# Patient Record
Sex: FEMALE | Race: WHITE | NOT HISPANIC OR LATINO | Employment: FULL TIME
[De-identification: names, ages, dates, MRNs, and addresses within clinical notes are randomized per-mention and may not be internally consistent; named-entity substitution may affect disease eponyms.]

---

## 2017-10-01 ENCOUNTER — HEALTH MAINTENANCE LETTER (OUTPATIENT)
Age: 32
End: 2017-10-01

## 2019-01-29 ENCOUNTER — TELEPHONE (OUTPATIENT)
Dept: ONCOLOGY | Facility: CLINIC | Age: 34
End: 2019-01-29

## 2019-01-29 NOTE — TELEPHONE ENCOUNTER
Attempted to reach out to patient by calling number listed in chart.  Automated message stated that subscriber is not accepting calls at this time.  Unable to leave voice message.  Will attempt to contact patient at later date.

## 2019-02-01 ENCOUNTER — TELEPHONE (OUTPATIENT)
Dept: ONCOLOGY | Facility: CLINIC | Age: 34
End: 2019-02-01

## 2019-02-01 NOTE — TELEPHONE ENCOUNTER
Per IB Task: Please arrange non-urgent new patient consult (can either be at [facility] or [facility]-patient was on Medicine service at [facility], not seen by Gyn or Gyn Onc yet). Patient expecting call.     Ascites, bilateral ovarian masses.  Cytology negative.

## 2020-10-21 ENCOUNTER — CARE COORDINATION (OUTPATIENT)
Dept: CARDIOLOGY | Facility: CLINIC | Age: 35
End: 2020-10-21

## 2020-10-21 NOTE — PROGRESS NOTES
Referral- Heart Failure    REFERRING CLINIC INFORMATION:    Referring Clinic: [facility]   Referring Provider: Patria Iyer MD  Contact Information:     REFERRING PATIENT INFORMATION:    Patient Phone Number:   Home Phone [phone]   Mobile [phone]      Consent to Communicate: None Found  Insurance Obtained: Yes       PATIENT/REFERRAL Hx:       October 21, 2020  Referral faxed, Not sure which provider referred. Attempted to call unable to leave . Patient is referred to Dr. Parikh.Katheryn is reviewing.      Sending to [facility] Nurse to Triage    IMAGING REQUESTED        Echo disks were mailed.     PLAN:       Made appt 12/10 with Dr. Taylor. Echos in epic.     ATTEMPTS TO CONTACT:  1. October 12 , 2020 NA unable to leave .    Suhail Mcgarry CMA  Heart Failure, Advanced Heart Failure & CORE  Referral Specialist &     [facility]  Cardiology  Office: [phone]  [address] [place] [address]

## 2020-10-28 PROBLEM — F33.41 RECURRENT MAJOR DEPRESSIVE DISORDER IN PARTIAL REMISSION (H): Status: ACTIVE | Noted: 2019-07-01

## 2020-10-28 PROBLEM — Z86.2 HISTORY OF ITP: Status: ACTIVE | Noted: 2019-03-01

## 2020-10-28 PROBLEM — F41.1 GAD (GENERALIZED ANXIETY DISORDER): Status: ACTIVE | Noted: 2017-02-27

## 2020-10-28 PROBLEM — R18.8 OTHER ASCITES: Status: ACTIVE | Noted: 2019-01-26

## 2020-10-28 PROBLEM — I50.32 CHRONIC DIASTOLIC CONGESTIVE HEART FAILURE (H): Status: ACTIVE | Noted: 2019-11-18

## 2020-10-28 PROBLEM — Z01.84 LACK OF IMMUNITY TO HEPATITIS B VIRUS DEMONSTRATED BY SEROLOGIC TEST: Status: ACTIVE | Noted: 2020-10-21

## 2020-10-28 PROBLEM — I31.9 PERICARDITIS: Status: ACTIVE | Noted: 2019-03-13

## 2020-10-28 RX ORDER — HYDROXYCHLOROQUINE SULFATE 200 MG/1
200 TABLET, FILM COATED ORAL EVERY MORNING
COMMUNITY
Start: 2015-04-07

## 2020-10-28 RX ORDER — OMEPRAZOLE 40 MG/1
40 CAPSULE, DELAYED RELEASE ORAL DAILY
COMMUNITY
Start: 2020-09-01 | End: 2021-03-02

## 2020-10-28 RX ORDER — SPIRONOLACTONE 25 MG/1
12.5 TABLET ORAL EVERY MORNING
Status: ON HOLD | COMMUNITY
Start: 2020-01-20 | End: 2021-03-23

## 2020-10-28 RX ORDER — ACETAMINOPHEN 325 MG/1
325 TABLET ORAL EVERY 6 HOURS PRN
Status: ON HOLD | COMMUNITY
End: 2021-03-23

## 2020-10-28 RX ORDER — TORSEMIDE 10 MG/1
20 TABLET ORAL EVERY MORNING
Status: ON HOLD | COMMUNITY
Start: 2020-04-07 | End: 2021-03-23

## 2020-10-28 RX ORDER — SULFACETAMIDE SODIUM, SULFUR 100; 50 MG/G; MG/G
LOTION TOPICAL PRN
COMMUNITY
Start: 2020-07-08

## 2020-10-28 RX ORDER — SERTRALINE HYDROCHLORIDE 100 MG/1
100 TABLET, FILM COATED ORAL EVERY MORNING
COMMUNITY
Start: 2020-07-22 | End: 2023-06-08

## 2020-10-28 RX ORDER — DIPHENOXYLATE HYDROCHLORIDE AND ATROPINE SULFATE 2.5; .025 MG/1; MG/1
1 TABLET ORAL DAILY
COMMUNITY
End: 2021-03-02

## 2020-10-28 NOTE — PROGRESS NOTES
"Patient is referred by Dr. Patria Iyer, [facility], to Dr. 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 In early 2019, she had acute pericarditis thought to be secondary to her lupus. She was on prednisone and colchicine for about 5 months. When her inflammatory markers were rechecked, they were normal. Her cardiac imaging thus far shows no evidence for constrictive pericarditis. On her recent echocardiogram, there was no evidence for a pericardial effusion. Her cardiac MRI did not show increased pericardial thickening or ventricular interdependence. She did have an ovarian mass and had that removed in December of 2019. The patient had problems with pleural and pericardial effusions. She also had ascites. When she had her last paracentesis in August of 2019, the fluid looked to be transudative. Her liver tests and kidney function are normal. She continues to have difficulties with recurrent abdominal fluid accumulation. She did not tolerate higher doses of Aldactone. The patient will continue on her current dose of torsemide and Aldactone for now. She is thinking of getting another opinion at the [facility]. I encouraged her to do this. They may want to repeat invasive testing to again assess for the possibility of constrictive pericarditis. The patient does not endorse any further pleuritic chest pain to suggest an recurrent pericarditis. On the echocardiogram, there was no evidence for pericardial effusion at this time. There was some speculation that she could have had Meigs syndrome; however even when her ovarian mass was removed, she still has difficulties with fluid retention. As mentioned above, her paracentesis fluid did not appear exudative, but rather was transudative in nature.\"      10/28/20 - Called and left message for patient to call back if she wished to proceed " making an appt appt with Dr. Parikh.    11/2/20 - Left message for patient to call to schedule appt per referral.  11/4/20 - Patient returned call and will be scheduled for Dec.

## 2020-11-05 ENCOUNTER — CARE COORDINATION (OUTPATIENT)
Dept: CARDIOLOGY | Facility: CLINIC | Age: 35
End: 2020-11-05

## 2020-11-05 NOTE — PROGRESS NOTES
"Patient is referred by Dr. Patria Iyer, On license of [facility], to Dr. 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 In early 2019, she had acute pericarditis thought to be secondary to her lupus. She was on prednisone and colchicine for about 5 months. When her inflammatory markers were rechecked, they were normal. Her cardiac imaging thus far shows no evidence for constrictive pericarditis. On her recent echocardiogram, there was no evidence for a pericardial effusion. Her cardiac MRI did not show increased pericardial thickening or ventricular interdependence. She did have an ovarian mass and had that removed in December of 2019. The patient had problems with pleural and pericardial effusions. She also had ascites. When she had her last paracentesis in August of 2019, the fluid looked to be transudative. Her liver tests and kidney function are normal. She continues to have difficulties with recurrent abdominal fluid accumulation. She did not tolerate higher doses of Aldactone. The patient will continue on her current dose of torsemide and Aldactone for now. She is thinking of getting another opinion at the [facility]. I encouraged her to do this. They may want to repeat invasive testing to again assess for the possibility of constrictive pericarditis. The patient does not endorse any further pleuritic chest pain to suggest an recurrent pericarditis. On the echocardiogram, there was no evidence for pericardial effusion at this time. There was some speculation that she could have had Meigs syndrome; however even when her ovarian mass was removed, she still has difficulties with fluid retention. As mentioned above, her paracentesis fluid did not appear exudative, but rather was transudative in nature.\"     "

## 2020-11-30 DIAGNOSIS — I50.32 CHRONIC DIASTOLIC CONGESTIVE HEART FAILURE (H): Primary | ICD-10-CM

## 2020-12-10 ENCOUNTER — OFFICE VISIT (OUTPATIENT)
Dept: CARDIOLOGY | Facility: CLINIC | Age: 35
End: 2020-12-10
Attending: INTERNAL MEDICINE
Payer: COMMERCIAL

## 2020-12-10 VITALS
WEIGHT: 146 LBS | DIASTOLIC BLOOD PRESSURE: 68 MMHG | HEIGHT: 65 IN | HEART RATE: 79 BPM | OXYGEN SATURATION: 100 % | BODY MASS INDEX: 24.32 KG/M2 | SYSTOLIC BLOOD PRESSURE: 99 MMHG

## 2020-12-10 DIAGNOSIS — I50.32 CHRONIC DIASTOLIC CONGESTIVE HEART FAILURE (H): ICD-10-CM

## 2020-12-10 DIAGNOSIS — I42.5 RESTRICTIVE CARDIOMYOPATHY (H): Primary | ICD-10-CM

## 2020-12-10 LAB
ANION GAP SERPL CALCULATED.3IONS-SCNC: 5 MMOL/L (ref 3–14)
BUN SERPL-MCNC: 11 MG/DL (ref 7–30)
CALCIUM SERPL-MCNC: 8.5 MG/DL (ref 8.5–10.1)
CHLORIDE SERPL-SCNC: 105 MMOL/L (ref 94–109)
CO2 SERPL-SCNC: 29 MMOL/L (ref 20–32)
CREAT SERPL-MCNC: 0.72 MG/DL (ref 0.52–1.04)
GFR SERPL CREATININE-BSD FRML MDRD: >90 ML/MIN/{1.73_M2}
GLUCOSE SERPL-MCNC: 86 MG/DL (ref 70–99)
NT-PROBNP SERPL-MCNC: 326 PG/ML (ref 0–125)
POTASSIUM SERPL-SCNC: 4 MMOL/L (ref 3.4–5.3)
SODIUM SERPL-SCNC: 140 MMOL/L (ref 133–144)

## 2020-12-10 PROCEDURE — 80048 BASIC METABOLIC PNL TOTAL CA: CPT | Performed by: PATHOLOGY

## 2020-12-10 PROCEDURE — 99205 OFFICE O/P NEW HI 60 MIN: CPT | Performed by: INTERNAL MEDICINE

## 2020-12-10 PROCEDURE — G0463 HOSPITAL OUTPT CLINIC VISIT: HCPCS

## 2020-12-10 PROCEDURE — 36415 COLL VENOUS BLD VENIPUNCTURE: CPT | Performed by: PATHOLOGY

## 2020-12-10 PROCEDURE — 83880 ASSAY OF NATRIURETIC PEPTIDE: CPT | Performed by: PATHOLOGY

## 2020-12-10 RX ORDER — LIDOCAINE 40 MG/G
CREAM TOPICAL
Status: CANCELLED | OUTPATIENT
Start: 2020-12-10

## 2020-12-10 RX ORDER — SODIUM CHLORIDE 9 MG/ML
INJECTION, SOLUTION INTRAVENOUS CONTINUOUS
Status: CANCELLED | OUTPATIENT
Start: 2020-12-10

## 2020-12-10 ASSESSMENT — PAIN SCALES - GENERAL: PAINLEVEL: NO PAIN (0)

## 2020-12-10 ASSESSMENT — MIFFLIN-ST. JEOR: SCORE: 1358.13

## 2020-12-10 NOTE — LETTER
"12/10/2020      RE: Sissy Donaldson  [id number]  [place] [address]       Dear Colleague,    Thank you for the opportunity to participate in the care of your patient, Sissy Donaldson, at the [facility] at [facility]. Please see a copy of my visit note below.    Patient is referred by Dr. Patria Iyer, [facility], to Dr. 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 In early 2019, she had acute pericarditis thought to be secondary to her lupus. She was on prednisone and colchicine for about 5 months. When her inflammatory markers were rechecked, they were normal. Her cardiac imaging thus far shows no evidence for constrictive pericarditis. On her recent echocardiogram, there was no evidence for a pericardial effusion. Her cardiac MRI did not show increased pericardial thickening or ventricular interdependence. She did have an ovarian mass and had that removed in December of 2019. The patient had problems with pleural and pericardial effusions. She also had ascites. When she had her last paracentesis in August of 2019, the fluid looked to be transudative. Her liver tests and kidney function are normal. She continues to have difficulties with recurrent abdominal fluid accumulation. She did not tolerate higher doses of Aldactone. The patient will continue on her current dose of torsemide and Aldactone for now. She is thinking of getting another opinion at the [facility]. I encouraged her to do this. They may want to repeat invasive testing to again assess for the possibility of constrictive pericarditis. The patient does not endorse any further pleuritic chest pain to suggest an recurrent pericarditis. On the echocardiogram, there was no evidence for pericardial effusion at this time. There was some " "speculation that she could have had Meigs syndrome; however even when her ovarian mass was removed, she still has difficulties with fluid retention. As mentioned above, her paracentesis fluid did not appear exudative, but rather was transudative in nature.\"        SUBJECTIVE: Follow-up for lupus: pericarditis on cardiac MRI, background history of lupus: Thrombocytopenia (resolved), inflammatory arthritis, positive DELFINA, positive SSA, positive anti-native DNA antibody (transient), positive lupus anticoagulant with no prior history of thrombotic event or pregnancy complication, transient elevation of IgA and IgM anti-beta-2 glycoprotein 1 antibodies, hypocomplementemia C3. She had a flare earlier 2018 when she was off hydroxychloroquine. Her thrombocytopenia has resolved since she was put back on hydroxychloroquine and with a course of prednisone. Recently found ascites (Aa-Sa gradient less than 1.1), ovarian cyst thought to be benign, recurrent ascites with repeated evaluation suggesting Aa-Sa gradient more than 1.1.     History of present illness: This is a 5 month follow-up for the patient. She currently takes hydroxychloroquine 400 mg a day. For her diastolic dysfunction, she currently takes combination of torsemide 20-30 mg a day, and spironolactone 12.5 mg a day. She underwent ovary and cyst removal earlier this year. The pathology was benign.    She has continued to do well. She has no more lower extremity swelling. They abdominal bloating has significantly improved. She reported no unusual fever, skin rashes, PND/orthopnea, oral/nasal ulcer, hair loss, swollen joint, paresthesia or muscle weakness. She is normotensive.    She is due to have an ocular monitoring. She is also interested in having an influenza vaccination today.     Past medical history, family and social history, current medications and adverse reactions were updated in EMR.    Physical exams: /64 (BP Location: Left Arm, BP Cuff Size: " Regular)  Pulse 80  Temp 97.3  F (36.3  C) (Temporal Artery)     General appearance: A young looking middle-aged  female who was not in acute physical distress.  Skin: No rash, tophus, nodules, open-wound ulcers, Raynaud changes, telangiectasia, sclerodermatous and dermatomyositis skin changes, psoriasis, psoriatic nail or anything suggesting vasculitis, erythema nodosum. Normal nailfold capillaries. No extremity edema.  HEENT: No psoriasis on the scalp. No conjunctivitis, scleritis or active uveitis or synaechia. Normal extra ocular movements. No sinus tenderness. No malar rash, discoid rash, oral or nasal mucosal ulcers. No nasal septal perforation.  Respiratory: Normal respiratory effort. Lungs are clear with good breath sounds.  Heart: RR without audible murmurs, rubs, or gallops.  Musculoskeletal exams: All 4 extremities were examined. No significant synovitis or active inflammatory arthritis involving any joints of 4 extremities. Normal gait, normal muscle power and tone.    Assessment and plan:     1. Systemic lupus, doing clinically well.  2. Chronic long-term high-risk medication and monitoring-hydroxychloroquine.    She is doing very well. There is nothing clinically to suggest clinical disease activity of lupus. She just had a blood and urine test this morning. The preliminary CBC is completely normal and her urinalysis is also completely normal.    She is due to have an ocular monitoring for hydroxychloroquine and this will be arranged for her. Since her disease has been doing well, I will cut down the dose of hydroxychloroquine to comply with 5 milligram/kilogram by asking her to take 400 mg during the week days and 200 mg during the weekends. Sunscreen use when outdoor.    Influenza vaccination was recommended and given to her today.    Followup again in 6 months or earlier if she is not doing well.         [facility] First Time consultation Notes        HPI:     Dear Colleagues,     I had the pleasure of seeing Sissy Donaldson in the  [facility] on December 10, 2020.     As you know the patient is a very pleasant 35 year old female with a history of long standing systolic heart failure who presents today for consideration of advanced therapies.     [unfilled] cardiac history is as follows.     Recently, the patient reports slowing down, being less active, endorses PND/orthopnea     [unfilled] has had x  lsb of unintentional weight loss.       PAST MEDICAL HISTORY:  Past Medical History:   Diagnosis Date     SLE (systemic lupus erythematosus related syndrome) (H)         FAMILY HISTORY:      SOCIAL HISTORY:          CURRENT MEDICATIONS:    Current Outpatient Medications   Medication Sig Dispense Refill     acetaminophen (TYLENOL) 325 MG tablet Take 325 mg by mouth every 6 hours as needed       cholecalciferol 50 MCG (2000 UT) tablet Take 2,000 Units by mouth daily       citalopram (CELEXA) 20 MG tablet 10 mg daily  3     hydroxychloroquine (PLAQUENIL) 200 MG tablet 2 tab/d Monday-Friday, 1 tab/d on Saturday and Sunday.       metroNIDAZOLE (METROCREAM) 0.75 % external cream Use on the face BID       Multiple Vitamin (MULTI-VITAMINS) TABS Take 1 tablet by mouth daily       omeprazole (PRILOSEC) 40 MG DR capsule Take 40 mg by mouth daily       sertraline (ZOLOFT) 100 MG tablet Take 100 mg by mouth daily       spironolactone (ALDACTONE) 25 MG tablet Take 12.5 mg by mouth daily       sulfacetamide sodium-sulfur 10-5 % LOTN Wash face daily.       torsemide (DEMADEX) 10 MG tablet Take 20 mg by mouth daily         ROS:   Constitutional: No fever, chills, or sweats. Weight . + fatigue   ENT: No visual disturbance, ear ache, epistaxis, sore throat.   Allergies/Immunologic: Negative.   Respiratory: No cough, hemoptysis.   Cardiovascular: As per HPI.   GI: No nausea, vomiting, hematemesis, melena, or hematochezia.   :  "No urinary frequency, dysuria, or hematuria.   Integument: Negative.   Psychiatric: Negative.   Neuro: Negative.   Endocrinology: Negative.   Musculoskeletal: Negative.      Family history:     Mother side   Cousin RA   Aunt Tess,   Santi aunt lupus         EXAM:    BP 99/68 (BP Location: Right arm, Patient Position: Chair, Cuff Size: Adult Regular)   Pulse 79   Ht 1.651 m (5' 5\")   Wt 66.2 kg (146 lb)   SpO2 100%   BMI 24.30 kg/m      General: appears comfortable, alert and articulate  Head: normocephalic, atraumatic  Eyes: anicteric sclera, EOMI  Neck: no adenopathy  Orophyarynx: moist mucosa, no lesions, dentition intact  Heart: PMI diffuse,trace systolic murmur at LLSB, regular, S1/S2, rub, estimated JVP 9 cm   Lungs: clear, no rales or wheezing  Abdomen: soft, non-tender, bowel sounds present, no hepatosplenomegaly  Extremities: no clubbing, cyanosis or edema  Neurological: normal speech and affect, no gross motor deficits    Labs:      ECHOCARDIOGRAM.  Date: 09/14/2020 Start: 01:05 PM [facility]    CONCLUSIONS  1. Left ventricular chamber size is normal. Normal left ventricular  wall thickness. Global and regional left ventricular function is  normal. Left ventricular ejection fraction is visually estimated at  60%. Normal left ventricular diastolic function.  2. Normal right ventricle size and normal global function.  3. The cardiac valves are normal.  4. No pulmonary hypertension.  5. Dilation of the inferior vena cava (> 2.1 cm) with abnormal  respiratory variation in diameter. Estimated right atrial pressure is  > 15 mmHg.  6. There is no pericardial effusion.  7. Compared to the previous study done on 5/14/20, the central venous  pressure has increased.      FINDINGS    MITRAL VALVE  Normal mitral valve structure and function. Trace (physiologic) mitral  regurgitation.    AORTIC VALVE  The aortic valve is tricuspid. Normal aortic valve structure " and  function.    TRICUSPID VALVE  Normal tricuspid valve structure, but trace tricuspid regurgitation.  Pulmonary artery systolic pressure estimate is 14 mmHg above RAP.    PULMONIC VALVE  Normal pulmonic valve structure and function. Mild (physiologic)  pulmonic regurgitation.    LEFT ATRIUM  Normal left atrium.    LEFT VENTRICLE  Left ventricular chamber size is normal. Normal left ventricular wall  thickness. Global and regional left ventricular function is normal.  Left ventricular ejection fraction is visually estimated at 60%.  Normal left ventricular diastolic function.    RIGHT ATRIUM  Normal right atrium.    RIGHT VENTRICLE  Normal right ventricle size and normal global function.    PERICARDIAL EFFUSION  There is no pericardial effusion.      MISCELLANEOUS  The visualized segments of the thoracic aorta are normal in diameter.  Dilation of the inferior vena cava (> 2.1 cm) with abnormal  respiratory variation in diameter. Estimated right atrial pressure is  > 15 mmHg.    M-MODE/2D MEASUREMENTS & CALCULATIONS   LV Diastolic Dimension: 4.52 cm   LV PW Diastolic: 0.58 cm   Septum Diastolic: 0.52 cm                                              LA Dimension: 3.6 cm                                              Aortic Annulus: 2 cm                                              LA Area: 16.4 cm^2   LV Systolic Dimension: 2.92 cm             LA/Aorta: 1.8   LV Volume Diastolic: 92.3 ml               Ascending Aorta: 2.6 cm   LV Volume Systolic: 24.9 ml                LA volume index: 30.8   LV EDV/LV EDV Index: 92.3 ml/54 m^2        ml/m^2   LV ESV/LV ESV Index: 24.9 ml/15 m^2EF   Estimated: 60 %                            IVC Inspiration: 2.4 cm                                              IVC Expiration: 2.86 cm   LVOT: 2.1 cm    DOPPLER MEASUREMENTS & CALCULATIONS   MV Peak E-Wave: 0.6 m/s   MV Peak A-Wave: 0.3 m/s       Pulm. Vein D Velocity:0.4 m/s   MV E/A Ratio: 2.18            Pulm. Vein A Reversal  Velocity:0.2 m/s   MV Peak Gradient: 1.36 mmHg   Pulm. Vein S Velocity: 0.5 m/s   MV Deceleration Time: 180   msec   E' Velocity: 0.18 m/s                                 TR Velocity:1.8 m/s                                 TR Gradient:13.5 mmHg    PROCEDURE    Doppler Quality: Adequate quality pulse, continuous wave, and color  Doppler was performed and interpreted.  2-D Quality: Adequate quality 2-dimensional echo was performed and  interpreted.    Indications: Chest pain.    Contrast Medium: Not Applicable.  Height: 65 inches Weight: 140 pounds BSA: 1.7 m^2 BMI: 23.3 kg/m^2  Rhythm: SinusBP: 102/64 mmHg     Gender:                            Female  SIGNATURE  ----------------------------------------------------------------------   Electronically signed by MALIK MCKEON MD(Interpreting   Physician) on 09/16/2020 10:58 AM  ----------------------------------------------------------------------  DEMOGRAPHICS   Patient Name   [facility] Number        OUTPT                  JOVAN ELAM   Patient Number [id number]        Date of Study      09/14/2020   Accession      [id number]       Interpreting       MALIK MCKEON,   Zandra                         Physician          MD   Date of Birth  1985      Ordering Physician VAUGHN SERNA MD   Primary        VAUGHN SERNA MD Sonographer        , [facility]   Physician  The procedure was explained in detail to the patient. Risks,  complications and alternative treatments were reviewed. Informed  consent was obtained.   Other Result Information   Daniel, Card Results In - 09/16/2020 10:58 AM CDT   ECHOCARDIOGRAM.  Date: 09/14/2020 Start: 01:05 PM [facility]    CONCLUSIONS  1. Left ventricular chamber size is normal. Normal left ventricular  wall thickness. Global and regional left ventricular function is  normal. Left ventricular ejection fraction is visually estimated at  60%. Normal left ventricular diastolic function.  2. Normal right  ventricle size and normal global function.  3. The cardiac valves are normal.  4. No pulmonary hypertension.  5. Dilation of the inferior vena cava (> 2.1 cm) with abnormal  respiratory variation in diameter. Estimated right atrial pressure is  > 15 mmHg.  6. There is no pericardial effusion.  7. Compared to the previous study done on 5/14/20, the central venous  pressure has increased.      FINDINGS    MITRAL VALVE  Normal mitral valve structure and function. Trace (physiologic) mitral  regurgitation.    AORTIC VALVE  The aortic valve is tricuspid. Normal aortic valve structure and  function.    TRICUSPID VALVE  Normal tricuspid valve structure, but trace tricuspid regurgitation.  Pulmonary artery systolic pressure estimate is 14 mmHg above RAP.    PULMONIC VALVE  Normal pulmonic valve structure and function. Mild (physiologic)  pulmonic regurgitation.    LEFT ATRIUM  Normal left atrium.    LEFT VENTRICLE  Left ventricular chamber size is normal. Normal left ventricular wall  thickness. Global and regional left ventricular function is normal.  Left ventricular ejection fraction is visually estimated at 60%.  Normal left ventricular diastolic function.    RIGHT ATRIUM  Normal right atrium.    RIGHT VENTRICLE  Normal right ventricle size and normal global function.    PERICARDIAL EFFUSION  There is no pericardial effusion.      MISCELLANEOUS  The visualized segments of the thoracic aorta are normal in diameter.  Dilation of the inferior vena cava (> 2.1 cm) with abnormal  respiratory variation in diameter. Estimated right atrial pressure is  > 15 mmHg.    M-MODE/2D MEASUREMENTS & CALCULATIONS   LV Diastolic Dimension: 4.52 cm   LV PW Diastolic: 0.58 cm   Septum Diastolic: 0.52 cm                                              LA Dimension: 3.6 cm                                              Aortic Annulus: 2 cm                                              LA Area: 16.4 cm^2   LV Systolic Dimension: 2.92 cm              LA/Aorta: 1.8   LV Volume Diastolic: 92.3 ml               Ascending Aorta: 2.6 cm   LV Volume Systolic: 24.9 ml                LA volume index: 30.8   LV EDV/LV EDV Index: 92.3 ml/54 m^2        ml/m^2   LV ESV/LV ESV Index: 24.9 ml/15 m^2EF   Estimated: 60 %                            IVC Inspiration: 2.4 cm                                              IVC Expiration: 2.86 cm   LVOT: 2.1 cm    DOPPLER MEASUREMENTS & CALCULATIONS   MV Peak E-Wave: 0.6 m/s   MV Peak A-Wave: 0.3 m/s       Pulm. Vein D Velocity:0.4 m/s   MV E/A Ratio: 2.18            Pulm. Vein A Reversal Velocity:0.2 m/s   MV Peak Gradient: 1.36 mmHg   Pulm. Vein S Velocity: 0.5 m/s   MV Deceleration Time: 180   msec   E' Velocity: 0.18 m/s                                 TR Velocity:1.8 m/s                                 TR Gradient:13.5 mmHg    PROCEDURE    Doppler Quality: Adequate quality pulse, continuous wave, and color  Doppler was performed and interpreted.  2-D Quality: Adequate quality 2-dimensional echo was performed and  interpreted.    Indications: Chest pain.    Contrast Medium: Not Applicable.  Height: 65 inches Weight: 140 pounds BSA: 1.7 m^2 BMI: 23.3 kg/m^2  Rhythm: SinusBP: 102/64 mmHg     Gender:                            Female  SIGNATURE  ----------------------------------------------------------------------   Electronically signed by MALIK MCKEON MD(Interpreting   Physician) on 09/16/2020 10:58 AM  ----------------------------------------------------------------------  DEMOGRAPHICS   Patient Name   TAMIR       Room Number        MIGUEL ELAM   Patient Number [id number]        Date of Study      09/14/2020   Accession      [id number]       Interpreting       MALIK MCKEON,   Zandra                         Physician          MD   Date of Birth  1985      Ordering Physician VAUGHN SERNA MD   Primary        VAUGHN SERNA MD Sonographer        O, [facility]   Physician  The procedure was explained in  detail to the patient. Risks,  complications and alternative treatments were reviewed. Informed  consent was obtained.       Chest CT   CHEST WALL AND LOWER NECK: The visualized thyroid is normal. The chest wall and visualized supraclavicular soft tissues are unremarkable.    AXILLAE: No adenopathy.    MEDIASTINUM AND CHELLE: There is residual thymic tissue in the anterior superior mediastinum. No significant mediastinal or hilar adenopathy. The esophagus is unremarkable.    VESSELS: Unremarkable.    HEART: Unremarkable.    PERICARDIAL EFFUSION: Small pericardial effusion or pericardial thickening is unchanged.     LUNG AND LARGE AIRWAYS: Unremarkable.    PLEURA: No significant pleural effusion.    BONES: Unremarkable.    UPPER ABDOMEN: Parenchymal calcification or nonobstructing stone in the right mid kidney is unchanged. The visualized upper abdominal structures are otherwise unremarkable.    IMPRESSION:   1. Small pericardial effusion or pericardial thickening is unchanged.  2. The lungs are clear.        Has ascites     IMPRESSION: Small volume ascites, possibly loculated in the right upper quadrant. The largest ascites pocket is seen in the right lower quadrant.      Right and left heart cath       Pressures:  Baseline  Pressures:  - HR: 85  Pressures:  - Rhythm:  Pressures:  -- Left Ventricle (s/edp): 98/27/--  Pressures:  -- Pulmonary Artery (S/D/M): 35/21/27  Pressures:  -- Pulmonary Capillary Wedge: --/25/22  Pressures:  -- Right Atrium (a/v/M): --/33/24  Pressures:  -- Right Ventricle (s/edp): 35/22/--    O2 Sats:  Baseline  O2 Sats:  - HR: 85  O2 Sats:  - Rhythm:  O2 Sats:  -- AO: 11.8/98/15.73  O2 Sats:  -- IVC: 11.6/72.2/11.39  O2 Sats:  -- IVC: 12.1/71.9/11.83  O2 Sats:  -- PA: 12.4/70.2/11.84  O2 Sats:  -- PCW: 11.6/99/15.62  O2 Sats:  -- RA: 12.6/72.1/12.36  O2 Sats:  -- RV: 12.1/70.5/11.6  O2 Sats:  -- SVC: 11.8/62.2/9.98    Outputs:  Baseline  Outputs:  -- CALCULATIONS: [age]  Outputs:  -- CALCULATIONS: Body Surface Area: 1.65  Outputs:  -- CALCULATIONS: Height in cm: 165.00  Outputs:  -- CALCULATIONS: Sex: Female  Outputs:  -- CALCULATIONS: Weight in k.00  Outputs:  -- OUTPUTS: Blood Oxygen Difference: 4.34  Outputs:  -- OUTPUTS: CO by Madelin: 3.98  Outputs:  -- OUTPUTS: Madelin cardiac index: 2.42  Outputs:  -- OUTPUTS: Madelin HR: 70.00  Outputs:  -- OUTPUTS: O2 consumption: 172.65  Outputs:  -- OUTPUTS: Vo2 Indexed: 104.84  Outputs:  -- RESISTANCES: Pulmonary vascular index (dsc):  144.17  Outputs:  -- RESISTANCES: Pulmonary vascular index (Wood  Units): 1.80  Outputs:  -- RESISTANCES: Pulmonary vascular resistance  (dsc): 87.55  Outputs:  -- RESISTANCES: Pulmonary vascular resistance  (Wood Units): 1.09  Outputs:  -- RESISTANCES: Right ventricular stroke work:  2.62  Outputs:  -- RESISTANCES: Right ventricular stroke work  index: 1.59  Outputs:  -- RESISTANCES: Total pulmonary index (dsc):  778.54  Outputs:  -- RESISTANCES: Total pulmonary index (Wood  Units): 9.73  Outputs:  -- RESISTANCES: Total pulmonary resistance (dsc):  472.75  Outputs:  -- RESISTANCES: Total pulmonary resistance (Wood  Units): 5.91  Outputs:  -- SHUNTS: Effective pulmonary flow: 4.08  Outputs:  -- SHUNTS: Effective pulmonary flow indexed: 2.48  Outputs:  -- SHUNTS: Left right Flow indexed: 0.29  Outputs:  -- SHUNTS: Left to Right flow: 0.48  Outputs:  -- SHUNTS: Pulmonary flow: 4.57  Outputs:  -- SHUNTS: Pulmonary flow/Systemic flow ratio:  1.15  Outputs:  -- SHUNTS: Qp Indexed: 2.77  Outputs:  -- SHUNTS: Qs Indexed: 2.42  Outputs:  -- SHUNTS: Right left flow indexed: -0.06  Outputs:  -- SHUNTS: Right to left flow: -0.10  Outputs:  -- SHUNTS: Systemic flow: 3.98   Other Result Information   Daniel, Card Results In - 2019 12:53 PM CST [facility]  [address] [place] [address]  [phone]    JOVAN GARNETT   [id number]    Cardiovascular Catheterization  Comprehensive Report    : 1985  Age: 33 years  Gender: Female  Study date: 2019  Test time: 10:30 - 11:15  Fluoro time: 5.26 min    Diagnostic Cardiologist:  JENNY CORNELIUS MD  Circulator:  Tesha Bailey RN  Scrub:  Abiel Metcalf  X-ray Tech:  Anne-Marie Lordah RT  Monitor:  Donavan Alvarez  Ordering Physician:  EJNNY CORNELIUS MD  Circulator:  Mala Gonzalez RN  Scrub:  Lacey Elkins CVT  X-ray Tech:  Jean-Paul Easley RT  Monitor:  Abiel Palacios    Procedures  PROCEDURES PERFORMED:    --  Right heart catheterization.  --  Ultrasound.  --  Left Heart Cath.  NARRATIVE: The risks and alternatives of the procedures and  conscious sedation were explained to the patient and  informed consent was obtained. The patient was brought to  the cath lab and placed on the table. The planned puncture  sites were prepped and draped in the usual sterile fashion.    --  Right radial artery access.    --  Right brachial vein access.    --  Right heart catheterization. A catheter was advanced to  the pulmonary artery. Measurements of cardiac output were  performed.    --  Ultrasound.    --  Left Heart Cath.    PROCEDURE COMPLETION    TIMING: Test started at 10:30. Test concluded at 11:15.  RADIATION EXPOSURE: Fluoroscopy time: 5.26 min.    CONTRAST GIVEN: 0 ml Isovue 300.    MEDICATIONS GIVEN: Fentanyl, 50 mcg, IV, at 10:51. Fentanyl,  25 mcg, IV, at 10:55. Fentanyl, 25 mcg, IV, at 10:57.  Versed (1 mg), 1 mg, IV, at 10:51. Versed (1 mg), 0.5 mg,  IV, at 10:55. Versed (1 mg), 0.5 mg, IV, at 10:57. NTG  (INTRA-ARTERIAL), 100 mcg, at 11:00. Heparin Bolus, 3,000  units, IV, at 11:03. Normal Saline Bolus .9, 200 ml, IV, at  11:07.    Study conclusions  IMPRESSIONS: Severely elevated RA pressures.  Normal RV and PA pressures.  Moderately elevated PCWP and LVEDP, likely secondary to  diastolic dysfunction.  Simultaneous LV-RV pressures did not demonstrate any  evidence of pericardial constriction.    Conclusions: Elevated left  sided filling pressures secondary  to diastolic dysfunction. Recommend low salt diet and Lasix  20 mg Q daily until follow up appointment.    Prepared and signed by    JENNY CORNELIUS MD  Signed 2019 12:53:14    HEMODYNAMIC TABLES    Pressures:  Baseline  Pressures:  - HR: 85  Pressures:  - Rhythm:  Pressures:  -- Left Ventricle (s/edp): 98/27/--  Pressures:  -- Pulmonary Artery (S/D/M): 35//27  Pressures:  -- Pulmonary Capillary Wedge: --//  Pressures:  -- Right Atrium (a/v/M): --//  Pressures:  -- Right Ventricle (s/edp): 35/22/--    O2 Sats:  Baseline  O2 Sats:  - HR: 85  O2 Sats:  - Rhythm:  O2 Sats:  -- AO: 11.8/98/15.73  O2 Sats:  -- IVC: 11.6/72.2/11.39  O2 Sats:  -- IVC: 12.1/71.9/11.83  O2 Sats:  -- PA: 12.4/70.2/11.84  O2 Sats:  -- PCW: 11.6/99/15.62  O2 Sats:  -- RA: 12.6/72.1/12.36  O2 Sats:  -- RV: 12.1/70.5/11.6  O2 Sats:  -- SVC: 11.8/62.2/9.98    Outputs:  Baseline  Outputs:  -- CALCULATIONS[age]  Outputs:  -- CALCULATIONS: Body Surface Area: 1.65  Outputs:  -- CALCULATIONS: Height in cm: 165.00  Outputs:  -- CALCULATIONS: Sex: Female  Outputs:  -- CALCULATIONS: Weight in k.00  Outputs:  -- OUTPUTS: Blood Oxygen Difference: 4.34  Outputs:  -- OUTPUTS: CO by Madelin: 3.98  Outputs:  -- OUTPUTS: Madelin cardiac index: 2.42  Outputs:  -- OUTPUTS: Madelin HR: 70.00  Outputs:  -- OUTPUTS: O2 consumption: 172.65  Outputs:  -- OUTPUTS: Vo2 Indexed: 104.84  Outputs:  -- RESISTANCES: Pulmonary vascular index (dsc):  144.17  Outputs:  -- RESISTANCES: Pulmonary vascular index (Wood  Units): 1.80  Outputs:  -- RESISTANCES: Pulmonary vascular resistance  (dsc): 87.55  Outputs:  -- RESISTANCES: Pulmonary vascular resistance  (Wood Units): 1.09  Outputs:  -- RESISTANCES: Right ventricular stroke work:  2.62  Outputs:  -- RESISTANCES: Right ventricular stroke work  index: 1.59  Outputs:  -- RESISTANCES: Total pulmonary index (dsc):  778.54  Outputs:  -- RESISTANCES: Total pulmonary index  (Wood  Units): 9.73  Outputs:  -- RESISTANCES: Total pulmonary resistance (dsc):  472.75  Outputs:  -- RESISTANCES: Total pulmonary resistance (Wood  Units): 5.91  Outputs:  -- SHUNTS: Effective pulmonary flow: 4.08  Outputs:  -- SHUNTS: Effective pulmonary flow indexed: 2.48  Outputs:  -- SHUNTS: Left right Flow indexed: 0.29  Outputs:  -- SHUNTS: Left to Right flow: 0.48  Outputs:  -- SHUNTS: Pulmonary flow: 4.57  Outputs:  -- SHUNTS: Pulmonary flow/Systemic flow ratio:  1.15  Outputs:  -- SHUNTS: Qp Indexed: 2.77  Outputs:  -- SHUNTS: Qs Indexed: 2.42  Outputs:  -- SHUNTS: Right left flow indexed: -0.06  Outputs:  -- SHUNTS: Right to left flow: -0.10  Outputs:  -- SHUNTS: Systemic flow: 3.98       MRI   1)   Normal left ventricular chamber size  with calculated left ventricular ejection fraction 58%.   2)   Normal right ventricular chamber size and systolic function.   3)   Pericardial thickness appears to be normal. Trivial pericardial effusion. During free breathing, there is no evidence of ventricular interdependence.    4)   Patient was given gadolinium and delayed hyperenhancement study was performed.  There was no myocardial hyperenhancement noted following gadolinium infusion, there by negating the presence of previous myocardial infarction, fibrosis, infiltrative cardiomyopathy and/or scarring.   5)   With gadolinium, there is hyperenhancement of the pericardium suggestive of acute inflammatory pericarditis.          inflammatory markers negative           RF negative   HIV neg    DS DNA: equivocal     nt-bnp: 145   Very low level positiuve         Assessment and Plan:           Service Date: 12/10/2020      [facility]    [address] [place] [address]      MD Stacy P[facility]    [address] [place] [id number]      RE: Sissy Donaldson    MRN: [id number]   : 1985       Dear Colleagues:       I had the pleasure of seeing Sissy Donaldson in the [facility] for a first-time consultation on 12/10/2020.  As you know, she is a very pleasant, 35-year-old woman with a long-standing history of lupus who has had recurrent pericardial effusions and pericarditis, who is referred for a second opinion about whether or not she may have constrictive pericarditis.      Her lupus history begins about 12 years ago when she had arthritis in several joints as well as ITP.  She was placed on several months of steroids and eventually was on hydroxychloroquine with good control.  Five years ago, she had her first pericardial effusion, which was treated with anti-inflammatories.  She has had a recurrence of pericardial effusion in the last 2 years and also had onset of dyspnea on exertion.  She had abdominal bloating and lower extremity edema as well as generalized fatigue.  She has had significant evaluation for this, including right and left heart catheterization as well as a cardiac MRI.  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  This was removed over a year ago without improvement in her abdominal edema.  She also had several different paracenteses that had demonstrated transudative fluid.  Of note, her filling pressures on her right heart catheterization were significantly elevated.  On her cardiac MRI, she had no fibrosis or evidence of infiltrative disease.      She recently saw her rheumatologist, who felt as though her lupus was under control.  While during this last episode of pericarditis she had evidence of lupus activity by serology, this appears to be well controlled.  Presently, she is off prednisone and just on hydroxychloroquine.  She has no other symptoms of lupus presently.      She denies overt PND or orthopnea, although she does sleep on a few pillows, as it is more comfortable.  She has not had  heart failure admissions.  She can walk a block or 2, but has really stopped all exercise due to dyspnea on exertion.      PAST MEDICAL HISTORY:   1.  Lupus as detailed above.   2.  Pericardial effusions with recurrence and evidence of pericarditis.   3.  History of anxiety.   4.  GERD.      FAMILY HISTORY:  Notable for several family members with autoimmune disease on mother's side.  There is a cousin with rheumatoid arthritis, an aunt with psoriatic arthritis and a great aunt with lupus, again all on the mother's side.  Her siblings do not have any autoimmune disease.  No one else has a history of cardiac disease in the family.      SOCIAL HISTORY:  The patient is  and has a son who is around 12 that she shares half custody with.  She does not smoke, but does occasionally drink social alcohol.      CURRENT MEDICATIONS:   1.  Celexa 10 mg daily.     2.  Hydroxychloroquine 200, 2 tabs a day, so 400 Monday-Friday and 1 tab on Saturday-Sunday.   3.  Spironolactone 12.5 daily.   4.  Torsemide 20 daily.      REVIEW OF SYSTEMS:     CONSTITUTIONAL:  No fevers, chills or sweats.  Weight fluctuates with weight gain, but she has not had unintentional weight loss.   ENT:  No visual disturbance, epistaxis or sore throat.   ALLERGIES:  Negative.   RESPIRATORY:  No cough or hemoptysis.   CARDIOVASCULAR:  As per HPI.   GASTROINTESTINAL:  No nausea, vomiting, hematemesis, melena or hematochezia.   GENITOURINARY:  No urinary frequency, dysuria or hematuria.   PSYCHIATRIC:  Does feel anxious when she cannot breathe.   NEUROLOGIC:  Negative.   ENDOCRINE:  Negative.   MUSCULOSKELETAL:  Negative.  No recent arthritis flares.      PHYSICAL EXAMINATION:   VITAL SIGNS:  Blood pressure 99/68, pulse 79, weight 146 pounds, BMI 24.   GENERAL:  The patient appears well and appears her stated age.  Breathing is comfortable at rest.   HEENT:  Moist mucous membranes.  No scleral icterus.   NECK:  No thyromegaly or masses.     CARDIAC:   Loud S2.  Neck veins are to the angle of the mandible with significant respiratory variation.  Trace systolic murmur at the left lower sternal border.  No rub.   LUNGS:  Clear to auscultation bilaterally.   ABDOMEN:  Distended.  Fluid wave is palpable.  No hepatosplenomegaly.   EXTREMITIES:  Trace lower extremity edema.  Normal cap refill.   NEUROLOGIC:  Normal speech and affect.  No gross deficits.  Normal gait.      Data reviewed included an echocardiogram from 09/14/2020:  Normal LV size, normal LV function, ejection fraction 60%, normal RV size and function.  Cardiac valves are normal.  IVC is dilated.  No pericardial effusion.  Atrial sizes are also normal.  LV end-diastolic dimension of 4.3 cm.  Right heart catheterization from 2019 shows a right atrial pressure of 24, RV 35/22, wedge pressure of 22, pulmonary artery pressure 35/27, mean of 27, Madelin cardiac index 2.4.  Coronary angiogram was not performed.  Cardiac MRI demonstrated normal LV size, ejection fraction 50%.  Normal RV size and function.  Pericardial thickness appears to be normal.  Trivial pericardial effusion.  No intraventricular interdependence.  With gadolinium, there was hyperenhancement of the pericardium suggestive of acute inflammatory pericarditis.  There was no evidence of infiltrative cardiomyopathy.  HIV is negative.  Rheumatoid factor is negative.  Double-stranded DNA is equivocal.  NT-proBNP is 145 and has been very low as well in the past.      ASSESSMENT AND RECOMMENDATIONS:  In summary, this is a very pleasant, 35-year-old woman with a long-standing history of lupus, which appears to have pericardial involvement.  The time course of the effusions as well as her symptom onset is very suggestive of constriction.  It is reassuring that her NT-proBNP is very low and that on MRI her myocardium appears normal, and her atria are normal in size.  This is all very suggestive that the process is pericardial and not myocardial.  She does not  appear to have an effusion that is drainable at this point, and she also does not have elevated inflammatory markers, so I do not think that we will be able to manage this with medication alone.  On exam today, her neck veins are at the angle of the mandible.  If we leave her like this, she will develop pulmonary hypertension as well as cirrhosis.  She is also extremely limited for her age based on her elevated filling pressures.  I am very much thinking that she will need a partial or complete pericardial stripping.  We are obtaining a repeat right and left heart catheterization with coronary angiogram.  Given her lupus, I want to ensure her coronaries are clean before any cardiac surgery is performed.      I will also repeat a more recent CT noncontrast to look at the pericardium itself for surgical planning.      I am having her see my colleague Dr. Kirit Jang for consultation, and we will make a plan after that.  For now, I will keep her on her 20 mg of torsemide as well as spironolactone.      I do not think that she needs any other immune suppression at this time by her last Rheumatology note and absence of ongoing inflammation or chest pain.      Please feel free to call me if you have any further questions.       Sincerely,      Kamran Skinner MD   Cardiology Staff         KAMRAN SKINNER MD             D: 12/10/2020   T: 2020   MT: [email or web address]      Name:     JOVAN GARNETT   MRN:      [id number]        Account:      [id number]   :      1985           Service Date: 12/10/2020      Document: [id number]        Please do not hesitate to contact me if you have any questions/concerns.     Sincerely,     Kamran Skinner MD

## 2020-12-10 NOTE — NURSING NOTE
Chief Complaint   Patient presents with     New Patient     New Heart Failure     Vitals were taken and medications reconciled.     Chilo Dorman CMA  2:03 PM

## 2020-12-10 NOTE — PATIENT INSTRUCTIONS
Cardiology Providers you saw during your visit:  Dr. Parikh    Medication changes:  1  No changes    Follow up:  1.  Right and Left heart cath with possible Angiogram  2.  Chest CT without contrast  3.  Appointment with Dr Tapia, with Cardiovascular Surgery    You are scheduled for a Coronary Angiogram at the [facility], [address] [place] [address], [place] [address].   Please arrive to the [facility] Waiting Room on __________  at ___________.       You will need to undergo a COVID-19 PCR swab test within 4 days of procedure. You will receive a phone call with more information. If you do not hear from the COVID scheduling team, please call: [phone] to schedule.      When you arrive you will be escorted back to the pre procedure area called 2A. Here they will insert an IV, draw labs, and obtain a short medical history. Please bring an updated list of your current medications.    A provider will come and talk with you about the procedure and obtain consent.    A nurse from the Cardiac Catheterization Lab will then escort you to the procedure area. You will be receiving sedation during the procedure so you will need someone to drive you to and from the hospital.    After the procedure you will recover for a short period (2 - 6 hours). You will be discharged with instructions for post procedure care. However, depending on what the angiogram shows you may have to have stents placed and this might require an overnight stay. We ask that you bring a small bag of necessities for your comfort if you would need to stay overnight. DO NOT BRING ANY VALUABLES!      Pre procedure instructions:  It is recommended that you undergo a COVID-19 PCR swab test 48-72 hours before procedure. You will receive a phone call with more information.   1. Make arrangements to have a  as you will not be allowed to drive following the procedure. Someone should stay with you the night after your  procedure.  2.  Do not eat any solid food or milk products for 8 hours prior to the exam. You may drink clear liquids until 2 hours prior to the exam. Clear liquids include the following: water, Jell-O, clear broth, apple juice or any non-carbonated drink that you can see through (no pop!).   3. Do not drink alcohol or smoke 24 hours prior to test.  5. You may take your other morning medications as prescribed with a sip of water. If you currently take an aspirin, continue taking your same dose. If not, take a 325 mg aspirin the day before and the day of your procedure.   If you take Plavix, continue your Plavix.       Post Procedure Instructions:  For 24 hours:    Have an adult stay with you for 24 hours.    Relax and take it easy.    Drink plenty of fluids.    You may eat your normal diet, unless your doctor tells you otherwise.    Do NOT make any important or legal decisions.    Do NOT drive or operate machines at home or at work.    Do NOT drink alcohol.    Do NOT smoke.     Medicines:    If you have begun Plavix (clopidogrel), Effient (prasugrel), or Brilinta (ticagrelor), do not stop taking it until you talk to your heart doctor (cardiologist).     If you are on metformin (Glucophage), do not restart it until you have blood tests (within 2 to 3 days after discharge). When your doctor tells you it is safe, you may restart the metformin.    If you have stopped any other medicines, check with your nurse or provider about when to restart them.     Care of wrist or arm site:    It is normal to have soreness at the puncture site and mild tingling in your hand for up to 3 days.    Remove the Band-Aid after 24 hours. If there is minor oozing, apply another Band-aid and remove it after 12 hours.    Do NOT take a bath, or use a hot tub or pool for the next 48 hours. You may shower.    It is normal to have a small bruise. There should not be a lump at the site.    Do not scrub the site.    Do not use lotion or powder near  the puncture site for 3 days.     Activity Restrictions    For 2 days, do not use your hand or arm to support your weight (such as rising from a chair) or bend your wrist  (such as lifting a garage door).    For 2 days, do not lift more than 5 pounds or exercise your arm (tennis, golf or bowling).       If you start bleeding from the site in your arm: Sit down and press firmly on the site with your fingers for 10 minutes.  Call your doctor as soon as you can.  Call 911 right away if you have bleeding that is heavy or does not stop.  Call your doctor if:    You have a large or growing hard lump around the site.    The site is red, swollen, hot or tender.    Labs:  Results for JOVAN GARNETT (MRN [id number]) as of 12/10/2020 14:17   Ref. Range 12/10/2020 13:40   Sodium Latest Ref Range: 133 - 144 mmol/L 140   Potassium Latest Ref Range: 3.4 - 5.3 mmol/L 4.0   Chloride Latest Ref Range: 94 - 109 mmol/L 105   Carbon Dioxide Latest Ref Range: 20 - 32 mmol/L 29   Urea Nitrogen Latest Ref Range: 7 - 30 mg/dL 11   Creatinine Latest Ref Range: 0.52 - 1.04 mg/dL 0.72   GFR Estimate Latest Ref Range: >60 mL/min/1.73_m2 >90   GFR Estimate If Black Latest Ref Range: >60 mL/min/1.73_m2 >90   Calcium Latest Ref Range: 8.5 - 10.1 mg/dL 8.5   Anion Gap Latest Ref Range: 3 - 14 mmol/L 5   N-Terminal Pro Bnp Latest Ref Range: 0 - 125 pg/mL 326 (H)   Glucose Latest Ref Range: 70 - 99 mg/dL 86       Please call if you have :  1. Weight gain of more than 2 pounds in a day or 5 pounds in a week  2. Increased shortness of breath, swelling or bloating  3. Dizziness, lightheadedness   4. Any questions or concerns.       Follow the American Heart Association Diet and Lifestyle recommendations:  Limit saturated fat, trans fat, sodium, red meat, sweets and sugar-sweetened beverages. If you choose to eat red meat, compare labels and select the leanest cuts available.  Aim for at least 150 minutes of moderate physical activity or 75 minutes  "of vigorous physical activity - or an equal combination of both - each week.      During business hours: [phone], press option # 1 to be routed to the [facility] then option # 4 for \"To send a message to your care team\"      After hours, weekends or holidays: On Call Cardiologist- [phone]   option #4 and ask to speak to the on-call Cardiologist. Inform them you are a CORE/heart failure patient at the [facility].      Heart Failure Support Group  [facility]  The Board Room, 8th Floor  [address] [address]     Meetings   1-2 pm  January 6  March 2  May 4  July 6  September 14  November 2      Alexus Vivar RN BSN CHFN  Cardiology Care Coordinator - Heart Failure/ [facility] Clinic  [facility]   Questions and [phone]   First press #1 for the [place] and then press #4 for \"To send a message to your care team\"    "

## 2020-12-10 NOTE — NURSING NOTE
Diet: Patient instructed regarding a heart failure healthy diet, including discussion of reduced fat and 2000 mg daily sodium restriction, daily weights, medication purpose and compliance, fluid restrictions and resources for patient and family to access for assistance with heart failure management.       Labs: Patient was given results of the laboratory testing obtained today and patient was instructed about when to return for the next laboratory testing.     Med Reconcile: Reviewed and verified all current medications with the patient. The updated medication list was printed and given to the patient.     Med changes: no changes    Return Appointment: Patient given instructions regarding scheduling next clinic visit: needs RHC, LHC, Angio with Dr Medley and CT chest with out contrast and then a follow up with Dr Jang    Patient stated she understood all health information given and agreed to call with further questions or concerns.     Reports some sort of reaction to either contrast or pain medication after RHC/Angio in Feb 2019- but could not find charted reaction.      Alexus Vivar RN

## 2020-12-11 ENCOUNTER — TELEPHONE (OUTPATIENT)
Dept: CARDIOLOGY | Facility: CLINIC | Age: 35
End: 2020-12-11

## 2020-12-11 NOTE — PROGRESS NOTES
Service Date: 12/10/2020      Tawatchai Paisansinsup, MD [facility]    [address] [place] [address]      Elizabeth Bisinov, MD [facility]    [facility] [place] [address]      RE: Sissy Donaldson    MRN: [id number]   : 1985      Dear Colleagues:       I had the pleasure of seeing Sissy Donaldson in the [facility] for a first-time consultation on 12/10/2020.  As you know, she is a very pleasant, 35-year-old woman with a long-standing history of lupus who has had recurrent pericardial effusions and pericarditis, who is referred for a second opinion about whether or not she may have constrictive pericarditis.      Her lupus history begins about 12 years ago when she had arthritis in several joints as well as ITP.  She was placed on several months of steroids and eventually was on hydroxychloroquine with good control.  Five years ago, she had her first pericardial effusion, which was treated with anti-inflammatories.  She has had a recurrence of pericardial effusion in the last 2 years and also had onset of dyspnea on exertion.  She had abdominal bloating and lower extremity edema as well as generalized fatigue.  She has had significant evaluation for this, including right and left heart catheterization as well as a cardiac MRI.  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  This was removed over a year ago without improvement in her abdominal edema.  She also had several different paracenteses that had demonstrated transudative fluid.  Of note, her filling pressures on her right heart catheterization were significantly elevated.  On her cardiac MRI, she had no fibrosis or evidence of infiltrative disease.      She recently saw her rheumatologist, who felt as though her lupus was under control.  While during this last episode of  pericarditis she had evidence of lupus activity by serology, this appears to be well controlled.  Presently, she is off prednisone and just on hydroxychloroquine.  She has no other symptoms of lupus presently.      She denies overt PND or orthopnea, although she does sleep on a few pillows, as it is more comfortable.  She has not had heart failure admissions.  She can walk a block or 2, but has really stopped all exercise due to dyspnea on exertion.      PAST MEDICAL HISTORY:   1.  Lupus as detailed above.   2.  Pericardial effusions with recurrence and evidence of pericarditis.   3.  History of anxiety.   4.  GERD.      FAMILY HISTORY:  Notable for several family members with autoimmune disease on mother's side.  There is a cousin with rheumatoid arthritis, an aunt with psoriatic arthritis and a great aunt with lupus, again all on the mother's side.  Her siblings do not have any autoimmune disease.  No one else has a history of cardiac disease in the family.      SOCIAL HISTORY:  The patient is  and has a son who is around 12 that she shares half custody with.  She does not smoke, but does occasionally drink social alcohol.      CURRENT MEDICATIONS:   1.  Celexa 10 mg daily.     2.  Hydroxychloroquine 200, 2 tabs a day, so 400 Monday-Friday and 1 tab on Saturday-Sunday.   3.  Spironolactone 12.5 daily.   4.  Torsemide 20 daily.      REVIEW OF SYSTEMS:     CONSTITUTIONAL:  No fevers, chills or sweats.  Weight fluctuates with weight gain, but she has not had unintentional weight loss.   ENT:  No visual disturbance, epistaxis or sore throat.   ALLERGIES:  Negative.   RESPIRATORY:  No cough or hemoptysis.   CARDIOVASCULAR:  As per HPI.   GASTROINTESTINAL:  No nausea, vomiting, hematemesis, melena or hematochezia.   GENITOURINARY:  No urinary frequency, dysuria or hematuria.   PSYCHIATRIC:  Does feel anxious when she cannot breathe.   NEUROLOGIC:  Negative.   ENDOCRINE:  Negative.   MUSCULOSKELETAL:  Negative.   No recent arthritis flares.      PHYSICAL EXAMINATION:   VITAL SIGNS:  Blood pressure 99/68, pulse 79, weight 146 pounds, BMI 24.   GENERAL:  The patient appears well and appears her stated age.  Breathing is comfortable at rest.   HEENT:  Moist mucous membranes.  No scleral icterus.   NECK:  No thyromegaly or masses.     CARDIAC:  Loud S2.  Neck veins are to the angle of the mandible with significant respiratory variation.  Trace systolic murmur at the left lower sternal border.  No rub.   LUNGS:  Clear to auscultation bilaterally.   ABDOMEN:  Distended.  Fluid wave is palpable.  No hepatosplenomegaly.   EXTREMITIES:  Trace lower extremity edema.  Normal cap refill.   NEUROLOGIC:  Normal speech and affect.  No gross deficits.  Normal gait.      Data reviewed included an echocardiogram from 09/14/2020:  Normal LV size, normal LV function, ejection fraction 60%, normal RV size and function.  Cardiac valves are normal.  IVC is dilated.  No pericardial effusion.  Atrial sizes are also normal.  LV end-diastolic dimension of 4.3 cm.  Right heart catheterization from 2019 shows a right atrial pressure of 24, RV 35/22, wedge pressure of 22, pulmonary artery pressure 35/27, mean of 27, Madelin cardiac index 2.4.  Coronary angiogram was not performed.  Cardiac MRI demonstrated normal LV size, ejection fraction 50%.  Normal RV size and function.  Pericardial thickness appears to be normal.  Trivial pericardial effusion.  No intraventricular interdependence.  With gadolinium, there was hyperenhancement of the pericardium suggestive of acute inflammatory pericarditis.  There was no evidence of infiltrative cardiomyopathy.  HIV is negative.  Rheumatoid factor is negative.  Double-stranded DNA is equivocal.  NT-proBNP is 145 and has been very low as well in the past.      ASSESSMENT AND RECOMMENDATIONS:  In summary, this is a very pleasant, 35-year-old woman with a long-standing history of lupus, which appears to have pericardial  involvement.  The time course of the effusions as well as her symptom onset is very suggestive of constriction.  It is reassuring that her NT-proBNP is very low and that on MRI her myocardium appears normal, and her atria are normal in size.  This is all very suggestive that the process is pericardial and not myocardial.  She does not appear to have an effusion that is drainable at this point, and she also does not have elevated inflammatory markers, so I do not think that we will be able to manage this with medication alone.  On exam today, her neck veins are at the angle of the mandible.  If we leave her like this, she will develop pulmonary hypertension as well as cirrhosis.  She is also extremely limited for her age based on her elevated filling pressures.  I am very much thinking that she will need a partial or complete pericardial stripping.  We are obtaining a repeat right and left heart catheterization with coronary angiogram.  Given her lupus, I want to ensure her coronaries are clean before any cardiac surgery is performed.      I will also repeat a more recent CT noncontrast to look at the pericardium itself for surgical planning.      I am having her see my colleague Dr. Kirit Jang for consultation, and we will make a plan after that.  For now, I will keep her on her 20 mg of torsemide as well as spironolactone.      I do not think that she needs any other immune suppression at this time by her last Rheumatology note and absence of ongoing inflammation or chest pain.      Please feel free to call me if you have any further questions.       Sincerely,      Kamran Skinner MD   Cardiology Staff         KAMRAN SKINNER MD             D: 12/10/2020   T: 2020   MT: neymar      Name:     JOVAN GARNETT   MRN:      [id number]        Account:      [id number]   :      1985           Service Date: 12/10/2020      Document: [id number]

## 2020-12-11 NOTE — TELEPHONE ENCOUNTER
[facility] Call Center    Phone Message    May a detailed message be left on voicemail: yes     Reason for Call: Other: Pt would like a call back to discuss the scheduling of the left heart cath. She needs to know so she can ask off work.     Action Taken: Message routed to:  [facility] cardio    Travel Screening: Not Applicable

## 2020-12-14 ENCOUNTER — TELEPHONE (OUTPATIENT)
Dept: CARDIOLOGY | Facility: CLINIC | Age: 35
End: 2020-12-14

## 2020-12-14 NOTE — TELEPHONE ENCOUNTER
[facility] Call Center    Phone Message    May a detailed message be left on voicemail: yes     Reason for Call: Other: Pt would like a call back to discuss the medication as she forgot to mention that the providing Dr stated that if her weight increased she can increase it to 30 mg as pt did this over the weekend and pt is wondering if this is ok . Please reach to pt to discuss     Action Taken: Message routed to:  [facility] Cardio    Travel Screening: Not Applicable

## 2020-12-14 NOTE — TELEPHONE ENCOUNTER
Checked with [facility] - they do not have Dr Medley' schedule yet.      Updated Sissy, will continue to follow up on this.      She reports that she feels like she has some fluid volume right now.  Weight is up from 142-146 (unclear when this happened), she notes some SOB/need to catch her breath, reports some abdominal swelling and occasional pinches in her chest.  These are random and only last a minute or two.      She is taking her diuretics as ordered torsemide 20 mg daily, patti 12.5 daily

## 2020-12-14 NOTE — TELEPHONE ENCOUNTER
Sissy called to clarify that she had taken an extra 10 mg of torsemide over the weekend, it did not help.  In the past she had to take 40 mg for 2-3 days for relief.

## 2020-12-15 NOTE — TELEPHONE ENCOUNTER
Date: 12/15/2020    Time of Call: 4:16 PM     Diagnosis:  Heart failure     [ VORB ] Ordering provider: Dr Parikh    Order: increase Torsemide 40 mg daily for 3 days, call back on Friday if no relief     Order received by: Alexus Vivar RN       Follow-up/additional notes: Sissy stated understanding

## 2020-12-18 ENCOUNTER — PATIENT OUTREACH (OUTPATIENT)
Dept: CARDIOLOGY | Facility: CLINIC | Age: 35
End: 2020-12-18

## 2020-12-18 DIAGNOSIS — Z11.59 ENCOUNTER FOR SCREENING FOR OTHER VIRAL DISEASES: ICD-10-CM

## 2020-12-18 NOTE — TELEPHONE ENCOUNTER
Updated Sissy with date for testing.  Sent message to Carli GUTIERRES RN regarding Dr Jang appointment.

## 2020-12-18 NOTE — TELEPHONE ENCOUNTER
[facility] Call Center    Phone Message    May a detailed message be left on voicemail: yes     Reason for Call: Other: Pt wondering on the status of setting up the heart cath and the consult with . Please call back to discuss these.     Action Taken: Message routed to:  [facility] cardio    Travel Screening: Not Applicable

## 2020-12-21 NOTE — TELEPHONE ENCOUNTER
She is aware of time and place of cath lab procedures and is aware of the Dr Jang appointment.  Also needs a Chest CT-scheduled for 1/12

## 2021-01-05 ENCOUNTER — TELEPHONE (OUTPATIENT)
Dept: CARDIOLOGY | Facility: CLINIC | Age: 36
End: 2021-01-05

## 2021-01-05 NOTE — TELEPHONE ENCOUNTER
Action    Action Taken 1-5: Requested from PN:    Cardiac cath        CTA Chest       2-6-19    3-8-19    1-26-19    10-24-19   1-14: confirmed MRI and CT Chest  1-14: sent second request to PN  1-18: resolved CTA

## 2021-01-05 NOTE — TELEPHONE ENCOUNTER
Spoke with Sissy and the business office.  Apparently there is a missing referral form from [employer] so Sissy's insurance will be charging out of network prices to cover the testing.      The business office will follow up with the referral team to see if it can be cleared up.

## 2021-01-05 NOTE — TELEPHONE ENCOUNTER
[facility] Call [facility]    Phone Message    May a detailed message be left on voicemail: yes     Reason for Call: Other: Pt would like a clal back as the business office called letting pt know she is out of network for procedure and needs a referral from the Dr and pt would like a call back to discuss     Action Taken: Message routed to:  [facility] Cardio    Travel Screening: Not Applicable

## 2021-01-08 NOTE — TELEPHONE ENCOUNTER
Checked in with Sissy to see if she had been informed of decision yet- left message.  Left message with Masha in business office to follow up.

## 2021-01-09 DIAGNOSIS — Z11.59 ENCOUNTER FOR SCREENING FOR OTHER VIRAL DISEASES: ICD-10-CM

## 2021-01-09 LAB
SARS-COV-2 RNA RESP QL NAA+PROBE: NORMAL
SPECIMEN SOURCE: NORMAL

## 2021-01-09 PROCEDURE — U0005 INFEC AGEN DETEC AMPLI PROBE: HCPCS | Performed by: INTERNAL MEDICINE

## 2021-01-09 PROCEDURE — U0003 INFECTIOUS AGENT DETECTION BY NUCLEIC ACID (DNA OR RNA); SEVERE ACUTE RESPIRATORY SYNDROME CORONAVIRUS 2 (SARS-COV-2) (CORONAVIRUS DISEASE [COVID-19]), AMPLIFIED PROBE TECHNIQUE, MAKING USE OF HIGH THROUGHPUT TECHNOLOGIES AS DESCRIBED BY CMS-2020-01-R: HCPCS | Performed by: INTERNAL MEDICINE

## 2021-01-10 LAB
LABORATORY COMMENT REPORT: NORMAL
SARS-COV-2 RNA RESP QL NAA+PROBE: NEGATIVE
SPECIMEN SOURCE: NORMAL

## 2021-01-11 ENCOUNTER — TELEPHONE (OUTPATIENT)
Dept: CARDIOLOGY | Facility: CLINIC | Age: 36
End: 2021-01-11

## 2021-01-11 NOTE — TELEPHONE ENCOUNTER
Checked in with Sissy, she is going to proceed with testing tomorrow but has not heard back from her insurance or the business office.

## 2021-01-11 NOTE — TELEPHONE ENCOUNTER
Left voicemail for patient to complete Travel Screen for Cardiac Cath Lab appointment on 1/12 and inform patient of updated Visitor Policy.       covid neg

## 2021-01-12 ENCOUNTER — APPOINTMENT (OUTPATIENT)
Dept: LAB | Facility: CLINIC | Age: 36
End: 2021-01-12
Attending: INTERNAL MEDICINE
Payer: COMMERCIAL

## 2021-01-12 ENCOUNTER — APPOINTMENT (OUTPATIENT)
Dept: MEDSURG UNIT | Facility: CLINIC | Age: 36
End: 2021-01-12
Attending: INTERNAL MEDICINE
Payer: COMMERCIAL

## 2021-01-12 ENCOUNTER — HOSPITAL ENCOUNTER (OUTPATIENT)
Dept: CT IMAGING | Facility: CLINIC | Age: 36
End: 2021-01-12
Attending: INTERNAL MEDICINE
Payer: COMMERCIAL

## 2021-01-12 ENCOUNTER — HOSPITAL ENCOUNTER (OUTPATIENT)
Facility: CLINIC | Age: 36
Discharge: HOME | End: 2021-01-12
Attending: INTERNAL MEDICINE | Admitting: INTERNAL MEDICINE
Payer: COMMERCIAL

## 2021-01-12 VITALS
SYSTOLIC BLOOD PRESSURE: 110 MMHG | DIASTOLIC BLOOD PRESSURE: 76 MMHG | HEIGHT: 65 IN | OXYGEN SATURATION: 99 % | RESPIRATION RATE: 16 BRPM | HEART RATE: 70 BPM | TEMPERATURE: 98.6 F | BODY MASS INDEX: 24.35 KG/M2 | WEIGHT: 146.16 LBS

## 2021-01-12 DIAGNOSIS — I42.5 RESTRICTIVE CARDIOMYOPATHY (H): ICD-10-CM

## 2021-01-12 PROBLEM — Z98.890 STATUS POST CORONARY ANGIOGRAM: Status: ACTIVE | Noted: 2021-01-12

## 2021-01-12 LAB
ANION GAP SERPL CALCULATED.3IONS-SCNC: 6 MMOL/L (ref 3–14)
APTT PPP: 35 SEC (ref 22–37)
B-HCG SERPL-ACNC: <1 IU/L (ref 0–5)
BUN SERPL-MCNC: 10 MG/DL (ref 7–30)
CALCIUM SERPL-MCNC: 8.8 MG/DL (ref 8.5–10.1)
CHLORIDE SERPL-SCNC: 106 MMOL/L (ref 94–109)
CO2 SERPL-SCNC: 29 MMOL/L (ref 20–32)
CREAT SERPL-MCNC: 0.83 MG/DL (ref 0.52–1.04)
ERYTHROCYTE [DISTWIDTH] IN BLOOD BY AUTOMATED COUNT: 12.2 % (ref 10–15)
GFR SERPL CREATININE-BSD FRML MDRD: >90 ML/MIN/{1.73_M2}
GLUCOSE SERPL-MCNC: 87 MG/DL (ref 70–99)
HCT VFR BLD AUTO: 39.4 % (ref 35–47)
HGB BLD-MCNC: 12.6 G/DL (ref 11.7–15.7)
INR PPP: 1.26 (ref 0.86–1.14)
MCH RBC QN AUTO: 30.4 PG (ref 26.5–33)
MCHC RBC AUTO-ENTMCNC: 32 G/DL (ref 31.5–36.5)
MCV RBC AUTO: 95 FL (ref 78–100)
PLATELET # BLD AUTO: 170 10E9/L (ref 150–450)
POTASSIUM SERPL-SCNC: 3.8 MMOL/L (ref 3.4–5.3)
RBC # BLD AUTO: 4.14 10E12/L (ref 3.8–5.2)
SODIUM SERPL-SCNC: 140 MMOL/L (ref 133–144)
WBC # BLD AUTO: 3.8 10E9/L (ref 4–11)

## 2021-01-12 PROCEDURE — 93005 ELECTROCARDIOGRAM TRACING: CPT

## 2021-01-12 PROCEDURE — 250N000011 HC RX IP 250 OP 636: Performed by: INTERNAL MEDICINE

## 2021-01-12 PROCEDURE — 999N000142 HC STATISTIC PROCEDURE PREP ONLY

## 2021-01-12 PROCEDURE — 99153 MOD SED SAME PHYS/QHP EA: CPT | Performed by: INTERNAL MEDICINE

## 2021-01-12 PROCEDURE — 272N000001 HC OR GENERAL SUPPLY STERILE: Performed by: INTERNAL MEDICINE

## 2021-01-12 PROCEDURE — 93010 ELECTROCARDIOGRAM REPORT: CPT | Performed by: INTERNAL MEDICINE

## 2021-01-12 PROCEDURE — 93460 R&L HRT ART/VENTRICLE ANGIO: CPT | Performed by: INTERNAL MEDICINE

## 2021-01-12 PROCEDURE — 85730 THROMBOPLASTIN TIME PARTIAL: CPT | Performed by: INTERNAL MEDICINE

## 2021-01-12 PROCEDURE — 71250 CT THORAX DX C-: CPT | Mod: 26 | Performed by: RADIOLOGY

## 2021-01-12 PROCEDURE — 85610 PROTHROMBIN TIME: CPT | Performed by: INTERNAL MEDICINE

## 2021-01-12 PROCEDURE — 250N000009 HC RX 250: Performed by: INTERNAL MEDICINE

## 2021-01-12 PROCEDURE — 999N000128 HC STATISTIC PERIPHERAL IV START W/O US GUIDANCE

## 2021-01-12 PROCEDURE — 71250 CT THORAX DX C-: CPT

## 2021-01-12 PROCEDURE — 250N000013 HC RX MED GY IP 250 OP 250 PS 637: Performed by: INTERNAL MEDICINE

## 2021-01-12 PROCEDURE — 99152 MOD SED SAME PHYS/QHP 5/>YRS: CPT | Performed by: INTERNAL MEDICINE

## 2021-01-12 PROCEDURE — 84702 CHORIONIC GONADOTROPIN TEST: CPT | Performed by: INTERNAL MEDICINE

## 2021-01-12 PROCEDURE — 999N000054 HC STATISTIC EKG NON-CHARGEABLE

## 2021-01-12 PROCEDURE — 36415 COLL VENOUS BLD VENIPUNCTURE: CPT | Performed by: INTERNAL MEDICINE

## 2021-01-12 PROCEDURE — 85027 COMPLETE CBC AUTOMATED: CPT | Performed by: INTERNAL MEDICINE

## 2021-01-12 PROCEDURE — C1894 INTRO/SHEATH, NON-LASER: HCPCS | Performed by: INTERNAL MEDICINE

## 2021-01-12 PROCEDURE — 258N000003 HC RX IP 258 OP 636: Performed by: INTERNAL MEDICINE

## 2021-01-12 PROCEDURE — 250N000013 HC RX MED GY IP 250 OP 250 PS 637: Performed by: NURSE PRACTITIONER

## 2021-01-12 PROCEDURE — 80048 BASIC METABOLIC PNL TOTAL CA: CPT | Performed by: INTERNAL MEDICINE

## 2021-01-12 RX ORDER — EPTIFIBATIDE 2 MG/ML
180 INJECTION, SOLUTION INTRAVENOUS EVERY 10 MIN PRN
Status: DISCONTINUED | OUTPATIENT
Start: 2021-01-12 | End: 2021-01-12 | Stop reason: HOSPADM

## 2021-01-12 RX ORDER — ARGATROBAN 1 MG/ML
150 INJECTION, SOLUTION INTRAVENOUS
Status: DISCONTINUED | OUTPATIENT
Start: 2021-01-12 | End: 2021-01-12 | Stop reason: HOSPADM

## 2021-01-12 RX ORDER — LIDOCAINE 40 MG/G
CREAM TOPICAL
Status: DISCONTINUED | OUTPATIENT
Start: 2021-01-12 | End: 2021-01-12 | Stop reason: HOSPADM

## 2021-01-12 RX ORDER — SODIUM CHLORIDE 9 MG/ML
INJECTION, SOLUTION INTRAVENOUS CONTINUOUS
Status: DISCONTINUED | OUTPATIENT
Start: 2021-01-12 | End: 2021-01-12 | Stop reason: HOSPADM

## 2021-01-12 RX ORDER — DOPAMINE HYDROCHLORIDE 160 MG/100ML
2-20 INJECTION, SOLUTION INTRAVENOUS CONTINUOUS PRN
Status: DISCONTINUED | OUTPATIENT
Start: 2021-01-12 | End: 2021-01-12 | Stop reason: HOSPADM

## 2021-01-12 RX ORDER — NALOXONE HYDROCHLORIDE 0.4 MG/ML
0.4 INJECTION, SOLUTION INTRAMUSCULAR; INTRAVENOUS; SUBCUTANEOUS
Status: DISCONTINUED | OUTPATIENT
Start: 2021-01-12 | End: 2021-01-12 | Stop reason: HOSPADM

## 2021-01-12 RX ORDER — NALOXONE HYDROCHLORIDE 0.4 MG/ML
0.2 INJECTION, SOLUTION INTRAMUSCULAR; INTRAVENOUS; SUBCUTANEOUS
Status: DISCONTINUED | OUTPATIENT
Start: 2021-01-12 | End: 2021-01-12 | Stop reason: HOSPADM

## 2021-01-12 RX ORDER — HEPARIN SODIUM 10000 [USP'U]/100ML
100-1000 INJECTION, SOLUTION INTRAVENOUS CONTINUOUS PRN
Status: DISCONTINUED | OUTPATIENT
Start: 2021-01-12 | End: 2021-01-12 | Stop reason: HOSPADM

## 2021-01-12 RX ORDER — FLUMAZENIL 0.1 MG/ML
0.2 INJECTION, SOLUTION INTRAVENOUS
Status: DISCONTINUED | OUTPATIENT
Start: 2021-01-12 | End: 2021-01-12 | Stop reason: HOSPADM

## 2021-01-12 RX ORDER — ACETAMINOPHEN 325 MG/1
975 TABLET ORAL ONCE
Status: COMPLETED | OUTPATIENT
Start: 2021-01-12 | End: 2021-01-12

## 2021-01-12 RX ORDER — DOBUTAMINE HYDROCHLORIDE 200 MG/100ML
2-20 INJECTION INTRAVENOUS CONTINUOUS PRN
Status: DISCONTINUED | OUTPATIENT
Start: 2021-01-12 | End: 2021-01-12 | Stop reason: HOSPADM

## 2021-01-12 RX ORDER — ARGATROBAN 1 MG/ML
350 INJECTION, SOLUTION INTRAVENOUS
Status: DISCONTINUED | OUTPATIENT
Start: 2021-01-12 | End: 2021-01-12 | Stop reason: HOSPADM

## 2021-01-12 RX ORDER — ONDANSETRON 2 MG/ML
INJECTION INTRAMUSCULAR; INTRAVENOUS
Status: DISCONTINUED | OUTPATIENT
Start: 2021-01-12 | End: 2021-01-12 | Stop reason: HOSPADM

## 2021-01-12 RX ORDER — ATROPINE SULFATE 0.1 MG/ML
0.5 INJECTION INTRAVENOUS
Status: DISCONTINUED | OUTPATIENT
Start: 2021-01-12 | End: 2021-01-12 | Stop reason: HOSPADM

## 2021-01-12 RX ORDER — HEPARIN SODIUM 1000 [USP'U]/ML
INJECTION, SOLUTION INTRAVENOUS; SUBCUTANEOUS
Status: DISCONTINUED | OUTPATIENT
Start: 2021-01-12 | End: 2021-01-12 | Stop reason: HOSPADM

## 2021-01-12 RX ADMIN — ACETAMINOPHEN 975 MG: 325 TABLET, FILM COATED ORAL at 16:44

## 2021-01-12 RX ADMIN — ASPIRIN 325 MG: 325 TABLET, COATED ORAL at 11:04

## 2021-01-12 RX ADMIN — SODIUM CHLORIDE: 9 INJECTION, SOLUTION INTRAVENOUS at 11:07

## 2021-01-12 ASSESSMENT — MIFFLIN-ST. JEOR: SCORE: 1358.88

## 2021-01-12 NOTE — PRE-PROCEDURE
GENERAL PRE-PROCEDURE:   Procedure:  Coronary angiogram, right and left heart catheterization (evaluate for constriction)  Date/Time:  1/12/2021 11:26 AM    Verbal consent obtained?: Yes    Written consent obtained?: Yes    Risks and benefits: Risks, benefits and alternatives were discussed    Consent given by:  Patient  Patient states understanding of procedure being performed: Yes    Patient's understanding of procedure matches consent: Yes    Procedure consent matches procedure scheduled: Yes    Expected level of sedation:  Moderate  Appropriately NPO:  Yes  ASA Class:  Class 2- mild systemic disease, no acute problems, no functional limitations  Mallampati  :  Grade 2- soft palate, base of uvula, tonsillar pillars, and portion of posterior pharyngeal wall visible  Lungs:  Lungs clear with good breath sounds bilaterally  Heart:  Normal heart sounds and rate  History & Physical reviewed:  History and physical reviewed and no updates needed  Statement of review:  I have reviewed the lab findings, diagnostic data, medications, and the plan for sedation

## 2021-01-12 NOTE — H&P
[facility]      History and Physicial  Sissy Donaldson MRN: [id number]  1985  Date of Admission:1/12/2021  Primary care provider: No primary care provider on file.         History of Present Illness:   Sissy Donaldson is a 35 year old with long standing lupus and recurrent pericarditis with effusions, now with heart failure symptoms concerning for constrictive pericarditis who presents for right and left heart catheterization to further evaluate constriction, also undergoing pre-op coronary angiogram for possible pericardiectomy. Patient reports abdominal distention/swelling as well as dyspnea. No other cardiac symptoms at this time.          Review of Systems:    10 point review of systems negative except for stated above in HPI.          Past Medical History:   Medical History reviewed.   Past Medical History:   Diagnosis Date     SLE (systemic lupus erythematosus related syndrome) (H)             Past Surgical History:   Surgical History reviewed.   Past Surgical History:   Procedure Laterality Date     wisdom teeth               Social History:   Social History reviewed.  Social History     Tobacco Use     Smoking status: Never Smoker     Smokeless tobacco: Never Used   Substance Use Topics     Alcohol use: Yes     Alcohol/week: 0.0 standard drinks     Comment: rarely              Family History:   Family History reviewed.   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   Current Facility-Administered Medications   Medication     aspirin (ASA) EC tablet 325 mg     lidocaine (LMX4) cream     lidocaine 1 % 0.1-1 mL      Patient is NOT allergic to contrast dye OR was given pre-procedure oral steroids for treatment     sodium chloride (PF) 0.9% PF flush 3 mL     sodium chloride (PF) 0.9% PF flush 3 mL     sodium chloride 0.9% infusion             Physical Exam:   Vitals were reviewed.  Blood pressure 102/70, pulse 88, temperature 98.6  F (37  C), temperature source Oral, SpO2 99 %, not currently breastfeeding.    General: AAOx3, NAD  Skin: Not jaundiced, no rash, no ecchymoses  HEENT: MMM, PERRLA, EOM intact  CV: RRR, normal S1S2, no murmur, clicks, rubs  Resp: Clear to auscultation bilaterally, no wheezes, rhonchi  Abd: Soft, non-tender, BS+, no masses appreciated  Extremities: warm and well perfused, palpable pulses, no edema  Neuro: No lateralizing symptoms or focal neurologic deficits        Labs:   Routine Labs:  No results found for: TROPI, TROPONIN, TROPR, TROPN  CMP  Recent Labs   Lab 01/12/21  0912      POTASSIUM 3.8   CHLORIDE 106   CO2 29   ANIONGAP 6   GLC 87   BUN 10   CR 0.83   GFRESTIMATED >90   GFRESTBLACK >90   BARTOLO 8.8     CBC  Recent Labs   Lab 01/12/21  0912   WBC 3.8*   RBC 4.14   HGB 12.6   HCT 39.4   MCV 95   MCH 30.4   MCHC 32.0   RDW 12.2        INR  Recent Labs   Lab 01/12/21  0912   INR 1.26*         Diagnostics:      ECHO 9/2020:  CONCLUSIONS  1. Left ventricular chamber size is normal. Normal left ventricular  wall thickness. Global and regional left ventricular function is  normal. Left ventricular ejection fraction is visually estimated at  60%. Normal left ventricular diastolic function.  2. Normal right ventricle size and normal global function.  3. The cardiac valves are normal.  4. No pulmonary hypertension.  5. Dilation of the inferior vena cava (> 2.1 cm) with abnormal  respiratory variation in diameter. Estimated right atrial pressure is  > 15 mmHg.  6. There is no pericardial effusion.  7. Compared to the previous study done on 5/14/20, the central venous  pressure has  increased.    Assessment and Plan:     Sissy Donaldson is a 35 year old with long standing lupus and recurrent pericarditis with effusions, now with heart failure symptoms concerning for constrictive pericarditis who presents for right and left heart catheterization to further evaluate constriction, also undergoing pre-op coronary angiogram for possible pericardiectomy. Patient reports abdominal distention/swelling as well as dyspnea. No other cardiac symptoms at this time.  We reviewed risks and benefits of the procedure and patient wishes to proceed. Patient reports nausea/vomiting last time she had fent/versed combo. She has tolerated versed and dilaudid in the past.     MCKENNA Sommer, NP-C  [facility] Cardiology Team

## 2021-01-12 NOTE — PROGRESS NOTES
Prep complete for Angiogram. Patients parents will be transporting her home post procedure cell# [phone].

## 2021-01-12 NOTE — PROGRESS NOTES
D/I/A: Pt roomed on 3C in [facility] [address].  Arrived via litter and accompanied by cath lab RN off monitor.  VSSA.  Rhythm upon arrival sinus rhythm on monitor.  Denies pain or sob.  Reviewed activity restrictions and when to notify RN, ie-changes to breathing or increased chest pressure or chest pain.  CCL access:  Right TR band.  P: Continue to monitor status.  Discharge to home once meeting criteria.

## 2021-01-12 NOTE — DISCHARGE INSTRUCTIONS
Summary and Plan:    1. Your right and left heart cath did not show evidence of constriction; however, your CT scan does show thickened pericardium and a small effusion.  2. I will have Dr. Parikh and her team reach out to you with further recommendations.      Going Home after an Angioplasty or Stent Placement (Cardiac)  ______________________________________________      After you go home:    Have an adult stay with you for 24 hours.    Drink plenty of fluids.    You may eat your normal diet, unless your doctor tells you otherwise.    For 24 hours:    Relax and take it easy.    Do NOT smoke.    Do NOT make any important or legal decisions.    Do NOT drive or operate machines at home or at work.    Do NOT drink alcohol.    Remove the Band-Aid after 24 hours. If there is minor oozing, apply another Band-aid and remove it after 12 hours.    For 2 days, do NOT have sex or do any heavy exercise.    Do NOT take a bath, or use a hot tub or pool for at least 3 days. You may shower.    Care of groin site  It is normal to have a small bruise or lump at the site.    Do not scrub the site.    For the first 2 days: Do not stoop or squat. When you cough, sneeze or move your bowels, hold your hand over the puncture site and press gently.    Do not lift more than 10 pounds for at least 3 to 5 days.    Do not use lotion or powder near the puncture site for 3 days.    If you start bleeding from the site in your groin, lie down flat and press firmly  on the site. Call your doctor as soon as you can.    Care of wrist or arm site  It is normal to have soreness at the puncture site and mild tingling in your hand for up to 3 days.    For 2 days, do not use your hand or arm to support your weight (such as rising from a chair) or bend your wrist (such as lifting a garage door).    For 2 days, do not lift more than 5 pounds or exercise your arm (tennis, golf or bowling).    If you start bleeding from the site in your arm:    Sit down and  press firmly on the site with your fingers for 10 minutes. Call your doctor as soon as you can.    If the bleeding stops, sit still and keep your wrist straight for 2 hours.    Medicines    If you have started taking Plavix or Effient, do not stop taking it until you talk to your heart doctor (cardiologist).    If you are on metformin (Glucophage), do not restart it until you have blood tests (within 2 to 3 days after discharge). When your doctor tells you it is safe, you may restart the metformin.    If you have stopped any other medicines, check with your nurse or provider about when to restart them.    Call 911 right away if you have bleeding that is heavy or does not stop.    Call your doctor if:    You have a large or growing hard lump around the site.    The site is red, swollen, hot or tender.    Blood or fluid is draining from the site.    You have chills or a fever greater than 101 F (38 C).    Your leg or arm feels numb or cool.    You have hives, a rash or unusual itching.      [facility] [place]:  [phone] (7 days a week)

## 2021-01-13 LAB — INTERPRETATION ECG - MUSE: NORMAL

## 2021-01-13 NOTE — PROGRESS NOTES
D/I/A:  Patient is tolerating liquids and foods, ambulating, urinating, puncture sites are stable ( no bleeding and no hematoma) and patient has a .  A+O x4 and making needs known.  CCL access sites C/D/I; no bleeding or hematoma; CMS intact.  VSSA.  SR on monitor.  IV access removed.  Education completed and outlined in AVS or handout: medications reviewed with patient.  Questions answered prior to discharge.  Belongings returned to patient at discharge.    P: Discharged to self care.  Patient to follow up with appts as per discharge instruction.

## 2021-01-14 ENCOUNTER — PATIENT OUTREACH (OUTPATIENT)
Dept: CARDIOLOGY | Facility: CLINIC | Age: 36
End: 2021-01-14

## 2021-01-14 NOTE — TELEPHONE ENCOUNTER
Sissy was wondering if she should follow up with the surgery consult- confirmed by Dr Parikh that she should continue with the plan.  Updated Sissy, she will attend her appointment on Tuesday

## 2021-01-18 ENCOUNTER — PRE VISIT (OUTPATIENT)
Dept: CARDIOLOGY | Facility: CLINIC | Age: 36
End: 2021-01-18

## 2021-01-19 ENCOUNTER — OFFICE VISIT (OUTPATIENT)
Dept: CARDIOLOGY | Facility: CLINIC | Age: 36
End: 2021-01-19
Attending: SURGERY
Payer: COMMERCIAL

## 2021-01-19 ENCOUNTER — MYC MEDICAL ADVICE (OUTPATIENT)
Dept: CARDIOLOGY | Facility: CLINIC | Age: 36
End: 2021-01-19

## 2021-01-19 VITALS
HEIGHT: 65 IN | WEIGHT: 143 LBS | BODY MASS INDEX: 23.82 KG/M2 | DIASTOLIC BLOOD PRESSURE: 77 MMHG | SYSTOLIC BLOOD PRESSURE: 120 MMHG

## 2021-01-19 DIAGNOSIS — I31.1 CHRONIC CONSTRICTIVE IDIOPATHIC PERICARDITIS: Primary | ICD-10-CM

## 2021-01-19 PROCEDURE — 99207 PR NON-BILLABLE SERV PER CHARTING: CPT | Performed by: SURGERY

## 2021-01-19 PROCEDURE — G0463 HOSPITAL OUTPT CLINIC VISIT: HCPCS

## 2021-01-19 ASSESSMENT — PAIN SCALES - GENERAL: PAINLEVEL: NO PAIN (0)

## 2021-01-19 ASSESSMENT — MIFFLIN-ST. JEOR: SCORE: 1344.52

## 2021-01-19 NOTE — LETTER
1/19/2021      RE: Sissy Andersonczmarek  [id number]  [place] [address]       Dear Colleague,    Thank you for the opportunity to participate in the care of your patient, Sissy Donaldson, at the [facility]. Please see a copy of my visit note below.      History of Present Illness:                     She is a very pleasant, 35-year-old woman with a long-standing history of lupus who has  had recurrent pericardial effusions and pericarditis, who is referred for a second opinion  about whether or not she may have constrictive pericarditis.       Her lupus history begins about 12 years ago when she had arthritis in several joints as well  as ITP.  She was placed on several months of steroids and eventually was on  hydroxychloroquine with good control.  Five years ago, she had her first pericardial effusion,  which was treated with anti-inflammatories.  She has had a recurrence of pericardial effusion  in the last 2 years and also had onset of dyspnea on exertion.  She had abdominal bloating  and lower extremity edema as well as generalized fatigue.  She has had significant evaluation  for this, including right and left heart catheterization as well as a cardiac MRI.  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  This was removed  over a year ago without improvement in her abdominal edema.  She also had several  different paracenteses that had demonstrated transudative fluid.  Of note, her filling  pressures on her right heart catheterization were significantly elevated.  On her cardiac MRI,  she had no fibrosis or evidence of infiltrative disease.       She recently saw her rheumatologist, who felt as though her lupus was under control.  While  during this last episode of pericarditis she had evidence of lupus activity by serology, this  appears to be well controlled.  Presently, she is  off prednisone and just on  hydroxychloroquine.  She has no other symptoms of lupus presently.       She denies overt PND or orthopnea, although she does sleep on a few pillows, as it is more  comfortable.  She has not had heart failure admissions.  She can walk a block or 2, but has  really  stopped all exercise due to dyspnea on exertion.           Review of Systems:    10 point review of systems negative except for stated above in HPI.           Past Medical History:   Medical History reviewed.   Past Medical History        Past Medical History:   Diagnosis Date     SLE (systemic lupus erythematosus related syndrome) (H)                   Past Surgical History:   Surgical History reviewed.   Past Surgical History         Past Surgical History:   Procedure Laterality Date     wisdom teeth                     Social History:   Social History reviewed.  Social History            Tobacco Use     Smoking status: Never Smoker     Smokeless tobacco: Never Used   Substance Use Topics     Alcohol use: Yes       Alcohol/week: 0.0 standard drinks       Comment: rarely               Family History:   Family History reviewed.   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       Current Facility-Administered Medications   Medication     aspirin (ASA) EC tablet 325 mg     lidocaine (LMX4) cream     lidocaine 1 % 0.1-1 mL     Patient is NOT allergic to contrast dye OR was given pre-procedure oral steroids for treatment     sodium chloride (PF) 0.9% PF flush 3 mL     sodium chloride (PF) 0.9% PF flush 3 mL     sodium  chloride 0.9% infusion              Physical Exam:   Vitals were reviewed.  Blood pressure 102/70, pulse 88, temperature 98.6  F (37  C), temperature source Oral, SpO2 99 %, not currently breastfeeding.     General: AAOx3, NAD  Skin: Not jaundiced, no rash, no ecchymoses  HEENT: MMM, PERRLA, EOM intact  CV: RRR, normal S1S2, no murmur, clicks, rubs  Resp: Clear to auscultation bilaterally, no wheezes, rhonchi  Abd: Soft, non-tender, BS+, no masses appreciated  Extremities: warm and well perfused, palpable pulses, no edema  Neuro: No lateralizing symptoms or focal neurologic deficits         Labs:   Routine Labs:  No results found for: TROPI, TROPONIN, TROPR, TROPN  CMP      Recent Labs   Lab 01/12/21 0912      POTASSIUM 3.8   CHLORIDE 106   CO2 29   ANIONGAP 6   GLC 87   BUN 10   CR 0.83   GFRESTIMATED >90   GFRESTBLACK >90   BARTOLO 8.8      CBC      Recent Labs   Lab 01/12/21 0912   WBC 3.8*   RBC 4.14   HGB 12.6   HCT 39.4   MCV 95   MCH 30.4   MCHC 32.0   RDW 12.2         INR      Recent Labs   Lab 01/12/21 0912   INR 1.26*          Diagnostics:       ECHO 9/2020:  CONCLUSIONS  1. Left ventricular chamber size is normal. Normal left ventricular  wall thickness. Global and regional left ventricular function is  normal. Left ventricular ejection fraction is visually estimated at  60%. Normal left ventricular diastolic function.  2. Normal right ventricle size and normal global function.  3. The cardiac valves are normal.  4. No pulmonary hypertension.  5. Dilation of the inferior vena cava (> 2.1 cm) with abnormal  respiratory variation in diameter. Estimated right atrial pressure is  > 15 mmHg.  6. There is no pericardial effusion.  7. Compared to the previous study done on 5/14/20, the central venous  pressure has increased.      EXAMINATION: CT CHEST W/O CONTRAST, 1/12/2021 8:54 AM         FINDINGS:      The central tracheobronchial tree is patent. Heart size is normal. No   pleural effusion or  pneumothorax. Trace pericardial effusion. Diffuse   pericardial thickening, most prominent over the right ventricle   measuring 6 mm in maximal thickness. No focal airspace consolidation.   No axillary or mediastinal adenopathy. Normal arch anatomy. Thyroid   lobes are normal. Small volume perihepatic ascites. Splenomegaly. No   Suspicious soft tissue or osseous lesions.                                                                       IMPRESSION:    1.  Pericardial thickening and trace pericardial effusion raise the   question of pericarditis. The atria are normal in size, making   restrictive cardiomyopathy less likely.  Consider CMR.   2. Small volume perihepatic ascites and splenomegaly         Conclusion      Right sided filling pressures are moderately elevated.    Normal PA pressures.    Left sided filling pressures are moderately elevated.    Left ventricular filling pressures are mildly elevated.    No evidence of constrictive physiology.    Normal coronary angiogram       Assessment and Plan:     Sissy Donaldson is a 35 year old with long standing lupus and recurrent pericarditis with effusions, now with heart failure symptoms concerning for constrictive pericarditis.  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 I will repeat an echo, I did discuss the risks and benefits of surgery for constrictive pericarditis with a complete pericardiectomy including the risks of death, bleeding, stroke, infection, renal failure and arrhythmias. She understands and is willing to proceed with this.        Please do not hesitate to contact me if you have any questions/concerns.     Sincerely,     Kirit Jang MD

## 2021-01-19 NOTE — NURSING NOTE
Patient seen today for consultation for constrictive pericarditis and discussion of pericardectomy.     Surgery procedure explained to patient  and all questions and concerns were answered and addressed.     Risks and benefits of surgery were discussed with patient (and all present) including risk of death, stroke, bleeding, cardiac ischemia, wound infection, renal failure, arrhythmias and possible pacemaker implantation. Patient accepts these risks and is willing to proceed with surgery.     Dr Jang to discuss patient's CT, and heart cath with Salomón Parikh and Carolina and call patient with recommendations.      Patient verbalized understanding of all instructions and will call with any questions or concerns.

## 2021-01-26 ENCOUNTER — PREP FOR PROCEDURE (OUTPATIENT)
Dept: CARDIOLOGY | Facility: CLINIC | Age: 36
End: 2021-01-26

## 2021-01-26 ENCOUNTER — CARE COORDINATION (OUTPATIENT)
Dept: CARDIOLOGY | Facility: CLINIC | Age: 36
End: 2021-01-26

## 2021-01-26 DIAGNOSIS — Z01.810 PRE-OPERATIVE CARDIOVASCULAR EXAMINATION: ICD-10-CM

## 2021-01-26 DIAGNOSIS — R06.02 SOB (SHORTNESS OF BREATH): ICD-10-CM

## 2021-01-26 DIAGNOSIS — Z98.890 HISTORY OF PERICARDIECTOMY: Primary | ICD-10-CM

## 2021-01-26 DIAGNOSIS — I42.5 RESTRICTIVE CARDIOMYOPATHY (H): Primary | ICD-10-CM

## 2021-01-26 NOTE — PROGRESS NOTES
Called patient to let her know that Dr Jang will go forward with surgery.  He has asked she get an echocardiogram in the next couple of weeks.  Echo was ordered and patient will call to schedule at her convenience.  She is requesting surgery be early March when her parents return from [place].  Will send surgery request to .

## 2021-02-01 ENCOUNTER — CARE COORDINATION (OUTPATIENT)
Dept: CARDIOLOGY | Facility: CLINIC | Age: 36
End: 2021-02-01

## 2021-02-01 ENCOUNTER — TELEPHONE (OUTPATIENT)
Dept: CARDIOLOGY | Facility: CLINIC | Age: 36
End: 2021-02-01

## 2021-02-01 PROBLEM — Z98.890: Status: ACTIVE | Noted: 2021-02-01

## 2021-02-01 NOTE — LETTER
February 1, 2021        [facility]    [facility]IC SURGERY PRE-OP INSTRUCTIONS  Your Heart Surgery is scheduled with Dr Kirit Jang and Dr Ac James  On Monday March 15th at 7:30 am.  Please arrive at 5:30 am.      Report to the information desk in the front lobby of the hospital at [address] [facility], [place] [id number]. When you walk in the main entrance of the hospital it is directly in front of you. Ask for an escort or the  can help direct you. You will then be escorted or directed to  on the third floor for your surgery preparation.     Beginning January 21st.  [facility] has updated its visitor policy.  We are now allowing one consistent visitor for adult inpatients, outpatients and Emergency Department patients.  One visitor is allowed at clinic appointments for adult patients.  Adult surgical patients can have one visitor only before surgery.  Masks are required 100% of the time.       COVID 19 TESTING  You will be required to undergo COVID 19 testing prior the angiogram and your surgery.  You will be contacted to schedule this test within 48 hours of your surgery.      After the COVID test please protect yourself by following the CDC recommendations for disease prevention.  Wash hands regularly with soap and water and use hand  if soap and water is not available, wear a face mask, separate yourself from others by 6 feet and keep your hands away from your face.      Call your surgery coordinator Aurora Davis RN or the [facility] number ([phone]) if you develop any of the following symptoms; fever, cough, shortness of breath, sore throat, runny or stuffy nose, muscle or body aches, headaches, fatigue, vomiting or diarrhea.      You will spend approximately 5 - 7 days in the hospital depending on how quickly you recover.      INSTRUCTIONS PRIOR TO SURGERY    Please review this letter and the enclosed booklet and other  information in this surgery folder carefully and call Aurora Davis RN at [phone] with any questions or concerns.      MEDICATIONS    ASPIRIN is okay to take up to the day before your surgery but NOT the day of your surgery. Take your other medications as you normally would until the day of surgery unless instructed otherwise. If you do not take aspirin do not start aspirin unless instructed otherwise.         STOP TAKING these medications. STOP ALL ANTIINFLAMMATORY MEDICATIONS: (Ibuprofen, Aleve, Advil, Celebrex, Votaren, Ketoprofen, and Naproxen).  Stop ALL SUPPLEMENTS 10 days prior to your surgery. This includes stopping Co-Q 10, vitamin E and all fish oil supplements.   Please check the labels on any OTC eye vitamins you may be taking.  They often contain vitamin E and should be stopped 10 days prior to surgery.         HISTORY AND PHYSICAL:  LABS and IMAGING  All tests and procedures must be within 30 days of your surgery date.     You do NOT need to fast for the blood work.      You have a pre-op clinic visit beginning at 12:10 pm on Tuesday March 2nd in the [facility] [place], [address] [place] [address]. A  or Coordinator will assist you. The Pre Assessment/Anesthesia Clinic is on the fifth floor.  The laboratory and radiology is on the first floor.      Your schedule is as follows:    12:10 pm EKG  12:30 pm Chest X-ray  12:45 pm Labs    1:15 pm PAC (Pre Assessment and Anesthesia Clinic)  This is your pre op exam.      DO NOT EAT ANY SOLID FOODS AFTER MIDNIGHT or the morning of your surgery. NO MILK, MILK PRODUCTS, SMOOTHIES 8 HOURS PRIOR TO SURGERY.        DO NOT EAT ANYTHING, however you may have any clear liquids; black coffee (sugar okay), clear tea, water, sprite, ginger ale, apple juice, Gatorade or any clear liquid up to two hours before your surgery time. (No avelino tea) No milk, or milk products, no smoothies or juice with pulp.               MEDICATIONS THE  MORNING OF SURGERY  If you take your antidepressant medications in the morning, (Zoloft and Celexa) you may take these the morning of your surgery with a drink of clear liquid.  Please tell your anesthesiologist what medication you took and at what time.     BELONGINGS  Do not bring personal belongings, jewelry, money, valuables, toiletries or medications to the hospital the morning of your surgery. You may pack a bag and give it to a family member or friend to bring the following day or when needed.   Please remove all jewelry, including body piercings. Cautery instruments are used during surgery that may pose a risk of burns if a body piercing is left in during surgery.     Do bring a photo ID and insurance card.  A copy of your health care directives, if you have one.  Glasses and hearing aids (bring cases).  You cannot wear contact lenses during the surgery.  Inhaler(s) and eye drops if you use them, tell the staff about them when you arrive. Bring your CPAP machine or breathing device, if you use them.  If you have a pacemaker or ICD (cardiac defibrillator) bring the ID card.  If you have an implanted stimulator, bring the remote control.  If you are a legal guardian bring a copy of the certified (court-stamped) guardianship papers.      Call Dottie our patient , with any questions or concerns about scheduling. She can be reached by phone at [phone] between 8 AM and 4:30PM Monday through Friday. Our answering service will  calls outside of those business hours.   If you have questions about your medications, test results or have a change in your health status call Aurora Davis RN at [phone] during regular business hours.      On weekends or after 4:30 please call [phone] and ask the  to page the Cardiothoracic Fellow, Nurse Practitioner, Physician Assistant or Staff on call that weekend.       Please feel free to call me or our office with any questions.     Thank  you,         Aurora Davis RN  Cardiothoracic Surgery Coordinator  [phone]  Direct Phone  [phone]  Fax

## 2021-02-01 NOTE — TELEPHONE ENCOUNTER
Per task, pt needs to schedule prep and surgery with Dr. Jang with Dr James assisting. Talked with pt and scheduled preop for 3/2 and surgery is scheduled for 3/15. Will call if anything changes

## 2021-02-03 NOTE — TELEPHONE ENCOUNTER
FUTURE VISIT INFORMATION      SURGERY INFORMATION:    Date: 3.15.21    Location: [facility] OR    Surgeon:  Dr. Jang and Dr. Sewell    Anesthesia Type:  general    Procedure: pericardectomy    Consult: 21    RECORDS REQUESTED FROM:         Most recent EKG+ Tracin21    [facility]    Most recent Coronary Angiogram: 21

## 2021-02-09 ENCOUNTER — TELEPHONE (OUTPATIENT)
Dept: CARDIOLOGY | Facility: CLINIC | Age: 36
End: 2021-02-09

## 2021-02-09 NOTE — TELEPHONE ENCOUNTER
[facility] Call Center    Phone Message    May a detailed message be left on voicemail: yes     Reason for Call: Other: pt stated her job had faxed over an FMLA form which needs to be filled out before surgery, please call pt to confirm that clinic received it, thank you     Action Taken: Message routed to:  [facility] ([facility] heart    Travel Screening: Not Applicable

## 2021-02-09 NOTE — TELEPHONE ENCOUNTER
Called patient and forms have been received and will be returned in a by due date.    X Size Of Lesion In Cm (Optional): 0 Medical Necessity Clause: This procedure was medically necessary because the lesions that were treated were: Consent: The risks of atrophy were reviewed with the patient. Total Volume Injected ([facility]- Only Use Numbers And Decimals): 1.0 Concentration Of Solution Injected (Mg/Ml): 10.0 Kenalog Preparation: Kenalog Detail Level: Detailed Include Z78.9 (Other Specified Conditions Influencing Health Status) As An Associated Diagnosis?: No

## 2021-02-10 ENCOUNTER — ANCILLARY PROCEDURE (OUTPATIENT)
Dept: CARDIOLOGY | Facility: CLINIC | Age: 36
End: 2021-02-10
Attending: SURGERY
Payer: COMMERCIAL

## 2021-02-10 DIAGNOSIS — I42.5 RESTRICTIVE CARDIOMYOPATHY (H): ICD-10-CM

## 2021-02-10 PROCEDURE — 93306 TTE W/DOPPLER COMPLETE: CPT | Performed by: INTERNAL MEDICINE

## 2021-02-19 NOTE — PROGRESS NOTES
History of Present Illness:                     She is a very pleasant, 35-year-old woman with a long-standing history of lupus who has  had recurrent pericardial effusions and pericarditis, who is referred for a second opinion  about whether or not she may have constrictive pericarditis.       Her lupus history begins about 12 years ago when she had arthritis in several joints as well  as ITP.  She was placed on several months of steroids and eventually was on  hydroxychloroquine with good control.  Five years ago, she had her first pericardial effusion,  which was treated with anti-inflammatories.  She has had a recurrence of pericardial effusion  in the last 2 years and also had onset of dyspnea on exertion.  She had abdominal bloating  and lower extremity edema as well as generalized fatigue.  She has had significant evaluation  for this, including right and left heart catheterization as well as a cardiac MRI.  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  This was removed  over a year ago without improvement in her abdominal edema.  She also had several  different paracenteses that had demonstrated transudative fluid.  Of note, her filling  pressures on her right heart catheterization were significantly elevated.  On her cardiac MRI,  she had no fibrosis or evidence of infiltrative disease.       She recently saw her rheumatologist, who felt as though her lupus was under control.  While  during this last episode of pericarditis she had evidence of lupus activity by serology, this  appears to be well controlled.  Presently, she is off prednisone and just on  hydroxychloroquine.  She has no other symptoms of lupus presently.       She denies overt PND or orthopnea, although she does sleep on a few pillows, as it is more  comfortable.  She has not had heart failure admissions.  She can walk a block or 2, but has  really  stopped all exercise due to dyspnea on exertion.            Review of Systems:    10 point review of systems negative except for stated above in HPI.           Past Medical History:   Medical History reviewed.   Past Medical History        Past Medical History:   Diagnosis Date     SLE (systemic lupus erythematosus related syndrome) (H)                   Past Surgical History:   Surgical History reviewed.   Past Surgical History         Past Surgical History:   Procedure Laterality Date     wisdom teeth                     Social History:   Social History reviewed.  Social History            Tobacco Use     Smoking status: Never Smoker     Smokeless tobacco: Never Used   Substance Use Topics     Alcohol use: Yes       Alcohol/week: 0.0 standard drinks       Comment: rarely               Family History:   Family History reviewed.   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       Current Facility-Administered Medications   Medication     aspirin (ASA) EC tablet 325 mg     lidocaine (LMX4) cream     lidocaine 1 % 0.1-1 mL     Patient is NOT allergic to contrast dye OR was given pre-procedure oral steroids for treatment     sodium chloride (PF) 0.9% PF flush 3 mL     sodium chloride (PF) 0.9% PF flush 3 mL     sodium chloride 0.9% infusion              Physical Exam:   Vitals were reviewed.  Blood pressure 102/70, pulse 88, temperature 98.6  F (37  C), temperature source Oral, SpO2 99 %, not currently breastfeeding.     General: AAOx3, NAD  Skin: Not jaundiced, no rash, no ecchymoses  HEENT: MMM, PERRLA, EOM intact  CV: RRR, normal S1S2, no murmur,  clicks, rubs  Resp: Clear to auscultation bilaterally, no wheezes, rhonchi  Abd: Soft, non-tender, BS+, no masses appreciated  Extremities: warm and well perfused, palpable pulses, no edema  Neuro: No lateralizing symptoms or focal neurologic deficits         Labs:   Routine Labs:  No results found for: TROPI, TROPONIN, TROPR, TROPN  CMP      Recent Labs   Lab 01/12/21  0912      POTASSIUM 3.8   CHLORIDE 106   CO2 29   ANIONGAP 6   GLC 87   BUN 10   CR 0.83   GFRESTIMATED >90   GFRESTBLACK >90   BARTOLO 8.8      CBC      Recent Labs   Lab 01/12/21 0912   WBC 3.8*   RBC 4.14   HGB 12.6   HCT 39.4   MCV 95   MCH 30.4   MCHC 32.0   RDW 12.2         INR      Recent Labs   Lab 01/12/21 0912   INR 1.26*          Diagnostics:       ECHO 9/2020:  CONCLUSIONS  1. Left ventricular chamber size is normal. Normal left ventricular  wall thickness. Global and regional left ventricular function is  normal. Left ventricular ejection fraction is visually estimated at  60%. Normal left ventricular diastolic function.  2. Normal right ventricle size and normal global function.  3. The cardiac valves are normal.  4. No pulmonary hypertension.  5. Dilation of the inferior vena cava (> 2.1 cm) with abnormal  respiratory variation in diameter. Estimated right atrial pressure is  > 15 mmHg.  6. There is no pericardial effusion.  7. Compared to the previous study done on 5/14/20, the central venous  pressure has increased.      EXAMINATION: CT CHEST W/O CONTRAST, 1/12/2021 8:54 AM         FINDINGS:      The central tracheobronchial tree is patent. Heart size is normal. No   pleural effusion or pneumothorax. Trace pericardial effusion. Diffuse   pericardial thickening, most prominent over the right ventricle   measuring 6 mm in maximal thickness. No focal airspace consolidation.   No axillary or mediastinal adenopathy. Normal arch anatomy. Thyroid   lobes are normal. Small volume perihepatic ascites. Splenomegaly. No   Suspicious  soft tissue or osseous lesions.                                                                       IMPRESSION:    1.  Pericardial thickening and trace pericardial effusion raise the   question of pericarditis. The atria are normal in size, making   restrictive cardiomyopathy less likely.  Consider CMR.   2. Small volume perihepatic ascites and splenomegaly         Conclusion      Right sided filling pressures are moderately elevated.    Normal PA pressures.    Left sided filling pressures are moderately elevated.    Left ventricular filling pressures are mildly elevated.    No evidence of constrictive physiology.    Normal coronary angiogram       Assessment and Plan:     Sissy Donaldson is a 35 year old with long standing lupus and recurrent pericarditis with effusions, now with heart failure symptoms concerning for constrictive pericarditis.  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 I will repeat an echo, I did discuss the risks and benefits of surgery for constrictive pericarditis with a complete pericardiectomy including the risks of death, bleeding, stroke, infection, renal failure and arrhythmias. She understands and is willing to proceed with this.

## 2021-02-21 ENCOUNTER — HEALTH MAINTENANCE LETTER (OUTPATIENT)
Age: 36
End: 2021-02-21

## 2021-02-25 DIAGNOSIS — Z11.59 ENCOUNTER FOR SCREENING FOR OTHER VIRAL DISEASES: ICD-10-CM

## 2021-03-02 ENCOUNTER — ANESTHESIA EVENT (OUTPATIENT)
Dept: SURGERY | Facility: CLINIC | Age: 36
End: 2021-03-02

## 2021-03-02 ENCOUNTER — PRE VISIT (OUTPATIENT)
Dept: SURGERY | Facility: CLINIC | Age: 36
End: 2021-03-02

## 2021-03-02 ENCOUNTER — OFFICE VISIT (OUTPATIENT)
Dept: SURGERY | Facility: CLINIC | Age: 36
End: 2021-03-02
Payer: COMMERCIAL

## 2021-03-02 ENCOUNTER — ANCILLARY PROCEDURE (OUTPATIENT)
Dept: GENERAL RADIOLOGY | Facility: CLINIC | Age: 36
End: 2021-03-02
Attending: SURGERY
Payer: COMMERCIAL

## 2021-03-02 VITALS
HEIGHT: 65 IN | SYSTOLIC BLOOD PRESSURE: 118 MMHG | OXYGEN SATURATION: 100 % | RESPIRATION RATE: 12 BRPM | DIASTOLIC BLOOD PRESSURE: 82 MMHG | WEIGHT: 145 LBS | HEART RATE: 73 BPM | TEMPERATURE: 97.9 F | BODY MASS INDEX: 24.16 KG/M2

## 2021-03-02 DIAGNOSIS — Z01.818 PREOP EXAMINATION: Primary | ICD-10-CM

## 2021-03-02 DIAGNOSIS — Z01.810 PRE-OPERATIVE CARDIOVASCULAR EXAMINATION: ICD-10-CM

## 2021-03-02 DIAGNOSIS — R06.02 SOB (SHORTNESS OF BREATH): ICD-10-CM

## 2021-03-02 LAB
ALBUMIN SERPL-MCNC: 4.3 G/DL (ref 3.4–5)
ALBUMIN UR-MCNC: NEGATIVE MG/DL
ALP SERPL-CCNC: 86 U/L (ref 40–150)
ALT SERPL W P-5'-P-CCNC: 22 U/L (ref 0–50)
ANION GAP SERPL CALCULATED.3IONS-SCNC: 6 MMOL/L (ref 3–14)
APPEARANCE UR: CLEAR
APTT PPP: 38 SEC (ref 22–37)
AST SERPL W P-5'-P-CCNC: 22 U/L (ref 0–45)
BILIRUB DIRECT SERPL-MCNC: 0.2 MG/DL (ref 0–0.2)
BILIRUB SERPL-MCNC: 0.7 MG/DL (ref 0.2–1.3)
BILIRUB UR QL STRIP: NEGATIVE
BUN SERPL-MCNC: 12 MG/DL (ref 7–30)
CALCIUM SERPL-MCNC: 9 MG/DL (ref 8.5–10.1)
CHLORIDE SERPL-SCNC: 103 MMOL/L (ref 94–109)
CO2 SERPL-SCNC: 32 MMOL/L (ref 20–32)
COLOR UR AUTO: NORMAL
CREAT SERPL-MCNC: 0.75 MG/DL (ref 0.52–1.04)
ERYTHROCYTE [DISTWIDTH] IN BLOOD BY AUTOMATED COUNT: 12.6 % (ref 10–15)
GFR SERPL CREATININE-BSD FRML MDRD: >90 ML/MIN/{1.73_M2}
GLUCOSE SERPL-MCNC: 86 MG/DL (ref 70–99)
GLUCOSE UR STRIP-MCNC: NEGATIVE MG/DL
HCT VFR BLD AUTO: 38.7 % (ref 35–47)
HGB BLD-MCNC: 12.3 G/DL (ref 11.7–15.7)
HGB UR QL STRIP: NEGATIVE
INR PPP: 1.32 (ref 0.86–1.14)
KETONES UR STRIP-MCNC: NEGATIVE MG/DL
LEUKOCYTE ESTERASE UR QL STRIP: NEGATIVE
MCH RBC QN AUTO: 30.1 PG (ref 26.5–33)
MCHC RBC AUTO-ENTMCNC: 31.8 G/DL (ref 31.5–36.5)
MCV RBC AUTO: 95 FL (ref 78–100)
NITRATE UR QL: NEGATIVE
PH UR STRIP: 7 PH (ref 5–7)
PLATELET # BLD AUTO: 183 10E9/L (ref 150–450)
POTASSIUM SERPL-SCNC: 4.1 MMOL/L (ref 3.4–5.3)
PROT SERPL-MCNC: 7.5 G/DL (ref 6.8–8.8)
RBC # BLD AUTO: 4.08 10E12/L (ref 3.8–5.2)
RBC #/AREA URNS AUTO: 0 /HPF (ref 0–2)
SODIUM SERPL-SCNC: 140 MMOL/L (ref 133–144)
SOURCE: NORMAL
SP GR UR STRIP: 1 (ref 1–1.03)
SQUAMOUS #/AREA URNS AUTO: <1 /HPF (ref 0–1)
UROBILINOGEN UR STRIP-MCNC: 0 MG/DL (ref 0–2)
WBC # BLD AUTO: 4.9 10E9/L (ref 4–11)
WBC #/AREA URNS AUTO: <1 /HPF (ref 0–5)

## 2021-03-02 PROCEDURE — 71046 X-RAY EXAM CHEST 2 VIEWS: CPT | Mod: GC | Performed by: RADIOLOGY

## 2021-03-02 PROCEDURE — 85610 PROTHROMBIN TIME: CPT | Performed by: PATHOLOGY

## 2021-03-02 PROCEDURE — 86923 COMPATIBILITY TEST ELECTRIC: CPT | Mod: 90 | Performed by: PATHOLOGY

## 2021-03-02 PROCEDURE — 80048 BASIC METABOLIC PNL TOTAL CA: CPT | Performed by: PATHOLOGY

## 2021-03-02 PROCEDURE — 86900 BLOOD TYPING SEROLOGIC ABO: CPT | Mod: 90 | Performed by: PATHOLOGY

## 2021-03-02 PROCEDURE — 86850 RBC ANTIBODY SCREEN: CPT | Mod: 90 | Performed by: PATHOLOGY

## 2021-03-02 PROCEDURE — 86901 BLOOD TYPING SEROLOGIC RH(D): CPT | Mod: 90 | Performed by: PATHOLOGY

## 2021-03-02 PROCEDURE — 99204 OFFICE O/P NEW MOD 45 MIN: CPT | Performed by: PHYSICIAN ASSISTANT

## 2021-03-02 PROCEDURE — 81001 URINALYSIS AUTO W/SCOPE: CPT | Performed by: PATHOLOGY

## 2021-03-02 PROCEDURE — 80076 HEPATIC FUNCTION PANEL: CPT | Performed by: PATHOLOGY

## 2021-03-02 PROCEDURE — 36415 COLL VENOUS BLD VENIPUNCTURE: CPT | Performed by: PATHOLOGY

## 2021-03-02 PROCEDURE — 85027 COMPLETE CBC AUTOMATED: CPT | Performed by: PATHOLOGY

## 2021-03-02 PROCEDURE — 85730 THROMBOPLASTIN TIME PARTIAL: CPT | Performed by: PATHOLOGY

## 2021-03-02 ASSESSMENT — MIFFLIN-ST. JEOR: SCORE: 1353.6

## 2021-03-02 ASSESSMENT — ENCOUNTER SYMPTOMS
STRIDOR: 0
SEIZURES: 0

## 2021-03-02 ASSESSMENT — PAIN SCALES - GENERAL: PAINLEVEL: NO PAIN (0)

## 2021-03-02 ASSESSMENT — LIFESTYLE VARIABLES: TOBACCO_USE: 0

## 2021-03-02 NOTE — PATIENT INSTRUCTIONS
Preparing for Your Surgery      Name:  Sissy Donaldson   MRN:  [id number]   :  1985   Today's Date:  3/2/2021       Arriving for surgery:  Surgery date:  3/15/21  Arrival time:  5:30AM    Restrictions due to COVID 19:  One consistent visitor per patient is allowed  No ill visitors  All visitors must wear face mask     parking is available for anyone with mobility limitations or disabilities.  ([facility]  24 hours/ 7 days a week; [facility] [place], [place]  7 am- 3:30 pm, Mon- Fri)    Please come to:     [facility] [address]  [address] [place] [address]    When entering the hospital you will be asked COVID screening questions, you will then be directed to Security and registration.  Registration will direct you to the correct lobby to wait until escorted to the preop area. Preop number- [phone]     - ?If you are in need of directions, wheelchair or escort please stop at the Information Desk in the lobby.  Inform the information person that you are here for surgery; a wheelchair and escort to [address] will be provided.?     What can I eat or drink?  -  You may eat and drink normally for up to 8 hours before your surgery. (Until 3/14/21, 11:30PM)  -  You may have clear liquids until 2 hours before surgery. (Until 3/15/21, 5:30AM)    Examples of clear liquids:  Water  Clear broth  Juices (apple, white grape, white cranberry  and cider) without pulp  Noncarbonated, powder based beverages  (lemonade and Jaxon-Aid)  Sodas (Sprite, 7-Up, ginger ale and seltzer)  Coffee or tea (without milk or cream)  Gatorade    -  No Alcohol for at least 24 hours before surgery     Which medicines can I take?    Hold Aspirin for 7 days before surgery.   Hold Multivitamins for 7 days before surgery.  Hold Supplements for 7 days before surgery.  Hold Ibuprofen (Advil, Motrin) for 1 day before surgery--unless otherwise directed by surgeon.  Hold Naproxen (Aleve) for 4  days before surgery.    -  DO NOT take these medications the day of surgery:    Vitamin D   Spironolactone(Aldactone)    Torsemide(Demadex)    -  PLEASE TAKE these medications the day of surgery:    Hydroxychloroquine(Plaquenil)  Sertraline(Zoloft)    Acetaminophen(Tylenol) as needed    How do I prepare myself?  - Please take 2 showers before surgery using Scrubcare or Hibiclens soap.    Use this soap only from the neck to your toes.     Leave the soap on your skin for one minute--then rinse thoroughly.      You may use your own shampoo and conditioner; no other hair products.   - Please remove all jewelry and body piercings.  - No lotions, deodorants or fragrance.  - No makeup or fingernail polish.   - Bring your ID and insurance card.    - All patients are required to have a Covid-19 test within 4 days of surgery/procedure.      -Patients will be contacted by the [facility] scheduling team within 1 week of surgery to make an appointment.      - Patients may call the Scheduling team at [phone] if they have not been scheduled within 4 days of  surgery.      Questions or Concerns:    - For any questions regarding the day of surgery or your hospital stay, please contact the Pre Admission Nursing Office at [phone].       - If you have health changes between today and your surgery please call your surgeon.       For questions after surgery please call your surgeons office.

## 2021-03-02 NOTE — ANESTHESIA PREPROCEDURE EVALUATION
Anesthesia Pre-Procedure Evaluation    Patient: Sissy Donaldson   MRN: [id number] : 1985        Preoperative Diagnosis: * No surgery found *   Procedure :      Past Medical History:   Diagnosis Date     SLE (systemic lupus erythematosus related syndrome) (H)       Past Surgical History:   Procedure Laterality Date     CV CORONARY ANGIOGRAM N/A 2021    Procedure: CV CORONARY ANGIOGRAM;  Surgeon: Justin Ortega MD;  [facility]     CV LEFT HEART CATH N/A 2021    Procedure: CV LEFT HEART CATH;  Surgeon: Justin Ortega MD;  [facility]     CV RIGHT HEART CATH MEASUREMENTS RECORDED N/A 2021    Procedure: CV RIGHT HEART CATH;  Surgeon: Justin Ortega MD;  [facility]     wisdom teeth        Allergies   Allergen Reactions     Sulfamethoxazole-Trimethoprim Rash     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     History of anesthetic complications  - PONV.  slow to wake after ovarian cyst removal.  also had severe headache for several days after heart cath at [facility]. .    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 Previous cardiac testing   Echo: Date: 2/10/21 Results:  Interpretation Summary  There is pericardial  tethering, septal bounce, and dilated IVC. While these  findings are supportive of pericardial constriction, other typical findings of  constriction (respirophasic septal shift, AV valve inflow velocities and  hepatic vein velocities suggestive of enhanced ventricular interaction, and  annulus reversus Tissue Doppler pattern) are not present.  Tissue Dopple e' velocities are normal, suggesting against restrictive  cardiomyopathy.     Left ventricular function, chamber size, wall motion, and wall thickness are  normal.The EF is 55-60%.  Right ventricular function, chamber size, wall motion, and thickness are  normal.  No significant valvular abnormalities were noted.  Previous study not available for comparison.  Stress Test: Date: Results:    ECG Reviewed: Date: 3/2/21 Results:  SR  T wave abnormality  Consider anterior ischemia  Cath:  Date: 1/12/21 Results:    Right sided filling pressures are moderately elevated.    Normal PA pressures.    Left sided filling pressures are moderately elevated.    Left ventricular filling pressures are mildly elevated.    No evidence of constrictive physiology.    Normal coronary angiogram.    METS/Exercise Tolerance:     Hematologic:     (+) history of blood transfusion, no previous transfusion reaction,  (-) history of blood clots   Musculoskeletal:  - neg musculoskeletal ROS     GI/Hepatic:  - neg GI/hepatic ROS  (-) GERD   Renal/Genitourinary:  - neg Renal ROS     Endo:  - neg endo ROS     Psychiatric/Substance Use:     (+) psychiatric history anxiety  (-) alcohol abuse history and chronic opioid use history   Infectious Disease:  - neg infectious disease ROS     Malignancy:  - neg malignancy ROS     Other:     (-) Any chance pregnant       Physical Exam    Airway             Respiratory Devices and Support         Dental       (+) lower retainer      Cardiovascular             Pulmonary           (-) no stridor    Other findings: Upper invisalign braces  Lower permanent  retainer wire    OUTSIDE LABS:  CBC:   Lab Results   Component Value Date    WBC 4.9 03/02/2021    WBC 3.8 (L) 01/12/2021    HGB 12.3 03/02/2021    HGB 12.6 01/12/2021    HCT 38.7 03/02/2021    HCT 39.4 01/12/2021     03/02/2021     01/12/2021     BMP:   Lab Results   Component Value Date     01/12/2021     12/10/2020    POTASSIUM 3.8 01/12/2021    POTASSIUM 4.0 12/10/2020    CHLORIDE 106 01/12/2021    CHLORIDE 105 12/10/2020    CO2 29 01/12/2021    CO2 29 12/10/2020    BUN 10 01/12/2021    BUN 11 12/10/2020    CR 0.83 01/12/2021    CR 0.72 12/10/2020    GLC 87 01/12/2021    GLC 86 12/10/2020     COAGS:   Lab Results   Component Value Date    PTT 35 01/12/2021    INR 1.26 (H) 01/12/2021     POC: No results found for: BGM, HCG, HCGS  HEPATIC: No results found for: ALBUMIN, PROTTOTAL, ALT, AST, GGT, ALKPHOS, BILITOTAL, BILIDIRECT, MARIO  OTHER:   Lab Results   Component Value Date    BARTOLO 8.8 01/12/2021             PAC Discussion and Assessment    ASA Classification: 3  Case is suitable for: [place]  Anesthetic techniques and relevant risks discussed: GA  Invasive monitoring and risk discussed: No    Possibility and Risk of blood transfusion discussed: Yes            PAC Resident/NP Anesthesia Assessment: Sissy Donaldson is a 35 year old female scheduled for pericardectomy on 3/15/21 by Dr. Jang in treatment of pericarditis.  PAC referral for risk assessment and optimization for anesthesia with comorbid conditions of restrictive cardiomyopathy, systemic lupus erythematosus, GERD, anxiety:    Pre-operative considerations:  1.  Cardiac:  Functional status- METS 4.  Pt has HOUSER and avoids stairs but can climb one flight.  She can walk 1- 2 blocks.   Denies chest pain, SOB at rest, orthopnea.  She endorses some intermittent, mild peripheral edema (ankles).  She notes abdominal fullness.  --echo 2/10/21 55-60% EF, dilated IVC, septal bounce  --right and left heart cath: no CAD, right and left  side elevated filling pressures, normal PA pressure  --EKG 3/2/21, SR, T wave abnormality, consider anterior ischemia.    2.  Pulm:  Airway feasible.  TARAH risk: Low.  Never smoker.  3.  GI:  Risk of PONV score = 3.  If > 2, anti-emetic intervention recommended.   4.  Psych: h/o anxiety, may take Zoloft DOS.  5.  Rheum: h/o SLE:  Thrombocytopenia (resolved), inflammatory arthritis, positive DELFINA, positive SSA, positive anti-native DNA antibody (transient), positive lupus anticoagulant with no prior history of thrombotic event or pregnancy complication.  --followed by outside rheumatology, [facility]  --will continue Plaquenil      VTE risk: 0.26%    Patient is optimized and is acceptable candidate for the proposed procedure.  No further diagnostic evaluation is needed.         **For further details of assessment, testing, and physical exam please see H and P completed on same date.          Janey Galvez PA-C, [facility]    Reviewed and Signed by PAC Mid-Level Provider/Resident  Mid-Level Provider/Resident: Janey Galvez  Date: 3/2/21                                 Janey Galvez PA-C

## 2021-03-02 NOTE — H&P
Pre-Operative H & P     CC:  Preoperative exam to assess for increased cardiopulmonary risk while undergoing surgery and anesthesia.    Date of Encounter: 3/2/2021  Primary Care Physician:  No Ref-Primary, Physician  Associated Diagnosis: pericarditis    HPI  Sissy Donaldson is a 35 year old female who presents for pre-operative H & P in preparation for pericardectomy with Dr. Jang on 3/15/21 at [facility]. General anesthesia.    This is a 35-year-old female patient with past medical history significant for recurrent pericardial effusions, pericarditis, systemic lupus erythematosus, inflammatory arthritis TTP (now resolved).  First pericardial effusion was approximately 5 years ago treated with anti-inflammatories.  She has had recurrence of pericardial effusion and has had approximately 3 years of dyspnea on exertion.  She also complains of some abdominal fullness or bloating and some mild intermittent lower extremity edema in her ankles.  She had a recent right and left heart cath, her filling pressures on right and left side were elevated more so on the right side.  She had a cardiac MR in August 2019 which did not show any fibrosis or infiltrative disease.  She is followed by rheumatology through [facility] and she is taking Plaquenil.    History is obtained from the patient and the medical record.    Past Medical History  Past Medical History:   Diagnosis Date     SLE (systemic lupus erythematosus related syndrome) (H)        Past Surgical History  Past Surgical History:   Procedure Laterality Date     CV CORONARY ANGIOGRAM N/A 1/12/2021    Procedure: CV CORONARY ANGIOGRAM;  Surgeon: Justin Ortega MD;  Location:  [facility]     CV LEFT HEART CATH N/A 1/12/2021    Procedure: CV LEFT HEART CATH;  Surgeon: Justin Ortega MD;  Location:  [facility]ARD[facility] CATH [facility]     CV RIGHT HEART CATH MEASUREMENTS RECORDED N/A 1/12/2021     Procedure: CV RIGHT HEART CATH;  Surgeon: Justin Ortega MD;  Location:  [facility] CARD[facility] CATH LAB     wisdom teeth         Hx of Blood transfusions/reactions: yes, no reaction     Hx of abnormal bleeding or anti-platelet use: denies    Menstrual history: Patient's last menstrual period was 03/01/2021.:     Steroid use in the last year: denies    Personal or FH with difficulty with Anesthesia:  PONV, see below    Prior to Admission Medications  Current Outpatient Medications   Medication Sig Dispense Refill     acetaminophen (TYLENOL) 325 MG tablet Take 325 mg by mouth every 6 hours as needed       cholecalciferol 50 MCG (2000 UT) tablet Take 2,000 Units by mouth every morning        hydroxychloroquine (PLAQUENIL) 200 MG tablet every morning        metroNIDAZOLE (METROCREAM) 0.75 % external cream as needed        sertraline (ZOLOFT) 100 MG tablet Take 100 mg by mouth every morning        spironolactone (ALDACTONE) 25 MG tablet Take 12.5 mg by mouth every morning        sulfacetamide sodium-sulfur 10-5 % LOTN as needed        torsemide (DEMADEX) 10 MG tablet Take 20 mg by mouth every morning          Allergies  Allergies   Allergen Reactions     Sulfamethoxazole-Trimethoprim Rash     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    Janes Cb Syndrome    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         Social History  Social History     Socioeconomic History     Marital status:      Spouse name: Not on file     Number of children: Not on file     Years of education: Not on file     Highest education level: Not on file   Occupational History     Not on file   Social Needs     Financial resource strain: Not on file     Food insecurity     Worry: Not on file     Inability: Not on file     Transportation needs     Medical: Not on file     Non-medical: Not on file   Tobacco Use     Smoking status: Never Smoker     Smokeless tobacco: Never Used   Substance and Sexual Activity      Alcohol use: Yes     Alcohol/week: 0.0 standard drinks     Comment: rarely      Drug use: No     Sexual activity: Not on file   Lifestyle     Physical activity     Days per week: Not on file     Minutes per session: Not on file     Stress: Not on file   Relationships     Social connections     Talks on phone: Not on file     Gets together: Not on file     Attends [employer] service: Not on file     Active member of club or organization: Not on file     Attends meetings of clubs or organizations: Not on file     Relationship status: Not on file     Intimate partner violence     Fear of current or ex partner: Not on file     Emotionally abused: Not on file     Physically abused: Not on file     Forced sexual activity: Not on file   Other Topics Concern     Not on file   Social History Narrative     Not on file       Family History  Family History   Problem Relation Age of Onset     Breast Cancer Paternal Grandmother      Heart Disease Maternal Grandfather            Anesthesia Evaluation   Pt has had prior anesthetic.     History of anesthetic complications  - PONV.  slow to wake after ovarian cyst removal.  also had severe headache for several days after heart cath at [facility]. .    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 Previous cardiac testing   Echo: Date: 2/10/21 Results:  Interpretation Summary  There is pericardial tethering, septal bounce, and dilated IVC. While these  findings are supportive of pericardial constriction, other typical findings of  constriction (respirophasic septal shift, AV valve inflow velocities and  hepatic vein velocities suggestive of enhanced ventricular interaction, and  annulus reversus Tissue Doppler pattern) are not  "present.  Tissue Dopple e' velocities are normal, suggesting against restrictive  cardiomyopathy.     Left ventricular function, chamber size, wall motion, and wall thickness are  normal.The EF is 55-60%.  Right ventricular function, chamber size, wall motion, and thickness are  normal.  No significant valvular abnormalities were noted.  Previous study not available for comparison.  Stress Test: Date: Results:    ECG Reviewed: Date: 3/2/21 Results:  SR  T wave abnormality  Consider anterior ischemia  Cath:  Date: 1/12/21 Results:    Right sided filling pressures are moderately elevated.    Normal PA pressures.    Left sided filling pressures are moderately elevated.    Left ventricular filling pressures are mildly elevated.    No evidence of constrictive physiology.    Normal coronary angiogram.    METS/Exercise Tolerance:  3   Hematologic:     (+) history of blood transfusion, no previous transfusion reaction,  (-) history of blood clots   Musculoskeletal:  - neg musculoskeletal ROS     GI/Hepatic:  - neg GI/hepatic ROS  (-) GERD   Renal/Genitourinary:  - neg Renal ROS     Endo:  - neg endo ROS     Psychiatric/Substance Use:     (+) psychiatric history anxiety  (-) alcohol abuse history and chronic opioid use history   Infectious Disease:  - neg infectious disease ROS     Malignancy:  - neg malignancy ROS     Other:     (-) Any chance pregnant       The complete review of systems is negative other than noted in the HPI or here.   Temp: 97.9  F (36.6  C) Temp src: Oral BP: 118/82 Pulse: 73   Resp: 12 SpO2: 100 %         145 lbs 0 oz  5' 5\"[pt reported[   Body mass index is 24.13 kg/m .       Physical Exam  Constitutional: Awake, alert, cooperative, no apparent distress, and appears stated age.  Eyes: Pupils equal, round and reactive to light, extra ocular muscles intact, sclera clear, conjunctiva normal.  HENT: Normocephalic, oral pharynx with moist mucus membranes, good dentition. No goiter appreciated. "   Respiratory: Clear to auscultation bilaterally, no crackles or wheezing.  Cardiovascular: Regular rate and rhythm, normal S1 and S2, and no murmur noted.  Carotids +2, no bruits. No edema. Palpable pulses to radial  DP and PT arteries.   GI: Normal bowel sounds, soft, non-distended, non-tender  Lymph/Hematologic: No cervical lymphadenopathy and no supraclavicular lymphadenopathy.  Genitourinary:  deferred  Skin: Warm and dry.  No rashes at anticipated surgical site.   Musculoskeletal: Full ROM of neck. There is no redness, warmth, or swelling of the joints. Gross motor strength is normal.    Neurologic: Awake, alert, oriented to name, place and time. Cranial nerves II-XII are grossly intact. Gait is normal.   Neuropsychiatric: Calm, cooperative. Normal affect.     PRIOR LABS/DIAGNOSTIC STUDIES:  All labs and imaging personally reviewed    Component      Latest Ref Rng & Units 3/2/2021   Sodium      133 - 144 mmol/L 140   Potassium      3.4 - 5.3 mmol/L 4.1   Chloride      94 - 109 mmol/L 103   Carbon Dioxide      20 - 32 mmol/L 32   Anion Gap      3 - 14 mmol/L 6   Glucose      70 - 99 mg/dL 86   Urea Nitrogen      7 - 30 mg/dL 12   Creatinine      0.52 - 1.04 mg/dL 0.75   GFR Estimate      >60 mL/min/1.73:m2 >90   GFR Estimate If Black      >60 mL/min/1.73:m2 >90   Calcium      8.5 - 10.1 mg/dL 9.0   Bilirubin Direct      0.0 - 0.2 mg/dL 0.2   Bilirubin Total      0.2 - 1.3 mg/dL 0.7   Albumin      3.4 - 5.0 g/dL 4.3   Protein Total      6.8 - 8.8 g/dL 7.5   Alkaline Phosphatase      40 - 150 U/L 86   ALT      0 - 50 U/L 22   AST      0 - 45 U/L 22     Component      Latest Ref Rng & Units 3/2/2021   WBC      4.0 - 11.0 10e9/L 4.9   RBC Count      3.8 - 5.2 10e12/L 4.08   Hemoglobin      11.7 - 15.7 g/dL 12.3   Hematocrit      35.0 - 47.0 % 38.7   MCV      78 - 100 fl 95   MCH      26.5 - 33.0 pg 30.1   MCHC      31.5 - 36.5 g/dL 31.8   RDW      10.0 - 15.0 % 12.6   Platelet Count      150 - 450 10e9/L 183    PTT      22 - 37 sec 38 (H)   INR      0.86 - 1.14 1.32 (H)       EKG: Personally reviewed but formal cardiology read pending: 3/2/21  Sinus rhythm  T wave abnormality, consider anterior ischemia  Abnormal ECG  When compared with ECG of 12-JAN-2021 11:05,  No significant change was found      Cardiac echo: 2/10/21  Interpretation Summary  There is pericardial tethering, septal bounce, and dilated IVC. While these  findings are supportive of pericardial constriction, other typical findings of  constriction (respirophasic septal shift, AV valve inflow velocities and  hepatic vein velocities suggestive of enhanced ventricular interaction, and  annulus reversus Tissue Doppler pattern) are not present.  Tissue Dopple e' velocities are normal, suggesting against restrictive  cardiomyopathy.      EXAMINATION: CT CHEST W/O CONTRAST, 1/12/2021 8:54 AM  IMPRESSION:   1.  Pericardial thickening and trace pericardial effusion raise the  question of pericarditis. The atria are normal in size, making  restrictive cardiomyopathy less likely.  Consider CMR.  2. Small volume perihepatic ascites and splenomegaly       MR Cardiac w/wo IV cont  3/8/2019:  IMPRESSION:    1)   Normal left ventricular chamber size  with calculated left ventricular ejection fraction 58%.   2)   Normal right ventricular chamber size and systolic function.   3)   Pericardial thickness appears to be normal. Trivial pericardial effusion. During free breathing, there is no evidence of ventricular interdependence.    4)   Patient was given gadolinium and delayed hyperenhancement study was performed.  There was no myocardial hyperenhancement noted following gadolinium infusion, there by negating the presence of previous myocardial infarction, fibrosis, infiltrative cardiomyopathy and/or scarring.   5)   With gadolinium, there is hyperenhancement of the pericardium suggestive of acute inflammatory pericarditis.      US Abd RUQ organs: 9/16/20  IMPRESSION:     1.  Small amounts of ascites are seen in the 4 quadrants.  2. Normal sonographic appearance of the gallbladder.   3. Subcentimeter stone in the right kidney.    Outside records reviewed from: care everywhere        ASSESSMENT and PLAN  Sissy Donaldson is a 35 year old female scheduled for pericardectomy on 3/15/21 by Dr. Jang in treatment of h/o pericardectomy.  PAC referral for risk assessment and optimization for anesthesia with comorbid conditions of restrictive cardiomyopathy, systemic lupus erythematosus, GERD, anxiety:    Pre-operative considerations:  1.  Cardiac:  Functional status- METS 4.  Pt has HOUSER and avoids stairs but can climb one flight.  She can walk 1- 2 blocks.   Denies chest pain, SOB at rest, orthopnea.  She endorses some intermittent, mild peripheral edema (ankles).  She notes abdominal fullness.  --echo 2/10/21 55-60% EF, dilated IVC, septal bounce  --right and left heart cath: no CAD, elevated right and left side filling pressures, normal PA pressure  --EKG 3/2/21, SR, T wave abnormality, consider anterior ischemia.  No change from prior EKG.  2.  Pulm:  Airway feasible.  TARAH risk: Low.  Never smoker.  3.  GI:  Risk of PONV score = 3.  If > 2, anti-emetic intervention recommended.   4.  Psych: h/o anxiety, may take Zoloft DOS.  5.  Rheum: h/o SLE:  Thrombocytopenia (resolved), inflammatory arthritis, positive DELFINA, positive SSA, positive anti-native DNA antibody (transient), positive lupus anticoagulant with no prior history of thrombotic event or pregnancy complication.  --followed by outside rheumatology, [facility]  --will continue Plaquenil      VTE risk: 0.26%    Patient is optimized and is acceptable candidate for the proposed procedure.  No further diagnostic evaluation is needed.               Janey Galvez PA-C  Preoperative Assessment Center  [facility]  Phone: [phone]  Fax: [phone]

## 2021-03-03 ENCOUNTER — CARE COORDINATION (OUTPATIENT)
Dept: CARDIOLOGY | Facility: CLINIC | Age: 36
End: 2021-03-03

## 2021-03-03 LAB — INTERPRETATION ECG - MUSE: NORMAL

## 2021-03-03 NOTE — PROGRESS NOTES
Called patient and reviewed education regarding day of surgery, inserting lines for monitoring, chest tubes, urinary catheter, visiting policy, ICU stay and transferring to cardiac unit.  Patient verbalized understanding and will call with any further questions or concerns.

## 2021-03-10 DIAGNOSIS — I42.5 RESTRICTIVE CARDIOMYOPATHY (H): Primary | ICD-10-CM

## 2021-03-10 RX ORDER — DEXMEDETOMIDINE HYDROCHLORIDE 4 UG/ML
0.2-1.2 INJECTION, SOLUTION INTRAVENOUS CONTINUOUS
Status: CANCELLED | OUTPATIENT
Start: 2021-03-10

## 2021-03-10 RX ORDER — CHLORHEXIDINE GLUCONATE ORAL RINSE 1.2 MG/ML
10 SOLUTION DENTAL ONCE
Status: CANCELLED | OUTPATIENT
Start: 2021-03-10 | End: 2021-03-10

## 2021-03-10 RX ORDER — CEFAZOLIN SODIUM 1 G/50ML
1 INJECTION, SOLUTION INTRAVENOUS SEE ADMIN INSTRUCTIONS
Status: CANCELLED | OUTPATIENT
Start: 2021-03-10

## 2021-03-10 RX ORDER — LIDOCAINE 40 MG/G
CREAM TOPICAL
Status: CANCELLED | OUTPATIENT
Start: 2021-03-10

## 2021-03-10 RX ORDER — CEFAZOLIN SODIUM 2 G/50ML
2 SOLUTION INTRAVENOUS
Status: CANCELLED | OUTPATIENT
Start: 2021-03-10

## 2021-03-10 RX ORDER — ASPIRIN 81 MG/1
162 TABLET, CHEWABLE ORAL
Status: CANCELLED | OUTPATIENT
Start: 2021-03-10

## 2021-03-10 RX ORDER — FAMOTIDINE 20 MG/1
20 TABLET, FILM COATED ORAL
Status: CANCELLED | OUTPATIENT
Start: 2021-03-10

## 2021-03-10 RX ORDER — ACETAMINOPHEN 325 MG/1
975 TABLET ORAL ONCE
Status: CANCELLED | OUTPATIENT
Start: 2021-03-10 | End: 2021-03-10

## 2021-03-10 RX ORDER — ASPIRIN 81 MG/1
81 TABLET, CHEWABLE ORAL
Status: CANCELLED | OUTPATIENT
Start: 2021-03-10

## 2021-03-10 RX ORDER — GABAPENTIN 300 MG/1
300 CAPSULE ORAL
Status: CANCELLED | OUTPATIENT
Start: 2021-03-10

## 2021-03-12 ENCOUNTER — ANESTHESIA EVENT (OUTPATIENT)
Dept: SURGERY | Facility: CLINIC | Age: 36
DRG: 271 | End: 2021-03-12
Payer: COMMERCIAL

## 2021-03-12 DIAGNOSIS — Z11.59 ENCOUNTER FOR SCREENING FOR OTHER VIRAL DISEASES: ICD-10-CM

## 2021-03-12 LAB
SARS-COV-2 RNA RESP QL NAA+PROBE: NORMAL
SPECIMEN SOURCE: NORMAL

## 2021-03-12 PROCEDURE — U0003 INFECTIOUS AGENT DETECTION BY NUCLEIC ACID (DNA OR RNA); SEVERE ACUTE RESPIRATORY SYNDROME CORONAVIRUS 2 (SARS-COV-2) (CORONAVIRUS DISEASE [COVID-19]), AMPLIFIED PROBE TECHNIQUE, MAKING USE OF HIGH THROUGHPUT TECHNOLOGIES AS DESCRIBED BY CMS-2020-01-R: HCPCS | Performed by: SURGERY

## 2021-03-12 PROCEDURE — U0005 INFEC AGEN DETEC AMPLI PROBE: HCPCS | Performed by: SURGERY

## 2021-03-15 ENCOUNTER — ANESTHESIA (OUTPATIENT)
Dept: SURGERY | Facility: CLINIC | Age: 36
DRG: 271 | End: 2021-03-15
Payer: COMMERCIAL

## 2021-03-15 ENCOUNTER — HOSPITAL ENCOUNTER (INPATIENT)
Facility: CLINIC | Age: 36
LOS: 8 days | Discharge: HOME | DRG: 271 | End: 2021-03-23
Attending: SURGERY | Admitting: SURGERY
Payer: COMMERCIAL

## 2021-03-15 ENCOUNTER — APPOINTMENT (OUTPATIENT)
Dept: GENERAL RADIOLOGY | Facility: CLINIC | Age: 36
DRG: 271 | End: 2021-03-15
Attending: SURGERY
Payer: COMMERCIAL

## 2021-03-15 DIAGNOSIS — I42.5 RESTRICTIVE CARDIOMYOPATHY (H): ICD-10-CM

## 2021-03-15 DIAGNOSIS — Z98.890 HISTORY OF PERICARDIECTOMY: ICD-10-CM

## 2021-03-15 DIAGNOSIS — I07.1 TRICUSPID REGURGITATION: ICD-10-CM

## 2021-03-15 PROBLEM — I31.1 CONSTRICTIVE PERICARDITIS: Status: ACTIVE | Noted: 2021-03-15

## 2021-03-15 LAB
ABO + RH BLD: NORMAL
ABO + RH BLD: NORMAL
ALBUMIN SERPL-MCNC: 3.2 G/DL (ref 3.4–5)
ALP SERPL-CCNC: 60 U/L (ref 40–150)
ALT SERPL W P-5'-P-CCNC: 16 U/L (ref 0–50)
ANGLE RATE OF CLOT GROWTH: 66.9 DEG (ref 59–74)
ANION GAP SERPL CALCULATED.3IONS-SCNC: 6 MMOL/L (ref 3–14)
APTT PPP: 33 SEC (ref 22–37)
AST SERPL W P-5'-P-CCNC: 20 U/L (ref 0–45)
BASE DEFICIT BLDA-SCNC: 0.5 MMOL/L
BASE EXCESS BLDA CALC-SCNC: 0.9 MMOL/L
BASE EXCESS BLDA CALC-SCNC: 3.7 MMOL/L
BASE EXCESS BLDV CALC-SCNC: 1.3 MMOL/L
BASE EXCESS BLDV CALC-SCNC: 1.3 MMOL/L
BASE EXCESS BLDV CALC-SCNC: 1.8 MMOL/L
BILIRUB SERPL-MCNC: 0.6 MG/DL (ref 0.2–1.3)
BLD GP AB SCN SERPL QL: NORMAL
BLD PROD TYP BPU: NORMAL
BLD UNIT ID BPU: 0
BLD UNIT ID BPU: 0
BLOOD BANK CMNT PATIENT-IMP: NORMAL
BLOOD BANK CMNT PATIENT-IMP: NORMAL
BLOOD PRODUCT CODE: NORMAL
BLOOD PRODUCT CODE: NORMAL
BPU ID: NORMAL
BPU ID: NORMAL
BUN SERPL-MCNC: 11 MG/DL (ref 7–30)
CA-I BLD-MCNC: 4.2 MG/DL (ref 4.4–5.2)
CA-I BLD-MCNC: 4.3 MG/DL (ref 4.4–5.2)
CA-I BLD-MCNC: 4.5 MG/DL (ref 4.4–5.2)
CALCIUM SERPL-MCNC: 7.4 MG/DL (ref 8.5–10.1)
CHLORIDE SERPL-SCNC: 105 MMOL/L (ref 94–109)
CI HYPERCOAGULATION INDEX: 0.3 RATIO (ref 0–3)
CLOT LYSIS 30M P MA LENFR BLD TEG: 3 % (ref 0–8)
CLOT STRENGTH BLD TEG: 8.9 KD/SC (ref 5.3–13.2)
CO2 SERPL-SCNC: 29 MMOL/L (ref 20–32)
CREAT SERPL-MCNC: 0.65 MG/DL (ref 0.52–1.04)
CREAT SERPL-MCNC: 0.71 MG/DL (ref 0.52–1.04)
ERYTHROCYTE [DISTWIDTH] IN BLOOD BY AUTOMATED COUNT: 12.9 % (ref 10–15)
GFR SERPL CREATININE-BSD FRML MDRD: >90 ML/MIN/{1.73_M2}
GFR SERPL CREATININE-BSD FRML MDRD: >90 ML/MIN/{1.73_M2}
GLUCOSE BLD-MCNC: 176 MG/DL (ref 70–99)
GLUCOSE BLD-MCNC: 97 MG/DL (ref 70–99)
GLUCOSE BLDC GLUCOMTR-MCNC: 118 MG/DL (ref 70–99)
GLUCOSE BLDC GLUCOMTR-MCNC: 122 MG/DL (ref 70–99)
GLUCOSE BLDC GLUCOMTR-MCNC: 137 MG/DL (ref 70–99)
GLUCOSE BLDC GLUCOMTR-MCNC: 85 MG/DL (ref 70–99)
GLUCOSE SERPL-MCNC: 124 MG/DL (ref 70–99)
GRAM STN SPEC: NORMAL
GRAM STN SPEC: NORMAL
HCG UR QL: NEGATIVE
HCO3 BLD-SCNC: 24 MMOL/L (ref 21–28)
HCO3 BLD-SCNC: 26 MMOL/L (ref 21–28)
HCO3 BLD-SCNC: 27 MMOL/L (ref 21–28)
HCO3 BLDV-SCNC: 28 MMOL/L (ref 21–28)
HCO3 BLDV-SCNC: 29 MMOL/L (ref 21–28)
HCO3 BLDV-SCNC: 29 MMOL/L (ref 21–28)
HCT VFR BLD AUTO: 37.2 % (ref 35–47)
HGB BLD-MCNC: 11.9 G/DL (ref 11.7–15.7)
HGB BLD-MCNC: 12 G/DL (ref 11.7–15.7)
HGB BLD-MCNC: 12.4 G/DL (ref 11.7–15.7)
HGB BLD-MCNC: 12.9 G/DL (ref 11.7–15.7)
INR PPP: 1.49 (ref 0.86–1.14)
K TIME TO SPEC CLOT STRENGTH: 1.6 MIN (ref 1–3)
LACTATE BLD-SCNC: 0.6 MMOL/L (ref 0.7–2)
LACTATE BLD-SCNC: 0.8 MMOL/L (ref 0.7–2)
LACTATE BLD-SCNC: 1.1 MMOL/L (ref 0.7–2)
LACTATE BLD-SCNC: 1.7 MMOL/L (ref 0.7–2)
LY60 LYSIS AT 60 MINUTES: 6.5 % (ref 0–15)
MA MAXIMUM CLOT STRENGTH: 64 MM (ref 55–74)
MAGNESIUM SERPL-MCNC: 1.7 MG/DL (ref 1.6–2.3)
MCH RBC QN AUTO: 30.1 PG (ref 26.5–33)
MCHC RBC AUTO-ENTMCNC: 32 G/DL (ref 31.5–36.5)
MCV RBC AUTO: 94 FL (ref 78–100)
NUM BPU REQUESTED: 4
O2/TOTAL GAS SETTING VFR VENT: 92 %
O2/TOTAL GAS SETTING VFR VENT: 96 %
O2/TOTAL GAS SETTING VFR VENT: ABNORMAL %
OXYHGB MFR BLD: 98 % (ref 92–100)
OXYHGB MFR BLDV: 69 %
OXYHGB MFR BLDV: 71 %
OXYHGB MFR BLDV: 73 %
PCO2 BLD: 33 MM HG (ref 35–45)
PCO2 BLD: 40 MM HG (ref 35–45)
PCO2 BLD: 49 MM HG (ref 35–45)
PCO2 BLDV: 52 MM HG (ref 40–50)
PCO2 BLDV: 56 MM HG (ref 40–50)
PCO2 BLDV: 56 MM HG (ref 40–50)
PH BLD: 7.35 PH (ref 7.35–7.45)
PH BLD: 7.4 PH (ref 7.35–7.45)
PH BLD: 7.52 PH (ref 7.35–7.45)
PH BLDV: 7.32 PH (ref 7.32–7.43)
PH BLDV: 7.32 PH (ref 7.32–7.43)
PH BLDV: 7.34 PH (ref 7.32–7.43)
PHOSPHATE SERPL-MCNC: 3.8 MG/DL (ref 2.5–4.5)
PLATELET # BLD AUTO: 168 10E9/L (ref 150–450)
PO2 BLD: 136 MM HG (ref 80–105)
PO2 BLD: 463 MM HG (ref 80–105)
PO2 BLD: 493 MM HG (ref 80–105)
PO2 BLDV: 39 MM HG (ref 25–47)
PO2 BLDV: 42 MM HG (ref 25–47)
PO2 BLDV: 44 MM HG (ref 25–47)
POTASSIUM BLD-SCNC: 2.8 MMOL/L (ref 3.4–5.3)
POTASSIUM BLD-SCNC: 3 MMOL/L (ref 3.4–5.3)
POTASSIUM SERPL-SCNC: 3.3 MMOL/L (ref 3.4–5.3)
POTASSIUM SERPL-SCNC: 3.4 MMOL/L (ref 3.4–5.3)
POTASSIUM SERPL-SCNC: 4 MMOL/L (ref 3.4–5.3)
PROT SERPL-MCNC: 5.4 G/DL (ref 6.8–8.8)
R TIME UNTIL CLOT FORMS: 6.5 MIN (ref 4–9)
RBC # BLD AUTO: 3.96 10E12/L (ref 3.8–5.2)
SODIUM BLD-SCNC: 137 MMOL/L (ref 133–144)
SODIUM BLD-SCNC: 138 MMOL/L (ref 133–144)
SODIUM SERPL-SCNC: 140 MMOL/L (ref 133–144)
SPECIMEN EXP DATE BLD: NORMAL
SPECIMEN SOURCE: NORMAL
TRANSFUSION STATUS PATIENT QL: NORMAL
WBC # BLD AUTO: 12 10E9/L (ref 4–11)

## 2021-03-15 PROCEDURE — 250N000011 HC RX IP 250 OP 636: Performed by: SURGERY

## 2021-03-15 PROCEDURE — 250N000009 HC RX 250: Performed by: SURGERY

## 2021-03-15 PROCEDURE — 93010 ELECTROCARDIOGRAM REPORT: CPT | Mod: 76 | Performed by: INTERNAL MEDICINE

## 2021-03-15 PROCEDURE — 87102 FUNGUS ISOLATION CULTURE: CPT | Performed by: SURGERY

## 2021-03-15 PROCEDURE — 4A1239Z MONITORING OF CARDIAC OUTPUT, PERCUTANEOUS APPROACH: ICD-10-PCS | Performed by: SURGERY

## 2021-03-15 PROCEDURE — 82947 ASSAY GLUCOSE BLOOD QUANT: CPT

## 2021-03-15 PROCEDURE — 85018 HEMOGLOBIN: CPT | Performed by: ANESTHESIOLOGY

## 2021-03-15 PROCEDURE — 88305 TISSUE EXAM BY PATHOLOGIST: CPT | Mod: 26 | Performed by: PATHOLOGY

## 2021-03-15 PROCEDURE — 87206 SMEAR FLUORESCENT/ACID STAI: CPT | Performed by: SURGERY

## 2021-03-15 PROCEDURE — 82565 ASSAY OF CREATININE: CPT | Performed by: ANESTHESIOLOGY

## 2021-03-15 PROCEDURE — 84295 ASSAY OF SERUM SODIUM: CPT

## 2021-03-15 PROCEDURE — 250N000013 HC RX MED GY IP 250 OP 250 PS 637: Performed by: SURGERY

## 2021-03-15 PROCEDURE — 200N000002 HC R&B ICU UMMC

## 2021-03-15 PROCEDURE — 87015 SPECIMEN INFECT AGNT CONCNTJ: CPT | Performed by: SURGERY

## 2021-03-15 PROCEDURE — 81025 URINE PREGNANCY TEST: CPT | Performed by: ANESTHESIOLOGY

## 2021-03-15 PROCEDURE — 370N000017 HC ANESTHESIA TECHNICAL FEE, PER MIN: Performed by: SURGERY

## 2021-03-15 PROCEDURE — 250N000013 HC RX MED GY IP 250 OP 250 PS 637: Performed by: STUDENT IN AN ORGANIZED HEALTH CARE EDUCATION/TRAINING PROGRAM

## 2021-03-15 PROCEDURE — 999N000045 HC STATISTIC DAILY SWAN MONITORING

## 2021-03-15 PROCEDURE — 258N000003 HC RX IP 258 OP 636: Performed by: SURGERY

## 2021-03-15 PROCEDURE — 02HP32Z INSERTION OF MONITORING DEVICE INTO PULMONARY TRUNK, PERCUTANEOUS APPROACH: ICD-10-PCS | Performed by: SURGERY

## 2021-03-15 PROCEDURE — 410N000003 HC PER-PERFUSION 1ST 30 MIN: Performed by: SURGERY

## 2021-03-15 PROCEDURE — 84132 ASSAY OF SERUM POTASSIUM: CPT

## 2021-03-15 PROCEDURE — 82330 ASSAY OF CALCIUM: CPT

## 2021-03-15 PROCEDURE — 250N000009 HC RX 250: Performed by: STUDENT IN AN ORGANIZED HEALTH CARE EDUCATION/TRAINING PROGRAM

## 2021-03-15 PROCEDURE — 999N000065 XR CHEST PORT 1 VW

## 2021-03-15 PROCEDURE — 93503 INSERT/PLACE HEART CATHETER: CPT

## 2021-03-15 PROCEDURE — 87205 SMEAR GRAM STAIN: CPT | Performed by: SURGERY

## 2021-03-15 PROCEDURE — 71045 X-RAY EXAM CHEST 1 VIEW: CPT | Mod: 26 | Performed by: RADIOLOGY

## 2021-03-15 PROCEDURE — 80053 COMPREHEN METABOLIC PANEL: CPT | Performed by: SURGERY

## 2021-03-15 PROCEDURE — 84100 ASSAY OF PHOSPHORUS: CPT | Performed by: SURGERY

## 2021-03-15 PROCEDURE — 82805 BLOOD GASES W/O2 SATURATION: CPT | Performed by: SURGERY

## 2021-03-15 PROCEDURE — 87070 CULTURE OTHR SPECIMN AEROBIC: CPT | Performed by: SURGERY

## 2021-03-15 PROCEDURE — 84132 ASSAY OF SERUM POTASSIUM: CPT | Performed by: STUDENT IN AN ORGANIZED HEALTH CARE EDUCATION/TRAINING PROGRAM

## 2021-03-15 PROCEDURE — 83605 ASSAY OF LACTIC ACID: CPT | Performed by: SURGERY

## 2021-03-15 PROCEDURE — 258N000003 HC RX IP 258 OP 636: Performed by: STUDENT IN AN ORGANIZED HEALTH CARE EDUCATION/TRAINING PROGRAM

## 2021-03-15 PROCEDURE — 83605 ASSAY OF LACTIC ACID: CPT

## 2021-03-15 PROCEDURE — 250N000011 HC RX IP 250 OP 636: Performed by: STUDENT IN AN ORGANIZED HEALTH CARE EDUCATION/TRAINING PROGRAM

## 2021-03-15 PROCEDURE — 85396 CLOTTING ASSAY WHOLE BLOOD: CPT | Performed by: SURGERY

## 2021-03-15 PROCEDURE — 93005 ELECTROCARDIOGRAM TRACING: CPT

## 2021-03-15 PROCEDURE — 88305 TISSUE EXAM BY PATHOLOGIST: CPT | Mod: TC | Performed by: SURGERY

## 2021-03-15 PROCEDURE — 258N000003 HC RX IP 258 OP 636: Performed by: ANESTHESIOLOGY

## 2021-03-15 PROCEDURE — 272N000001 HC OR GENERAL SUPPLY STERILE: Performed by: SURGERY

## 2021-03-15 PROCEDURE — 999N000155 HC STATISTIC RAPCV CVP MONITORING

## 2021-03-15 PROCEDURE — 999N000015 HC STATISTIC ARTERIAL MONITORING DAILY

## 2021-03-15 PROCEDURE — 84132 ASSAY OF SERUM POTASSIUM: CPT | Performed by: ANESTHESIOLOGY

## 2021-03-15 PROCEDURE — 999N000043 HC STATISTIC CTO2 CONT OXYGEN TECH TIME EA 90 MIN

## 2021-03-15 PROCEDURE — 87176 TISSUE HOMOGENIZATION CULTR: CPT | Performed by: SURGERY

## 2021-03-15 PROCEDURE — 99231 SBSQ HOSP IP/OBS SF/LOW 25: CPT | Mod: GC | Performed by: ANESTHESIOLOGY

## 2021-03-15 PROCEDURE — 999N000141 HC STATISTIC PRE-PROCEDURE NURSING ASSESSMENT: Performed by: SURGERY

## 2021-03-15 PROCEDURE — 82330 ASSAY OF CALCIUM: CPT | Performed by: SURGERY

## 2021-03-15 PROCEDURE — 250N000025 HC SEVOFLURANE, PER MIN: Performed by: SURGERY

## 2021-03-15 PROCEDURE — 85610 PROTHROMBIN TIME: CPT | Performed by: SURGERY

## 2021-03-15 PROCEDURE — 36415 COLL VENOUS BLD VENIPUNCTURE: CPT | Performed by: ANESTHESIOLOGY

## 2021-03-15 PROCEDURE — 87075 CULTR BACTERIA EXCEPT BLOOD: CPT | Performed by: SURGERY

## 2021-03-15 PROCEDURE — 85027 COMPLETE CBC AUTOMATED: CPT | Performed by: SURGERY

## 2021-03-15 PROCEDURE — 83735 ASSAY OF MAGNESIUM: CPT | Performed by: SURGERY

## 2021-03-15 PROCEDURE — 999N001017 HC STATISTIC GLUCOSE BY METER IP

## 2021-03-15 PROCEDURE — 87116 MYCOBACTERIA CULTURE: CPT | Performed by: SURGERY

## 2021-03-15 PROCEDURE — 02BN0ZZ EXCISION OF PERICARDIUM, OPEN APPROACH: ICD-10-PCS | Performed by: SURGERY

## 2021-03-15 PROCEDURE — 360N000077 HC SURGERY LEVEL 4, PER MIN: Performed by: SURGERY

## 2021-03-15 PROCEDURE — 999N000157 HC STATISTIC RCP TIME EA 10 MIN

## 2021-03-15 PROCEDURE — 82803 BLOOD GASES ANY COMBINATION: CPT

## 2021-03-15 PROCEDURE — 4A133B3 MONITORING OF ARTERIAL PRESSURE, PULMONARY, PERCUTANEOUS APPROACH: ICD-10-PCS | Performed by: SURGERY

## 2021-03-15 PROCEDURE — 85730 THROMBOPLASTIN TIME PARTIAL: CPT | Performed by: SURGERY

## 2021-03-15 RX ORDER — SODIUM CHLORIDE, SODIUM LACTATE, POTASSIUM CHLORIDE, CALCIUM CHLORIDE 600; 310; 30; 20 MG/100ML; MG/100ML; MG/100ML; MG/100ML
INJECTION, SOLUTION INTRAVENOUS CONTINUOUS
Status: DISCONTINUED | OUTPATIENT
Start: 2021-03-15 | End: 2021-03-16

## 2021-03-15 RX ORDER — DEXMEDETOMIDINE HYDROCHLORIDE 4 UG/ML
0.2-1.2 INJECTION, SOLUTION INTRAVENOUS CONTINUOUS
Status: DISCONTINUED | OUTPATIENT
Start: 2021-03-15 | End: 2021-03-16

## 2021-03-15 RX ORDER — FUROSEMIDE 10 MG/ML
INJECTION INTRAMUSCULAR; INTRAVENOUS PRN
Status: DISCONTINUED | OUTPATIENT
Start: 2021-03-15 | End: 2021-03-15

## 2021-03-15 RX ORDER — NOREPINEPHRINE BITARTRATE 0.06 MG/ML
0.03-0.4 INJECTION, SOLUTION INTRAVENOUS CONTINUOUS
Status: DISCONTINUED | OUTPATIENT
Start: 2021-03-15 | End: 2021-03-16

## 2021-03-15 RX ORDER — DEXAMETHASONE SODIUM PHOSPHATE 4 MG/ML
INJECTION, SOLUTION INTRA-ARTICULAR; INTRALESIONAL; INTRAMUSCULAR; INTRAVENOUS; SOFT TISSUE PRN
Status: DISCONTINUED | OUTPATIENT
Start: 2021-03-15 | End: 2021-03-15

## 2021-03-15 RX ORDER — DEXTROSE MONOHYDRATE 25 G/50ML
25-50 INJECTION, SOLUTION INTRAVENOUS
Status: DISCONTINUED | OUTPATIENT
Start: 2021-03-15 | End: 2021-03-19

## 2021-03-15 RX ORDER — NALOXONE HYDROCHLORIDE 0.4 MG/ML
0.4 INJECTION, SOLUTION INTRAMUSCULAR; INTRAVENOUS; SUBCUTANEOUS
Status: DISCONTINUED | OUTPATIENT
Start: 2021-03-15 | End: 2021-03-23 | Stop reason: HOSPADM

## 2021-03-15 RX ORDER — FLUMAZENIL 0.1 MG/ML
0.2 INJECTION, SOLUTION INTRAVENOUS
Status: DISCONTINUED | OUTPATIENT
Start: 2021-03-15 | End: 2021-03-16 | Stop reason: CLARIF

## 2021-03-15 RX ORDER — HYDRALAZINE HYDROCHLORIDE 20 MG/ML
10 INJECTION INTRAMUSCULAR; INTRAVENOUS EVERY 30 MIN PRN
Status: DISCONTINUED | OUTPATIENT
Start: 2021-03-15 | End: 2021-03-23 | Stop reason: HOSPADM

## 2021-03-15 RX ORDER — NALOXONE HYDROCHLORIDE 0.4 MG/ML
0.2 INJECTION, SOLUTION INTRAMUSCULAR; INTRAVENOUS; SUBCUTANEOUS
Status: DISCONTINUED | OUTPATIENT
Start: 2021-03-15 | End: 2021-03-23 | Stop reason: HOSPADM

## 2021-03-15 RX ORDER — HYDROXYZINE HYDROCHLORIDE 25 MG/1
25 TABLET, FILM COATED ORAL EVERY 6 HOURS PRN
Status: DISCONTINUED | OUTPATIENT
Start: 2021-03-15 | End: 2021-03-23 | Stop reason: HOSPADM

## 2021-03-15 RX ORDER — POTASSIUM CHLORIDE 29.8 MG/ML
INJECTION INTRAVENOUS PRN
Status: DISCONTINUED | OUTPATIENT
Start: 2021-03-15 | End: 2021-03-15

## 2021-03-15 RX ORDER — NALOXONE HYDROCHLORIDE 0.4 MG/ML
0.4 INJECTION, SOLUTION INTRAMUSCULAR; INTRAVENOUS; SUBCUTANEOUS
Status: DISCONTINUED | OUTPATIENT
Start: 2021-03-15 | End: 2021-03-19

## 2021-03-15 RX ORDER — GABAPENTIN 300 MG/1
300 CAPSULE ORAL
Status: COMPLETED | OUTPATIENT
Start: 2021-03-15 | End: 2021-03-15

## 2021-03-15 RX ORDER — MAGNESIUM SULFATE HEPTAHYDRATE 40 MG/ML
2 INJECTION, SOLUTION INTRAVENOUS ONCE
Status: COMPLETED | OUTPATIENT
Start: 2021-03-15 | End: 2021-03-15

## 2021-03-15 RX ORDER — HEPARIN SODIUM 5000 [USP'U]/.5ML
5000 INJECTION, SOLUTION INTRAVENOUS; SUBCUTANEOUS EVERY 8 HOURS
Status: DISCONTINUED | OUTPATIENT
Start: 2021-03-16 | End: 2021-03-22

## 2021-03-15 RX ORDER — PHENYLEPHRINE HCL IN 0.9% NACL 50MG/250ML
0.5-6 PLASTIC BAG, INJECTION (ML) INTRAVENOUS CONTINUOUS
Status: DISCONTINUED | OUTPATIENT
Start: 2021-03-15 | End: 2021-03-16

## 2021-03-15 RX ORDER — ACETAMINOPHEN 325 MG/1
975 TABLET ORAL EVERY 8 HOURS
Status: COMPLETED | OUTPATIENT
Start: 2021-03-15 | End: 2021-03-18

## 2021-03-15 RX ORDER — ASPIRIN 81 MG/1
81 TABLET, CHEWABLE ORAL
Status: DISCONTINUED | OUTPATIENT
Start: 2021-03-15 | End: 2021-03-16 | Stop reason: CLARIF

## 2021-03-15 RX ORDER — ONDANSETRON 2 MG/ML
4 INJECTION INTRAMUSCULAR; INTRAVENOUS EVERY 6 HOURS PRN
Status: DISCONTINUED | OUTPATIENT
Start: 2021-03-15 | End: 2021-03-23 | Stop reason: HOSPADM

## 2021-03-15 RX ORDER — DROPERIDOL 2.5 MG/ML
0.62 INJECTION, SOLUTION INTRAMUSCULAR; INTRAVENOUS ONCE
Status: COMPLETED | OUTPATIENT
Start: 2021-03-15 | End: 2021-03-15

## 2021-03-15 RX ORDER — AMOXICILLIN 250 MG
1 CAPSULE ORAL 2 TIMES DAILY
Status: DISCONTINUED | OUTPATIENT
Start: 2021-03-15 | End: 2021-03-18

## 2021-03-15 RX ORDER — LIDOCAINE 40 MG/G
CREAM TOPICAL
Status: DISCONTINUED | OUTPATIENT
Start: 2021-03-15 | End: 2021-03-16

## 2021-03-15 RX ORDER — ONDANSETRON 2 MG/ML
INJECTION INTRAMUSCULAR; INTRAVENOUS PRN
Status: DISCONTINUED | OUTPATIENT
Start: 2021-03-15 | End: 2021-03-15

## 2021-03-15 RX ORDER — POTASSIUM CHLORIDE 29.8 MG/ML
20 INJECTION INTRAVENOUS ONCE
Status: COMPLETED | OUTPATIENT
Start: 2021-03-15 | End: 2021-03-15

## 2021-03-15 RX ORDER — NALOXONE HYDROCHLORIDE 0.4 MG/ML
0.2 INJECTION, SOLUTION INTRAMUSCULAR; INTRAVENOUS; SUBCUTANEOUS
Status: DISCONTINUED | OUTPATIENT
Start: 2021-03-15 | End: 2021-03-19

## 2021-03-15 RX ORDER — ONDANSETRON 4 MG/1
4 TABLET, ORALLY DISINTEGRATING ORAL EVERY 6 HOURS PRN
Status: DISCONTINUED | OUTPATIENT
Start: 2021-03-15 | End: 2021-03-23 | Stop reason: HOSPADM

## 2021-03-15 RX ORDER — ACETAMINOPHEN 325 MG/1
975 TABLET ORAL ONCE
Status: COMPLETED | OUTPATIENT
Start: 2021-03-15 | End: 2021-03-15

## 2021-03-15 RX ORDER — ACETAMINOPHEN 325 MG/1
650 TABLET ORAL EVERY 4 HOURS PRN
Status: DISCONTINUED | OUTPATIENT
Start: 2021-03-18 | End: 2021-03-19

## 2021-03-15 RX ORDER — PROPOFOL 10 MG/ML
INJECTION, EMULSION INTRAVENOUS PRN
Status: DISCONTINUED | OUTPATIENT
Start: 2021-03-15 | End: 2021-03-15

## 2021-03-15 RX ORDER — DEXTROSE MONOHYDRATE, SODIUM CHLORIDE, AND POTASSIUM CHLORIDE 50; 1.49; 4.5 G/1000ML; G/1000ML; G/1000ML
INJECTION, SOLUTION INTRAVENOUS CONTINUOUS
Status: DISCONTINUED | OUTPATIENT
Start: 2021-03-15 | End: 2021-03-16

## 2021-03-15 RX ORDER — ASPIRIN 81 MG/1
162 TABLET, CHEWABLE ORAL
Status: DISCONTINUED | OUTPATIENT
Start: 2021-03-15 | End: 2021-03-16 | Stop reason: CLARIF

## 2021-03-15 RX ORDER — FENTANYL CITRATE 50 UG/ML
25-50 INJECTION, SOLUTION INTRAMUSCULAR; INTRAVENOUS
Status: DISCONTINUED | OUTPATIENT
Start: 2021-03-15 | End: 2021-03-16

## 2021-03-15 RX ORDER — NICARDIPINE HYDROCHLORIDE 0.2 MG/ML
2.5-15 INJECTION INTRAVENOUS CONTINUOUS
Status: DISCONTINUED | OUTPATIENT
Start: 2021-03-15 | End: 2021-03-16

## 2021-03-15 RX ORDER — METHOCARBAMOL 750 MG/1
750 TABLET, FILM COATED ORAL 4 TIMES DAILY PRN
Status: DISCONTINUED | OUTPATIENT
Start: 2021-03-15 | End: 2021-03-23 | Stop reason: HOSPADM

## 2021-03-15 RX ORDER — CHLORHEXIDINE GLUCONATE ORAL RINSE 1.2 MG/ML
10 SOLUTION DENTAL ONCE
Status: COMPLETED | OUTPATIENT
Start: 2021-03-15 | End: 2021-03-15

## 2021-03-15 RX ORDER — SODIUM CHLORIDE, SODIUM GLUCONATE, SODIUM ACETATE, POTASSIUM CHLORIDE AND MAGNESIUM CHLORIDE 526; 502; 368; 37; 30 MG/100ML; MG/100ML; MG/100ML; MG/100ML; MG/100ML
INJECTION, SOLUTION INTRAVENOUS CONTINUOUS PRN
Status: DISCONTINUED | OUTPATIENT
Start: 2021-03-15 | End: 2021-03-15

## 2021-03-15 RX ORDER — MUPIROCIN 20 MG/G
0.5 OINTMENT TOPICAL 2 TIMES DAILY
Status: DISCONTINUED | OUTPATIENT
Start: 2021-03-15 | End: 2021-03-16

## 2021-03-15 RX ORDER — OXYCODONE HYDROCHLORIDE 5 MG/1
5-10 TABLET ORAL
Status: DISCONTINUED | OUTPATIENT
Start: 2021-03-15 | End: 2021-03-23 | Stop reason: HOSPADM

## 2021-03-15 RX ORDER — HYDROXYZINE HYDROCHLORIDE 25 MG/1
50 TABLET, FILM COATED ORAL EVERY 6 HOURS PRN
Status: DISCONTINUED | OUTPATIENT
Start: 2021-03-15 | End: 2021-03-23 | Stop reason: HOSPADM

## 2021-03-15 RX ORDER — CEFAZOLIN SODIUM 2 G/100ML
2 INJECTION, SOLUTION INTRAVENOUS
Status: COMPLETED | OUTPATIENT
Start: 2021-03-15 | End: 2021-03-15

## 2021-03-15 RX ORDER — FUROSEMIDE 10 MG/ML
20 INJECTION INTRAMUSCULAR; INTRAVENOUS ONCE
Status: COMPLETED | OUTPATIENT
Start: 2021-03-15 | End: 2021-03-15

## 2021-03-15 RX ORDER — LIDOCAINE HYDROCHLORIDE 20 MG/ML
INJECTION, SOLUTION INFILTRATION; PERINEURAL PRN
Status: DISCONTINUED | OUTPATIENT
Start: 2021-03-15 | End: 2021-03-15

## 2021-03-15 RX ORDER — EPHEDRINE SULFATE 50 MG/ML
INJECTION, SOLUTION INTRAMUSCULAR; INTRAVENOUS; SUBCUTANEOUS PRN
Status: DISCONTINUED | OUTPATIENT
Start: 2021-03-15 | End: 2021-03-15

## 2021-03-15 RX ORDER — LIDOCAINE 40 MG/G
CREAM TOPICAL
Status: DISCONTINUED | OUTPATIENT
Start: 2021-03-15 | End: 2021-03-23 | Stop reason: HOSPADM

## 2021-03-15 RX ORDER — CEFAZOLIN SODIUM 1 G/3ML
1 INJECTION, POWDER, FOR SOLUTION INTRAMUSCULAR; INTRAVENOUS EVERY 8 HOURS
Status: COMPLETED | OUTPATIENT
Start: 2021-03-15 | End: 2021-03-16

## 2021-03-15 RX ORDER — FENTANYL CITRATE 50 UG/ML
INJECTION, SOLUTION INTRAMUSCULAR; INTRAVENOUS PRN
Status: DISCONTINUED | OUTPATIENT
Start: 2021-03-15 | End: 2021-03-15

## 2021-03-15 RX ORDER — FAMOTIDINE 20 MG/1
20 TABLET, FILM COATED ORAL
Status: COMPLETED | OUTPATIENT
Start: 2021-03-15 | End: 2021-03-15

## 2021-03-15 RX ORDER — POLYETHYLENE GLYCOL 3350 17 G/17G
17 POWDER, FOR SOLUTION ORAL DAILY
Status: DISCONTINUED | OUTPATIENT
Start: 2021-03-15 | End: 2021-03-18

## 2021-03-15 RX ORDER — PANTOPRAZOLE SODIUM 40 MG/1
40 TABLET, DELAYED RELEASE ORAL DAILY
Status: DISCONTINUED | OUTPATIENT
Start: 2021-03-15 | End: 2021-03-23 | Stop reason: HOSPADM

## 2021-03-15 RX ORDER — NICOTINE POLACRILEX 4 MG
15-30 LOZENGE BUCCAL
Status: DISCONTINUED | OUTPATIENT
Start: 2021-03-15 | End: 2021-03-19

## 2021-03-15 RX ORDER — CEFAZOLIN SODIUM 1 G/3ML
1 INJECTION, POWDER, FOR SOLUTION INTRAMUSCULAR; INTRAVENOUS SEE ADMIN INSTRUCTIONS
Status: DISCONTINUED | OUTPATIENT
Start: 2021-03-15 | End: 2021-03-16

## 2021-03-15 RX ADMIN — HYDROMORPHONE HYDROCHLORIDE 0.5 MG: 1 INJECTION, SOLUTION INTRAMUSCULAR; INTRAVENOUS; SUBCUTANEOUS at 12:53

## 2021-03-15 RX ADMIN — FENTANYL CITRATE 50 MCG: 50 INJECTION, SOLUTION INTRAMUSCULAR; INTRAVENOUS at 12:50

## 2021-03-15 RX ADMIN — PHENYLEPHRINE HYDROCHLORIDE 50 MCG: 10 INJECTION INTRAVENOUS at 10:06

## 2021-03-15 RX ADMIN — PHENYLEPHRINE HYDROCHLORIDE 100 MCG: 10 INJECTION INTRAVENOUS at 10:22

## 2021-03-15 RX ADMIN — PANTOPRAZOLE SODIUM 40 MG: 40 TABLET, DELAYED RELEASE ORAL at 14:44

## 2021-03-15 RX ADMIN — LIDOCAINE HYDROCHLORIDE 100 MG: 20 INJECTION, SOLUTION INFILTRATION; PERINEURAL at 08:00

## 2021-03-15 RX ADMIN — SODIUM CHLORIDE, POTASSIUM CHLORIDE, SODIUM LACTATE AND CALCIUM CHLORIDE: 600; 310; 30; 20 INJECTION, SOLUTION INTRAVENOUS at 07:54

## 2021-03-15 RX ADMIN — ONDANSETRON 4 MG: 2 INJECTION INTRAMUSCULAR; INTRAVENOUS at 13:54

## 2021-03-15 RX ADMIN — FUROSEMIDE 10 MG: 10 INJECTION, SOLUTION INTRAVENOUS at 12:16

## 2021-03-15 RX ADMIN — PROPOFOL 70 MG: 10 INJECTION, EMULSION INTRAVENOUS at 08:00

## 2021-03-15 RX ADMIN — GABAPENTIN 300 MG: 300 CAPSULE ORAL at 06:56

## 2021-03-15 RX ADMIN — ACETAMINOPHEN 975 MG: 325 TABLET, FILM COATED ORAL at 06:56

## 2021-03-15 RX ADMIN — LIDOCAINE HYDROCHLORIDE 200 MG: 20 INJECTION, SOLUTION INFILTRATION; PERINEURAL at 11:10

## 2021-03-15 RX ADMIN — SODIUM CHLORIDE, SODIUM GLUCONATE, SODIUM ACETATE, POTASSIUM CHLORIDE AND MAGNESIUM CHLORIDE: 526; 502; 368; 37; 30 INJECTION, SOLUTION INTRAVENOUS at 07:54

## 2021-03-15 RX ADMIN — OXYCODONE HYDROCHLORIDE 5 MG: 5 TABLET ORAL at 15:21

## 2021-03-15 RX ADMIN — POTASSIUM CHLORIDE 20 MEQ: 29.8 INJECTION, SOLUTION INTRAVENOUS at 09:28

## 2021-03-15 RX ADMIN — ROCURONIUM BROMIDE 50 MG: 10 INJECTION INTRAVENOUS at 08:01

## 2021-03-15 RX ADMIN — FENTANYL CITRATE 250 MCG: 50 INJECTION, SOLUTION INTRAMUSCULAR; INTRAVENOUS at 08:00

## 2021-03-15 RX ADMIN — ROCURONIUM BROMIDE 50 MG: 10 INJECTION INTRAVENOUS at 09:03

## 2021-03-15 RX ADMIN — ROCURONIUM BROMIDE 20 MG: 10 INJECTION INTRAVENOUS at 09:53

## 2021-03-15 RX ADMIN — FENTANYL CITRATE 50 MCG: 50 INJECTION, SOLUTION INTRAMUSCULAR; INTRAVENOUS at 09:55

## 2021-03-15 RX ADMIN — POTASSIUM CHLORIDE 20 MEQ: 29.8 INJECTION, SOLUTION INTRAVENOUS at 15:19

## 2021-03-15 RX ADMIN — MIDAZOLAM 1 MG: 1 INJECTION INTRAMUSCULAR; INTRAVENOUS at 13:00

## 2021-03-15 RX ADMIN — Medication 1 UNITS: at 10:17

## 2021-03-15 RX ADMIN — Medication 2 G: at 08:46

## 2021-03-15 RX ADMIN — HYDROXYZINE HYDROCHLORIDE 25 MG: 25 TABLET ORAL at 18:43

## 2021-03-15 RX ADMIN — LIDOCAINE HYDROCHLORIDE 200 MG: 20 INJECTION, SOLUTION INFILTRATION; PERINEURAL at 08:57

## 2021-03-15 RX ADMIN — DEXAMETHASONE SODIUM PHOSPHATE 6 MG: 4 INJECTION, SOLUTION INTRA-ARTICULAR; INTRALESIONAL; INTRAMUSCULAR; INTRAVENOUS; SOFT TISSUE at 08:25

## 2021-03-15 RX ADMIN — POLYETHYLENE GLYCOL 3350 17 G: 17 POWDER, FOR SOLUTION ORAL at 14:44

## 2021-03-15 RX ADMIN — CEFAZOLIN 1 G: 330 INJECTION, POWDER, FOR SOLUTION INTRAMUSCULAR; INTRAVENOUS at 18:16

## 2021-03-15 RX ADMIN — Medication 10 MG: at 08:26

## 2021-03-15 RX ADMIN — MUPIROCIN 0.5 G: 20 OINTMENT TOPICAL at 21:13

## 2021-03-15 RX ADMIN — FUROSEMIDE 20 MG: 10 INJECTION, SOLUTION INTRAVENOUS at 14:44

## 2021-03-15 RX ADMIN — Medication 7 ML/HR: at 11:15

## 2021-03-15 RX ADMIN — PHENYLEPHRINE HYDROCHLORIDE 50 MCG: 10 INJECTION INTRAVENOUS at 09:46

## 2021-03-15 RX ADMIN — PHENYLEPHRINE HYDROCHLORIDE 100 MCG: 10 INJECTION INTRAVENOUS at 11:54

## 2021-03-15 RX ADMIN — SENNOSIDES AND DOCUSATE SODIUM 1 TABLET: 8.6; 5 TABLET ORAL at 21:00

## 2021-03-15 RX ADMIN — PHENYLEPHRINE HYDROCHLORIDE 100 MCG: 10 INJECTION INTRAVENOUS at 08:06

## 2021-03-15 RX ADMIN — PHENYLEPHRINE HYDROCHLORIDE 200 MCG: 10 INJECTION INTRAVENOUS at 08:10

## 2021-03-15 RX ADMIN — FAMOTIDINE 20 MG: 20 TABLET ORAL at 06:56

## 2021-03-15 RX ADMIN — FENTANYL CITRATE 100 MCG: 50 INJECTION, SOLUTION INTRAMUSCULAR; INTRAVENOUS at 09:03

## 2021-03-15 RX ADMIN — MAGNESIUM SULFATE HEPTAHYDRATE 2 G: 40 INJECTION, SOLUTION INTRAVENOUS at 15:22

## 2021-03-15 RX ADMIN — PHENYLEPHRINE HYDROCHLORIDE 50 MCG: 10 INJECTION INTRAVENOUS at 10:04

## 2021-03-15 RX ADMIN — DROPERIDOL 0.62 MG: 2.5 INJECTION, SOLUTION INTRAMUSCULAR; INTRAVENOUS at 15:14

## 2021-03-15 RX ADMIN — HYDROMORPHONE HYDROCHLORIDE 0.5 MG: 1 INJECTION, SOLUTION INTRAMUSCULAR; INTRAVENOUS; SUBCUTANEOUS at 11:46

## 2021-03-15 RX ADMIN — FENTANYL CITRATE 50 MCG: 50 INJECTION, SOLUTION INTRAMUSCULAR; INTRAVENOUS at 13:51

## 2021-03-15 RX ADMIN — PHENYLEPHRINE HYDROCHLORIDE 100 MCG: 10 INJECTION INTRAVENOUS at 11:21

## 2021-03-15 RX ADMIN — MIDAZOLAM 1 MG: 1 INJECTION INTRAMUSCULAR; INTRAVENOUS at 07:54

## 2021-03-15 RX ADMIN — FUROSEMIDE 10 MG: 10 INJECTION, SOLUTION INTRAVENOUS at 11:10

## 2021-03-15 RX ADMIN — CHLORHEXIDINE GLUCONATE 0.12% ORAL RINSE 10 ML: 1.2 LIQUID ORAL at 06:56

## 2021-03-15 RX ADMIN — CALCIUM GLUCONATE 1 G: 98 INJECTION, SOLUTION INTRAVENOUS at 16:32

## 2021-03-15 RX ADMIN — POTASSIUM CHLORIDE, DEXTROSE MONOHYDRATE AND SODIUM CHLORIDE 30 ML: 150; 5; 450 INJECTION, SOLUTION INTRAVENOUS at 15:13

## 2021-03-15 RX ADMIN — SUGAMMADEX 200 MG: 100 INJECTION, SOLUTION INTRAVENOUS at 12:19

## 2021-03-15 RX ADMIN — CEFAZOLIN 1 G: 1 INJECTION, POWDER, FOR SOLUTION INTRAMUSCULAR; INTRAVENOUS at 10:46

## 2021-03-15 RX ADMIN — OXYCODONE HYDROCHLORIDE 10 MG: 5 TABLET ORAL at 21:00

## 2021-03-15 RX ADMIN — FENTANYL CITRATE 50 MCG: 50 INJECTION, SOLUTION INTRAMUSCULAR; INTRAVENOUS at 16:41

## 2021-03-15 RX ADMIN — FENTANYL CITRATE 50 MCG: 50 INJECTION, SOLUTION INTRAMUSCULAR; INTRAVENOUS at 13:00

## 2021-03-15 RX ADMIN — ONDANSETRON 4 MG: 2 INJECTION INTRAMUSCULAR; INTRAVENOUS at 21:07

## 2021-03-15 RX ADMIN — PHENYLEPHRINE HYDROCHLORIDE 100 MCG: 10 INJECTION INTRAVENOUS at 08:26

## 2021-03-15 RX ADMIN — FENTANYL CITRATE 50 MCG: 50 INJECTION, SOLUTION INTRAMUSCULAR; INTRAVENOUS at 13:41

## 2021-03-15 RX ADMIN — EPINEPHRINE 0.03 MCG/KG/MIN: 1 INJECTION PARENTERAL at 10:04

## 2021-03-15 RX ADMIN — MIDAZOLAM 1 MG: 1 INJECTION INTRAMUSCULAR; INTRAVENOUS at 07:06

## 2021-03-15 RX ADMIN — PHENYLEPHRINE HYDROCHLORIDE 100 MCG: 10 INJECTION INTRAVENOUS at 08:20

## 2021-03-15 RX ADMIN — POTASSIUM CHLORIDE 20 MEQ: 29.8 INJECTION, SOLUTION INTRAVENOUS at 12:05

## 2021-03-15 RX ADMIN — ONDANSETRON 4 MG: 2 INJECTION INTRAMUSCULAR; INTRAVENOUS at 12:50

## 2021-03-15 RX ADMIN — ACETAMINOPHEN 975 MG: 325 TABLET, FILM COATED ORAL at 14:44

## 2021-03-15 ASSESSMENT — ACTIVITIES OF DAILY LIVING (ADL)
ADLS_ACUITY_SCORE: 16
ADLS_ACUITY_SCORE: 16

## 2021-03-15 ASSESSMENT — MIFFLIN-ST. JEOR: SCORE: 1330.88

## 2021-03-15 NOTE — PLAN OF CARE
Major Shift Events: pt arrived from OR @ 1300, s/p median sternotomy, complete pericardectomy, and MIKE. Skin assessment completed by Pippa CASTELLON and Bob KLINE. Alert and oriented x4,  neuro q2hr, on 2L NC, SR HR 60-80's, katerine q4hr, MAP >65, K+, mg and Ca replaced, chest tubes x4 to suction, hannah in place with good urine output, 20 mg IV lasix given, Zofran and droperidol given for nausea. PRN fentanyl and oxycodone given for pain.    Plan: continue to monitor and notify MD with changes.  For vital signs and complete assessments, please see documentation flowsheets.   ?

## 2021-03-15 NOTE — LETTER
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]  Admit Date/Time: 3/15/2021  5:40 AM  Discharge Date: 3/23/21  Payor Source: Payor: [employer] / Plan: [employer] FULLY INSURED / Product Type: HMO /       Discharge Plan: home    Discharge Needs Assessment:  Needs      Most Recent Value   Equipment Currently Used at Home  none        Follow-up plan:    Future Appointments   Date Time Provider Department Center   3/18/2021  7:00 PM Ashley Stauffer PT [facility]       Any outstanding tests or procedures:        Referrals     Future Labs/Procedures    CARDIAC REHAB REFERRAL     Process Instructions:    Advance to Wellness and Exercise for Life (WEL) Program or to another maintenance exercise program upon completion of phase 2 cardiac rehab.    Weight mgt program is only offered at [facility].    *This therapy referral will be filtered to a centralized scheduling office at [facility] and the patient will receive a call to schedule an appointment at a Counselor location most convenient for them. *        Comments:    If you have not heard from the scheduling office within 2 business days, please call [phone] for all locations, with the exception of [place], please call [phone] and [place], please call [phone].    Please be aware that coverage of these services is subject to the terms and limitations of your health insurance plan.  Call member services at your health plan with any benefit or coverage questions.              Key Recommendations:  See AVS    Celena Nelson RN    AVS/Discharge Summary is the source of truth; this is a  helpful guide for improved communication of patient story

## 2021-03-15 NOTE — ANESTHESIA CARE TRANSFER NOTE
Patient: Sissy Donaldson    Procedure(s):  Median Sternotomy, Complete Pericardectomy, Cardiopulmonary Bypass Pump on Standby, Transesophageal Echocardiogram by Anesthesia.    Diagnosis: History of pericardiectomy [Z98.890]  Diagnosis Additional Information: No value filed.    Anesthesia Type:   General     Note:    Oropharynx: oropharynx clear of all foreign objects and nasal gatric tube in place  Level of Consciousness: drowsy  Oxygen Supplementation: nasal cannula    Independent Airway: airway patency satisfactory and stable  Dentition: dentition unchanged  Vital Signs Stable: post-procedure vital signs reviewed and stable  Report to RN Given: handoff report given  Patient transferred to: ICU  Comments: Patient transferred to ICU in stable condition, breathing spontaneously on NC, VSS.  Report given to RN.  ICU Handoff: Call for PAUSE to initiate/utilize ICU HANDOFF, Identified Patient, Identified Responsible Provider, Reviewed the Pertinent Medical History, Discussed Surgical Course, Reviewed Intra-OP Anesthesia Management and Issues during Anesthesia, Set Expectations for Post Procedure Period and Allowed Opportunity for Questions and Acknowledgement of Understanding      Vitals: (Last set prior to Anesthesia Care Transfer)  CRNA VITALS  3/15/2021 1233 - 3/15/2021 1311      3/15/2021             EKG:  Sinus rhythm        Electronically Signed By: MCKENNA Claire CRNA  March 15, 2021  1:11 PM

## 2021-03-15 NOTE — ANESTHESIA PROCEDURE NOTES
Arterial Line Procedure Note    Staff -   Anesthesiologist:  Gianni Escobedo MD  Performed By: anesthesiologist  Location: In OR After Induction  Procedure Start/Stop Times:     patient identified, IV checked, site marked, risks and benefits discussed, informed consent, monitors and equipment checked, pre-op evaluation and at physician/surgeon's request      Correct Patient: Yes      Correct Position: Yes      Correct Site: Yes      Correct Procedure: Yes      Correct Laterality:  Yes    Site Marked:  Yes  Line Placement:     Procedure:  Arterial Line    Insertion Site:  Radial    Insertion laterality:  Left    Skin Prep: Chloraprep      Patient Prep: patient draped, mask, sterile gloves, hat and hand hygiene      Local skin infiltration:  None    Ultrasound Guided?: Yes      Artery evaluated via ultrasound confirming patency.   Using realtime imaging, the artery was punctured and the needle was observed entering the artery.      A permanent image is NOT entered into the patient's record.      Catheter size:  20 gauge, 12 cm    Dressing:  Tegaderm    Complications:  None obvious    Arterial waveform: Yes      IBP within 10% of NIBP: Yes

## 2021-03-15 NOTE — ANESTHESIA PROCEDURE NOTES
Central Line Procedure Note    Staff -   Anesthesiologist:  Gianni Escobedo MD  Performed By: anesthesiologist  Location: In OR after induction  Procedure Start/Stop Times:     patient identified, IV checked, site marked, risks and benefits discussed, informed consent, monitors and equipment checked, pre-op evaluation and at physician/surgeon's request      Correct Patient: Yes      Correct Position: Yes      Correct Site: Yes      Correct Procedure: Yes      Correct Laterality:  Yes    Site Marked:  Yes  Line Placement:     Procedure:  Central Line    Insertion laterality:  Right    Insertion site:  Internal Jugular    Position:  Supine    Sterility preparation included the following: hand hygiene performed prior to central venous catheter insertion, maximum sterile barriers were used: cap, mask, sterile gown, sterile gloves, and large sterile sheet, antiseptic used during central venous catheter insertion and skin prep agent completely dried prior to procedure  Medical reason for not performing maximal sterile barrier technique:  No         Injection Technique:  Ultrasound guided    Sterile Ultrasound Technique:  Sterile probe cover and Sterile gel    Vein evaluated via U/S for patency/adequacy of catheter insertion and is adequate.  Using realtime U/S imaging the vein was punctured, and needle was observed entering vein on U/S      A permanent image is NOT entered into the patient's record.      Local skin infiltration:  None    Catheter size:  9 Fr, 10 cm, Introducer    Catheter length at skin (cm):  15    Cath secured with: suture      Dressing:  Tegaderm and Biopatch    Complications:  None obvious    Blood aspirated all lumens: Yes      All Lumens Flushed: Yes      Tip termination: right atrium      Verification method:  Placement to be verified post-op{

## 2021-03-15 NOTE — ANESTHESIA PROCEDURE NOTES
MIKE Probe Insertion Note:    Staff -   Anesthesiologist:  Gianni Escobedo MD  Performed By: anesthesiologist    Probe Status PRE Insertion: NO obvious damage  Probe type:  Adult 3D    Bite block used:   Yes  Insertion Technique: Easy, no oropharyngeal manipulation  Insertion complications: None obvious    Billing Report:MIKE report by Anesthesiologist (See Separate Report note)    Probe Status POST Removal: NO obvious damage

## 2021-03-15 NOTE — ANESTHESIA PREPROCEDURE EVALUATION
Anesthesia Pre-Procedure Evaluation    Patient: Sissy Donaldson   MRN: [id number] : 1985        Preoperative Diagnosis: History of pericardiectomy [Z98.890]   Procedure : Procedure(s):  pericardectomy     Past Medical History:   Diagnosis Date     SLE (systemic lupus erythematosus related syndrome) (H)       Past Surgical History:   Procedure Laterality Date     CV CORONARY ANGIOGRAM N/A 2021    Procedure: CV CORONARY ANGIOGRAM;  Surgeon: Justin Ortega MD;  Location: [facility]     CV LEFT HEART CATH N/A 2021    Procedure: CV LEFT HEART CATH;  Surgeon: Justin Ortega MD;  [facility]: [facility]     CV RIGHT HEART CATH MEASUREMENTS RECORDED N/A 2021    Procedure: CV RIGHT HEART CATH;  Surgeon: Justin Ortega MD;  Location: [facility]     wisdom teeth        Allergies   Allergen Reactions     Sulfamethoxazole-Trimethoprim Rash     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         ROS/MED HX  ENT/Pulmonary:       Neurologic:       Cardiovascular:     (+) -----CHF etiology: restrictive pericarditis     METS/Exercise Tolerance:     Hematologic:       Musculoskeletal:       GI/Hepatic:       Renal/Genitourinary:       Endo:       Psychiatric/Substance Use:       Infectious Disease:       Malignancy:       Other:            Physical Exam    Airway  airway exam normal           Respiratory Devices and Support         Dental  no notable dental history         Cardiovascular   cardiovascular  exam normal          Pulmonary   pulmonary exam normal                OUTSIDE LABS:  CBC:   Lab Results   Component Value Date    WBC 4.9 03/02/2021    WBC 3.8 (L) 01/12/2021    HGB 12.3 03/02/2021    HGB 12.6 01/12/2021    HCT 38.7 03/02/2021    HCT 39.4 01/12/2021     03/02/2021     01/12/2021     BMP:   Lab Results   Component Value Date     03/02/2021     01/12/2021    POTASSIUM 4.1 03/02/2021    POTASSIUM 3.8 01/12/2021    CHLORIDE 103 03/02/2021    CHLORIDE 106 01/12/2021    CO2 32 03/02/2021    CO2 29 01/12/2021    BUN 12 03/02/2021    BUN 10 01/12/2021    CR 0.75 03/02/2021    CR 0.83 01/12/2021    GLC 86 03/02/2021    GLC 87 01/12/2021     COAGS:   Lab Results   Component Value Date    PTT 38 (H) 03/02/2021    INR 1.32 (H) 03/02/2021     POC:   Lab Results   Component Value Date    BGM 85 03/15/2021    HCG Negative 03/15/2021     HEPATIC:   Lab Results   Component Value Date    ALBUMIN 4.3 03/02/2021    PROTTOTAL 7.5 03/02/2021    ALT 22 03/02/2021    AST 22 03/02/2021    ALKPHOS 86 03/02/2021    BILITOTAL 0.7 03/02/2021     OTHER:   Lab Results   Component Value Date    BARTOLO 9.0 03/02/2021       Anesthesia Plan    ASA Status:  3      Anesthesia Type: General.     - Airway: ETT   Induction: Intravenous.   Maintenance: Inhalation.   Techniques and Equipment:     - Lines/Monitors: 2nd IV, Arterial Line, Central Line, PAC     Consents    Anesthesia Plan(s) and associated risks, benefits, and realistic alternatives discussed. Questions answered and patient/representative(s) expressed understanding.     - Discussed with:  Patient      - Extended Intubation/Ventilatory Support Discussed: Yes.      - Patient is DNR/DNI Status: No    Use of blood products discussed: Yes.     - Discussed with: Patient.     - Consented: consented to blood products     Postoperative Care    Pain management: Peripheral nerve block (Continuous), IV analgesics.   PONV prophylaxis: Ondansetron (or other 5HT-3),  Dexamethasone or Solumedrol     Comments:                Gianni Escobedo MD

## 2021-03-15 NOTE — ANESTHESIA PROCEDURE NOTES
Airway   Date/Time: 3/15/2021 8:04 AM   Patient location during procedure: OR  Staff -   CRNA: Satnam Huerta APRN CRNA  Performed By: CRNA    Consent for Airway   Urgency: elective    Indications and Patient Condition  Indications for airway management: mabel-procedural  Induction type:intravenousMask difficulty assessment: 2 - vent by mask + OA or adjuvant +/- NMBA    Final Airway Details  Final airway type: endotracheal airway  Successful airway:ETT - single  Endotracheal Airway Details   ETT size (mm): 7.0  Successful intubation technique: direct laryngoscopy  Grade View of Cords: 1  Adjucts: stylet  Measured from: lips  Secured at (cm): 22  Secured with: pink tape    Post intubation assessment   Placement verified by: capnometry, equal breath sounds and chest rise   Number of attempts at approach: 1  Secured with:pink tape  Ease of procedure: easy  Dentition: Intact and Unchanged

## 2021-03-15 NOTE — H&P
CV ICU H&P  3/15/2021      CO-MORBIDITIES:   Patient Active Problem List   Diagnosis     Hydrosalpinx     Chronic diastolic congestive heart failure (H)     SANTIAGO (generalized anxiety disorder)     History of ITP     Lack of immunity to hepatitis B virus demonstrated by serologic test     Other ascites     Pericardial effusion     Pericarditis     Recurrent major depressive disorder in partial remission (H)     Systemic lupus erythematosus (H)     Restrictive cardiomyopathy (H)     Status post coronary angiogram     History of pericardiectomy       ASSESSMENT: Sissy Donaldson is a 35yoF with a 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 Jang on 3/15/21.    PLAN:  Neuro/ pain/ sedation:  Acute post-operative pain  Generalized Anxiety Disorder  - Monitor neurological status. Notify the MD for any acute changes in exam.  - Fentanyl 25-50 mcg q2 hrs PRN  - Oxycodone 5-10 mg q3 hrs PRN   - Scheduled APAP 975 mg q8 hrs   - Restart PTA sertraline 100 mg every day  - Bilateral erector spinae catheters, RAPS team following, recs appreciated     Pulmonary care:   - Titrate FiO2 for SpO2 >92%  - NC at 4LPM  AB.32/56/44/29    Cardiovascular:    Hx constrictive pericarditis  - Monitor hemodynamic status.   - MAP goal > 65  - Plan for TTE in 3 days per Dr. Jang    GI /Nutrition:  PONV   - Advance Diet as Tolerated: Low Saturated Fat, Na <2400mg   - Zofran 4mg q6 hrs PRN  - Droperidol 0.625 mg IV once  - Bowel regimen with senna BID, miralax daily    Fluids/ Electrolytes/ Renal:   - Soria for strict I&Os  - Hold PTA spironolactone  - Hold PTA torsemide    Endocrine:    Stress hyperglycemia  - Insulin gtt    ID/ Antibiotics:  - Perioperative antibiotics with ancef  - Pericardial tissue Cx NGTD    Heme:     Hx lupus  Acute blood loss anemia  - Hgb goal >7  - Hold PTA plaquenil 200 mg every day    Prophylaxis:    - SCDs  - Bowel  regimen  - PPI    Lines/ tubes/ drains:  - CVC  - PIV x2  - Arterial line  - Soria  - Mediastinal tube x2  - Pleural tube x2    Disposition:  - CV ICU     Silverio Stark, MS4  *[id number]    This patient was seen and discussed with CV ICU attending Dr. Epps.    I, Laure BEJARANO, have read this medical student's note and reviewed this case with him. I agree with this note.     ====================================    HPI:   35yoF with a history of lupus with recurrent pericarditis and pericardial effusions and constrictive pericarditis now s/p pericardiectomy with Dr. Jang on 3/15/21.    Intraoperative course notable for EBL of 700mL. She received 240mL cellsaver, 1100 ml of crystalloid. UOP was 625 ml after a total of 40 mg IV lasix. Her potassium was replaced intraoperatively with a total of 40 mEq. Of note pre-operative MIKE showed trace TR, post-op MIKE showed severe TR.     PAST MEDICAL HISTORY:   Past Medical History:   Diagnosis Date     SLE (systemic lupus erythematosus related syndrome) (H)        PAST SURGICAL HISTORY:   Past Surgical History:   Procedure Laterality Date     CV CORONARY ANGIOGRAM N/A 1/12/2021    Procedure: CV CORONARY ANGIOGRAM;  Surgeon: Justin Ortega MD;  Location:  [facility]ART CARDIAC CATH LAB     CV LEFT HEART CATH N/A 1/12/2021    Procedure: CV LEFT HEART CATH;  Surgeon: Justin Ortega MD;  [facility]     CV RIGHT HEART CATH MEASUREMENTS RECORDED N/A 1/12/2021    Procedure: CV RIGHT HEART CATH;  Surgeon: Justin Ortega MD;  [facility]     wisdom teeth         FAMILY HISTORY:   Family History   Problem Relation Age of Onset     Breast Cancer Paternal Grandmother      Heart Disease Maternal Grandfather        SOCIAL HISTORY:   Social History     Tobacco Use     Smoking status: Never Smoker     Smokeless tobacco: Never Used   Substance Use Topics     Alcohol use: Yes     Alcohol/week: 0.0 standard drinks     Comment:  rarely        OBJECTIVE:   1. VITAL SIGNS:      Temp: (P) 97  F (36.1  C) Temp src: (P) Pulmonary Artery BP: 103/66 Pulse: 70   Resp: (P) 16 SpO2: 95 % O2 Device: None (Room air) Oxygen Delivery: 4 LPM    2. INTAKE/ OUTPUT:     Intake/Output Summary (Last 24 hours) at 3/15/2021 1600  Last data filed at 3/15/2021 1600  Gross per 24 hour   Intake 1657 ml   Output 2470 ml   Net -813 ml       3. PHYSICAL EXAMINATION:   General: NAD  Neuro: Alert, fully directable   Resp: Breathing non-labored  CV: RRR  Abdomen: Soft, Non-distended  Extremities: warm and well perfused    4. INVESTIGATIONS:   Arterial Blood Gases   Recent Labs   Lab 03/15/21  1334 03/15/21  1157 03/15/21  0843   PH 7.35 7.40 7.52*   PCO2 49* 40 33*   PO2 136* 463* 493*   HCO3 27 24 26   O2PER 4L  4L 92.0 96.0     Complete Blood Count     Lab Results   Component Value Date    WBC 12.0 03/15/2021     Lab Results   Component Value Date    RBC 3.96 03/15/2021     Lab Results   Component Value Date    HGB 11.9 03/15/2021     Lab Results   Component Value Date    HCT 37.2 03/15/2021     No components found for: MCT  Lab Results   Component Value Date    MCV 94 03/15/2021     Lab Results   Component Value Date    MCH 30.1 03/15/2021     Lab Results   Component Value Date    MCHC 32.0 03/15/2021     Lab Results   Component Value Date    RDW 12.9 03/15/2021     Lab Results   Component Value Date     03/15/2021       5. RADIOLOGY:   Exam: XR CHEST PORT 1 VW, 3/15/2021 2:14 PM    Comparison: Chest x-ray 3/2/2021   History: Post Op CVTS Surgery   Findings:  A single portable AP semiupright view of the chest is performed.  Postsurgical changes of complete pericardectomy with numerous  mediastinal surgical clips. Median sternotomy wires are intact. Right  internal jugular [place]-Ezra catheter is in the right main pulmonary  artery. Bilateral chest tubes and mediastinal drains are in place.     Trachea is midline. Mediastinum is within normal  limits.  Cardiopulmonary silhouette is within normal limits. Streaky  retrocardiac opacities. Possible trace pleural effusions. There is no  pneumothorax or pleural effusion. The upper abdomen is unremarkable.                                                                   Impression:   1. [place]-Ezra catheter tip is in the central right main pulmonary  artery.  2. Bibasilar atelectasis. Possible trace pleural effusions.  3. No appreciable pneumothorax.     I have personally reviewed the examination and initial interpretation  and I agree with the findings.     ROYAL ELLSWORTH, DO    =========================================

## 2021-03-15 NOTE — ANESTHESIA POSTPROCEDURE EVALUATION
Patient: Sissy Donaldson    Procedure(s):  Median Sternotomy, Complete Pericardectomy, Cardiopulmonary Bypass Pump on Standby, Transesophageal Echocardiogram by Anesthesia.    Diagnosis:History of pericardiectomy [Z98.890]  Diagnosis Additional Information: No value filed.    Anesthesia Type:  General    Note:  Disposition: ICU            ICU Sign Out: Anesthesiologist/ICU physician sign out WAS performed   Postop Pain Control: Uneventful            Sign Out: Well controlled pain   PONV: No   Neuro/Psych: Uneventful            Sign Out: Acceptable/Baseline neuro status   Airway/Respiratory: Uneventful            Sign Out: Acceptable/Baseline resp. status   CV/Hemodynamics: Uneventful            Sign Out: Acceptable CV status   Other NRE: NONE   DID A NON-ROUTINE EVENT OCCUR? No         Last vitals:  Vitals:    03/15/21 0700 03/15/21 0705 03/15/21 0710   BP: 102/76 100/71 98/70   Pulse: 60 67 67   Resp: 14 12 16   Temp:      SpO2: 100% 100% 100%       Last vitals prior to Anesthesia Care Transfer:  CRNA VITALS  3/15/2021 1233 - 3/15/2021 1333      3/15/2021             Pulse:  74    ART BP:  114/75    ART Mean:  91    SpO2:  98 %    Resp Rate (observed):  18    EKG:  Sinus rhythm          Electronically Signed By: Gianni Escobedo MD  March 15, 2021  1:40 PM

## 2021-03-15 NOTE — ANESTHESIA PROCEDURE NOTES
Pre-Procedure   Staff -   Anesthesiologist:  Viola Kirkpatrick MD  Resident/Fellow: Barbie Crabtree MD  Performed By: resident  Location: pre-op  Procedure Start/Stop Times: 3/15/2021 7:00 AM and 3/15/2021 7:20 AM  Pre-Anesthestic Checklist: patient identified, IV checked, site marked, risks and benefits discussed, informed consent, monitors and equipment checked, pre-op evaluation, at physician/surgeon's request and post-op pain management  Timeout:  Correct Patient: Yes   Correct Procedure: Yes   Correct Site: Yes   Correct Position: Yes   Correct Laterality: N/A   Site Marked: N/A    Procedure Documentation  Procedure: Erector spinae  Laterality: bilateral  Patient Position:prone  Patient Prep/Sterile Barriers: sterile gloves, mask, Chloraprep, patient draped  Local skin infiltrated with 3 mL of 1% lidocaine.   Insertion Site: T5-6.  Needle type: Touhy needle  Needle Gauge: 17.   Needle Length (Inches) 3.13    Catheter threaded easily.    Ultrasound guided, Ultrasound used to identify targeted nerve, plexus, vascular marker, or fascial plane and place a needle adjacent to it in real-time, Ultrasound was used to visualize the spread of anesthetic in close proximity to the above referenced structure  The nerve(s) appeared anatomically normal, There were no apparent abnormal pathologic findings    Assessment/Narrative      The placement was negative for: blood aspirated, painful injection and site bleeding  Paresthesias: No.  Bolus given via. No blood aspirated via catheter.   Secured via Tegaderm and Dermabond.   Insertion/Infusion Method: Continuous Infusion  Complications: none  Comments:  Discussed risks of nerve block, including nerve injury, bleeding, infection, incomplete analgesia.  Informed consent was obtained.   Patient tolerated well. Incremental aspiration every 5 mL. No paresthesia, no heme. Needle tip visualized throughout with appropriate spread of saline in fascial plane through both Touhy and  catheters. .   Block was placed at the surgeon's request for post operative pain control.

## 2021-03-15 NOTE — ANESTHESIA PROCEDURE NOTES
Perioperative MIKE Report  Anesthesia Information  MIKE probe placed and report generated by: : Gianni Escobedo MD Wilkey, Andrew, MD          Preanesthesia Checklist:  Patient identified, IV assessed, risks and benefits discussed, monitors and equipment assessed, procedure being performed at surgeon's request and anesthesia consent obtained.  General Procedure Information  Modalities:  2D, PW Doppler and Color flow mapping    Pericardectomy  Echocardiographic and Doppler Measurements  Right Ventricle:  Cavity size normal.   Hypertrophy not present.   Thrombus not present.    Global function normal.     Left Ventricle:  Cavity size normal.   Hypertrophy not present.   Thrombus not present.   Global Function normal.       Ventricular Regional Function:  1- Basal Anteroseptal:  normal  2- Basal Anterior:  normal  3- Basal Anterolateral:  normal  4- Basal Inferolateral:  normal  5- Basal Inferior:  normal  6- Basal Inferoseptal:  normal  7- Mid Anteroseptal:  normal  8- Mid Anterior:  normal  9- Mid Anterolateral:  normal  10- Mid Inferolateral:  normal  11- Mid Inferior:  normal  12- Mid Inferoseptal:  normal  13- Apical Anterior:  normal  14- Apical Lateral:  normal  15- Apical Inferior:  normal  16- Apical Septal:  normal  17- [place]:  normal    Valves  Aortic Valve: Annulus normal.  Stenosis not present.  Regurgitation absent.  Leaflets normal.  Leaflet motions normal.    Mitral Valve: Annulus normal.  Stenosis not present.  Regurgitation absent.  Leaflets normal.  Leaflet motions normal.    Tricuspid Valve: Annulus normal.  Regurgitation +1.                Other Findings:   Pericardium:  thickened.  . .  .  .  .  Post Intervention Findings  . .   Regional wall motion:   Surgeon(s) notified of all postintervention findings:  Yes  .  .  .   .  .  .  .  .  .  .    TR now severe with apparent posterior leaflet involvement.    Echocardiogram Comments

## 2021-03-15 NOTE — PROGRESS NOTES
Perioperative Pain Service Cross Cover Note  Bilateral paravertebral catheters bolused at 5:45 PM with PF bupivacaine 0.25%, 5 mL on each side (10 mL total). Negative aspirate before, without complication, no symptoms of local anesthetic toxicity (LAST).  RN in room and aware to watch HR and BP for 30 min, and assess for any untoward effects to local anesthetic following injection and contact anesthesia for any questions or concerns.    Trena Mendoza MD  Anesthesiology Resident, PGY3  Trena Mendoza MD on 3/15/2021 at 6:03 PM

## 2021-03-15 NOTE — OR NURSING
GAYLA block performed without complications.  VSS.  Pt tolerated well.  Will continue to monitor.

## 2021-03-15 NOTE — ANESTHESIA PROCEDURE NOTES
PA Catheter Insertion Note     Staff -   Anesthesiologist:  Gianni Escobedo MD  Performed By: anesthesiologist    Introducer: Introducer placed as part of procedure (SEE separate note)   Skin prep:  Chloraprep Cap, Full body drape, hand hygiene, Mask, Sterile gloves and Sterile gown    PA Catheter type:  CCO    Distance catheter advanced:  40 cm.    Appropriate RA, RV, PA  waveforms?: Yes    Dressing:  Biopatch and Tegaderm    Complications:  None apparent

## 2021-03-15 NOTE — BRIEF OP NOTE
[facility]    Brief Operative Note    Pre-operative diagnosis: History of pericardiectomy [Z98.890]  Post-operative diagnosis Constrictive pericardiitis    Procedure: Procedure(s):  Median Sternotomy, Complete Pericardectomy, Cardiopulmonary Bypass Pump on Standby, Transesophageal Echocardiogram by Anesthesia.  Surgeon: Surgeon(s) and Role:     * Kirit Jang MD - Primary     * Ac James MD - Assisting     * Nirav Avila MD - Resident - Assisting  Anesthesia: Combined General with Block   Estimated blood loss: 700 ml  Drains: 28 Fr pleural x2, 36 fr mediastinal x2  Specimens:   ID Type Source Tests Collected by Time Destination   1 : Pericardium Tissue Pericardium AFB CULTURE NON BLOOD, ANAEROBIC BACTERIAL CULTURE, FUNGUS CULTURE, GRAM STAIN, TISSUE CULTURE AEROBIC BACTERIAL Kirit Jang MD 3/15/2021 10:28 AM    A : Pericardium Tissue Pericardium SURGICAL PATHOLOGY EXAM Kirit Jang MD 3/15/2021 10:35 AM    B : Pericardium Tissue Pericardium SURGICAL PATHOLOGY EXAM Kirit Jang MD 3/15/2021 10:52 AM      Findings:   None.  Complications: None.  Implants: * No implants in log *

## 2021-03-16 ENCOUNTER — APPOINTMENT (OUTPATIENT)
Dept: GENERAL RADIOLOGY | Facility: CLINIC | Age: 36
DRG: 271 | End: 2021-03-16
Attending: SURGERY
Payer: COMMERCIAL

## 2021-03-16 ENCOUNTER — APPOINTMENT (OUTPATIENT)
Dept: OCCUPATIONAL THERAPY | Facility: CLINIC | Age: 36
DRG: 271 | End: 2021-03-16
Attending: SURGERY
Payer: COMMERCIAL

## 2021-03-16 LAB
ALBUMIN SERPL-MCNC: 3.4 G/DL (ref 3.4–5)
ALP SERPL-CCNC: 59 U/L (ref 40–150)
ALT SERPL W P-5'-P-CCNC: 16 U/L (ref 0–50)
ANION GAP SERPL CALCULATED.3IONS-SCNC: 7 MMOL/L (ref 3–14)
AST SERPL W P-5'-P-CCNC: 26 U/L (ref 0–45)
BASE EXCESS BLDV CALC-SCNC: 1.7 MMOL/L
BASE EXCESS BLDV CALC-SCNC: 2 MMOL/L
BASE EXCESS BLDV CALC-SCNC: 2.1 MMOL/L
BILIRUB SERPL-MCNC: 0.9 MG/DL (ref 0.2–1.3)
BUN SERPL-MCNC: 12 MG/DL (ref 7–30)
CA-I BLD-MCNC: 4.7 MG/DL (ref 4.4–5.2)
CALCIUM SERPL-MCNC: 8.6 MG/DL (ref 8.5–10.1)
CHLORIDE SERPL-SCNC: 101 MMOL/L (ref 94–109)
CO2 SERPL-SCNC: 27 MMOL/L (ref 20–32)
COPATH REPORT: NORMAL
CREAT SERPL-MCNC: 0.66 MG/DL (ref 0.52–1.04)
ERYTHROCYTE [DISTWIDTH] IN BLOOD BY AUTOMATED COUNT: 13 % (ref 10–15)
GFR SERPL CREATININE-BSD FRML MDRD: >90 ML/MIN/{1.73_M2}
GLUCOSE BLDC GLUCOMTR-MCNC: 102 MG/DL (ref 70–99)
GLUCOSE BLDC GLUCOMTR-MCNC: 109 MG/DL (ref 70–99)
GLUCOSE BLDC GLUCOMTR-MCNC: 125 MG/DL (ref 70–99)
GLUCOSE BLDC GLUCOMTR-MCNC: 127 MG/DL (ref 70–99)
GLUCOSE BLDC GLUCOMTR-MCNC: 98 MG/DL (ref 70–99)
GLUCOSE SERPL-MCNC: 117 MG/DL (ref 70–99)
HCO3 BLDV-SCNC: 26 MMOL/L (ref 21–28)
HCO3 BLDV-SCNC: 29 MMOL/L (ref 21–28)
HCO3 BLDV-SCNC: 29 MMOL/L (ref 21–28)
HCT VFR BLD AUTO: 40 % (ref 35–47)
HGB BLD-MCNC: 12.7 G/DL (ref 11.7–15.7)
INTERPRETATION ECG - MUSE: NORMAL
MAGNESIUM SERPL-MCNC: 2.4 MG/DL (ref 1.6–2.3)
MCH RBC QN AUTO: 29.3 PG (ref 26.5–33)
MCHC RBC AUTO-ENTMCNC: 31.8 G/DL (ref 31.5–36.5)
MCV RBC AUTO: 92 FL (ref 78–100)
O2/TOTAL GAS SETTING VFR VENT: 21 %
O2/TOTAL GAS SETTING VFR VENT: ABNORMAL %
O2/TOTAL GAS SETTING VFR VENT: ABNORMAL %
OXYHGB MFR BLDV: 63 %
OXYHGB MFR BLDV: 64 %
OXYHGB MFR BLDV: 66 %
PCO2 BLDV: 37 MM HG (ref 40–50)
PCO2 BLDV: 51 MM HG (ref 40–50)
PCO2 BLDV: 53 MM HG (ref 40–50)
PH BLDV: 7.34 PH (ref 7.32–7.43)
PH BLDV: 7.36 PH (ref 7.32–7.43)
PH BLDV: 7.45 PH (ref 7.32–7.43)
PHOSPHATE SERPL-MCNC: 4.2 MG/DL (ref 2.5–4.5)
PLATELET # BLD AUTO: 183 10E9/L (ref 150–450)
PO2 BLDV: 32 MM HG (ref 25–47)
PO2 BLDV: 35 MM HG (ref 25–47)
PO2 BLDV: 38 MM HG (ref 25–47)
POTASSIUM SERPL-SCNC: 4.4 MMOL/L (ref 3.4–5.3)
PROT SERPL-MCNC: 6 G/DL (ref 6.8–8.8)
RBC # BLD AUTO: 4.34 10E12/L (ref 3.8–5.2)
SODIUM SERPL-SCNC: 136 MMOL/L (ref 133–144)
WBC # BLD AUTO: 13.2 10E9/L (ref 4–11)

## 2021-03-16 PROCEDURE — 71045 X-RAY EXAM CHEST 1 VIEW: CPT | Mod: 26 | Performed by: RADIOLOGY

## 2021-03-16 PROCEDURE — 80053 COMPREHEN METABOLIC PANEL: CPT | Performed by: SURGERY

## 2021-03-16 PROCEDURE — 250N000013 HC RX MED GY IP 250 OP 250 PS 637: Performed by: NURSE PRACTITIONER

## 2021-03-16 PROCEDURE — 71045 X-RAY EXAM CHEST 1 VIEW: CPT

## 2021-03-16 PROCEDURE — 250N000009 HC RX 250: Performed by: PHYSICIAN ASSISTANT

## 2021-03-16 PROCEDURE — 85027 COMPLETE CBC AUTOMATED: CPT | Performed by: SURGERY

## 2021-03-16 PROCEDURE — 250N000011 HC RX IP 250 OP 636: Performed by: STUDENT IN AN ORGANIZED HEALTH CARE EDUCATION/TRAINING PROGRAM

## 2021-03-16 PROCEDURE — 93005 ELECTROCARDIOGRAM TRACING: CPT

## 2021-03-16 PROCEDURE — 250N000011 HC RX IP 250 OP 636: Performed by: NURSE PRACTITIONER

## 2021-03-16 PROCEDURE — 82805 BLOOD GASES W/O2 SATURATION: CPT | Performed by: SURGERY

## 2021-03-16 PROCEDURE — 250N000009 HC RX 250: Performed by: SURGERY

## 2021-03-16 PROCEDURE — 82330 ASSAY OF CALCIUM: CPT | Performed by: SURGERY

## 2021-03-16 PROCEDURE — 999N000045 HC STATISTIC DAILY SWAN MONITORING

## 2021-03-16 PROCEDURE — 999N000155 HC STATISTIC RAPCV CVP MONITORING

## 2021-03-16 PROCEDURE — 97165 OT EVAL LOW COMPLEX 30 MIN: CPT | Mod: GO

## 2021-03-16 PROCEDURE — 84100 ASSAY OF PHOSPHORUS: CPT | Performed by: SURGERY

## 2021-03-16 PROCEDURE — 250N000011 HC RX IP 250 OP 636: Performed by: SURGERY

## 2021-03-16 PROCEDURE — 250N000013 HC RX MED GY IP 250 OP 250 PS 637: Performed by: SURGERY

## 2021-03-16 PROCEDURE — 97535 SELF CARE MNGMENT TRAINING: CPT | Mod: GO

## 2021-03-16 PROCEDURE — 83735 ASSAY OF MAGNESIUM: CPT | Performed by: SURGERY

## 2021-03-16 PROCEDURE — 120N000003 HC R&B IMCU UMMC

## 2021-03-16 PROCEDURE — 999N000015 HC STATISTIC ARTERIAL MONITORING DAILY

## 2021-03-16 PROCEDURE — 999N001017 HC STATISTIC GLUCOSE BY METER IP

## 2021-03-16 RX ORDER — DEXTROSE MONOHYDRATE 25 G/50ML
25-50 INJECTION, SOLUTION INTRAVENOUS
Status: DISCONTINUED | OUTPATIENT
Start: 2021-03-16 | End: 2021-03-17

## 2021-03-16 RX ORDER — DROPERIDOL 2.5 MG/ML
0.62 INJECTION, SOLUTION INTRAMUSCULAR; INTRAVENOUS ONCE
Status: COMPLETED | OUTPATIENT
Start: 2021-03-16 | End: 2021-03-16

## 2021-03-16 RX ORDER — BUPIVACAINE HYDROCHLORIDE 5 MG/ML
5 INJECTION, SOLUTION EPIDURAL; INTRACAUDAL ONCE
Status: COMPLETED | OUTPATIENT
Start: 2021-03-16 | End: 2021-03-16

## 2021-03-16 RX ORDER — HYDROMORPHONE HYDROCHLORIDE 1 MG/ML
.3-.5 INJECTION, SOLUTION INTRAMUSCULAR; INTRAVENOUS; SUBCUTANEOUS
Status: DISCONTINUED | OUTPATIENT
Start: 2021-03-16 | End: 2021-03-23 | Stop reason: HOSPADM

## 2021-03-16 RX ORDER — SPIRONOLACTONE 25 MG
12.5 TABLET ORAL DAILY
Status: DISCONTINUED | OUTPATIENT
Start: 2021-03-16 | End: 2021-03-19

## 2021-03-16 RX ORDER — TORSEMIDE 20 MG/1
20 TABLET ORAL DAILY
Status: DISCONTINUED | OUTPATIENT
Start: 2021-03-16 | End: 2021-03-19

## 2021-03-16 RX ORDER — NICOTINE POLACRILEX 4 MG
15-30 LOZENGE BUCCAL
Status: DISCONTINUED | OUTPATIENT
Start: 2021-03-16 | End: 2021-03-17

## 2021-03-16 RX ORDER — LORAZEPAM 1 MG/1
1 TABLET ORAL EVERY 6 HOURS PRN
Status: DISCONTINUED | OUTPATIENT
Start: 2021-03-16 | End: 2021-03-23 | Stop reason: HOSPADM

## 2021-03-16 RX ORDER — FUROSEMIDE 10 MG/ML
60 INJECTION INTRAMUSCULAR; INTRAVENOUS ONCE
Status: COMPLETED | OUTPATIENT
Start: 2021-03-16 | End: 2021-03-16

## 2021-03-16 RX ORDER — HYDROXYCHLOROQUINE SULFATE 200 MG/1
200 TABLET, FILM COATED ORAL DAILY
Status: DISCONTINUED | OUTPATIENT
Start: 2021-03-16 | End: 2021-03-23 | Stop reason: HOSPADM

## 2021-03-16 RX ADMIN — OXYCODONE HYDROCHLORIDE 10 MG: 5 TABLET ORAL at 22:34

## 2021-03-16 RX ADMIN — ONDANSETRON 4 MG: 2 INJECTION INTRAMUSCULAR; INTRAVENOUS at 08:12

## 2021-03-16 RX ADMIN — OXYCODONE HYDROCHLORIDE 10 MG: 5 TABLET ORAL at 04:13

## 2021-03-16 RX ADMIN — METHOCARBAMOL 750 MG: 750 TABLET, FILM COATED ORAL at 08:12

## 2021-03-16 RX ADMIN — SERTRALINE HYDROCHLORIDE 100 MG: 50 TABLET ORAL at 07:44

## 2021-03-16 RX ADMIN — BUPIVACAINE HYDROCHLORIDE 25 MG: 5 INJECTION, SOLUTION EPIDURAL; INTRACAUDAL at 10:39

## 2021-03-16 RX ADMIN — SENNOSIDES AND DOCUSATE SODIUM 1 TABLET: 8.6; 5 TABLET ORAL at 21:20

## 2021-03-16 RX ADMIN — ACETAMINOPHEN 975 MG: 325 TABLET, FILM COATED ORAL at 00:14

## 2021-03-16 RX ADMIN — ACETAMINOPHEN 975 MG: 325 TABLET, FILM COATED ORAL at 22:34

## 2021-03-16 RX ADMIN — OXYCODONE HYDROCHLORIDE 10 MG: 5 TABLET ORAL at 00:14

## 2021-03-16 RX ADMIN — LORAZEPAM 1 MG: 1 TABLET ORAL at 12:31

## 2021-03-16 RX ADMIN — ACETAMINOPHEN 975 MG: 325 TABLET, FILM COATED ORAL at 14:02

## 2021-03-16 RX ADMIN — OXYCODONE HYDROCHLORIDE 10 MG: 5 TABLET ORAL at 12:32

## 2021-03-16 RX ADMIN — PANTOPRAZOLE SODIUM 40 MG: 40 TABLET, DELAYED RELEASE ORAL at 07:44

## 2021-03-16 RX ADMIN — CEFAZOLIN 1 G: 330 INJECTION, POWDER, FOR SOLUTION INTRAMUSCULAR; INTRAVENOUS at 10:35

## 2021-03-16 RX ADMIN — POLYETHYLENE GLYCOL 3350 17 G: 17 POWDER, FOR SOLUTION ORAL at 07:44

## 2021-03-16 RX ADMIN — TORSEMIDE 20 MG: 20 TABLET ORAL at 12:52

## 2021-03-16 RX ADMIN — Medication 12.5 MG: at 12:53

## 2021-03-16 RX ADMIN — DROPERIDOL 0.62 MG: 2.5 INJECTION, SOLUTION INTRAMUSCULAR; INTRAVENOUS at 09:51

## 2021-03-16 RX ADMIN — HEPARIN SODIUM 5000 UNITS: 5000 INJECTION, SOLUTION INTRAVENOUS; SUBCUTANEOUS at 12:32

## 2021-03-16 RX ADMIN — METHOCARBAMOL 750 MG: 750 TABLET, FILM COATED ORAL at 12:31

## 2021-03-16 RX ADMIN — CEFAZOLIN 1 G: 330 INJECTION, POWDER, FOR SOLUTION INTRAMUSCULAR; INTRAVENOUS at 03:31

## 2021-03-16 RX ADMIN — Medication: at 15:52

## 2021-03-16 RX ADMIN — OXYCODONE HYDROCHLORIDE 10 MG: 5 TABLET ORAL at 19:34

## 2021-03-16 RX ADMIN — OXYCODONE HYDROCHLORIDE 5 MG: 5 TABLET ORAL at 15:52

## 2021-03-16 RX ADMIN — HEPARIN SODIUM 5000 UNITS: 5000 INJECTION, SOLUTION INTRAVENOUS; SUBCUTANEOUS at 21:20

## 2021-03-16 RX ADMIN — SENNOSIDES AND DOCUSATE SODIUM 1 TABLET: 8.6; 5 TABLET ORAL at 07:45

## 2021-03-16 RX ADMIN — HYDROXYCHLOROQUINE SULFATE 200 MG: 200 TABLET, FILM COATED ORAL at 12:31

## 2021-03-16 RX ADMIN — HYDROMORPHONE HYDROCHLORIDE 0.3 MG: 1 INJECTION, SOLUTION INTRAMUSCULAR; INTRAVENOUS; SUBCUTANEOUS at 21:20

## 2021-03-16 RX ADMIN — ACETAMINOPHEN 975 MG: 325 TABLET, FILM COATED ORAL at 07:44

## 2021-03-16 RX ADMIN — OXYCODONE HYDROCHLORIDE 10 MG: 5 TABLET ORAL at 07:45

## 2021-03-16 RX ADMIN — FUROSEMIDE 60 MG: 10 INJECTION, SOLUTION INTRAVENOUS at 08:13

## 2021-03-16 ASSESSMENT — ACTIVITIES OF DAILY LIVING (ADL)
ADLS_ACUITY_SCORE: 16
ADLS_ACUITY_SCORE: 14
ADLS_ACUITY_SCORE: 16
ADLS_ACUITY_SCORE: 16
ADLS_ACUITY_SCORE: 14

## 2021-03-16 ASSESSMENT — MIFFLIN-ST. JEOR: SCORE: 1335.88

## 2021-03-16 NOTE — PROGRESS NOTES
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 Jang on 3/15/21.   and placement of bilateral T5-6 erector spinae (ES) catheters for analgesia    Subjective and Interval History:  This morning, RN noticed that right ES catheter ropivacaine cartridge was not functional-unable to program/prime the catheter, pharmacy believes it's related to the cartridge, new cartridge ordered. Seen at 1020.  Patient reports 7/10 at rest and  pain control with current nerve block infusion and analgesics (see below).  Ambulating with stand by assistance,  Denies  weakness, paresthesias, circumoral numbness, metallic taste or tinnitus. Patient has Soria in place, chest tubes in place.  Tolerating ordered diet, has nausea.    Antithrombotic/Thrombolytic Therapy ordered:  heparin ANTICOAGULANT injection 5,000 Units SC, Q8H       Analgesic Medications:   Medications related to Pain Management (From now, onward)    Start     Dose/Rate Route Frequency Ordered Stop    03/18/21 0000  acetaminophen (TYLENOL) tablet 650 mg      650 mg Oral or Feeding Tube EVERY 4 HOURS PRN 03/15/21 1325      03/15/21 2000  senna-docusate (SENOKOT-S/PERICOLACE) 8.6-50 MG per tablet 1 tablet      1 tablet Oral or Feeding Tube 2 TIMES DAILY 03/15/21 1325      03/15/21 1720  hydrOXYzine (ATARAX) tablet 25 mg      25 mg Oral EVERY 6 HOURS PRN 03/15/21 1721      03/15/21 1720  hydrOXYzine (ATARAX) tablet 50 mg      50 mg Oral EVERY 6 HOURS PRN 03/15/21 1721      03/15/21 1500  acetaminophen (TYLENOL) tablet 975 mg      975 mg Oral or Feeding Tube EVERY 8 HOURS 03/15/21 1325 03/18/21 1459    03/15/21 1330  polyethylene glycol (MIRALAX) Packet 17 g      17 g Oral or Feeding Tube DAILY 03/15/21 1325      03/15/21 1324  oxyCODONE (ROXICODONE) tablet 5-10 mg       5-10 mg Oral or Feeding Tube EVERY 3 HOURS PRN 03/15/21 1325      03/15/21 1324  methocarbamol (ROBAXIN) tablet 750 mg      750 mg Oral or Feeding Tube 4 TIMES DAILY PRN 03/15/21 1325      03/15/21 1324  lidocaine 1 % 0.1-1 mL      0.1-1 mL Other EVERY 1 HOUR PRN 03/15/21 1325      03/15/21 1324  lidocaine (LMX4) cream       Topical EVERY 1 HOUR PRN 03/15/21 1325      03/15/21 0800  ropivacaine 0.2% in NS perineural infusion simple       Perineural Continuous Nerve Block 03/15/21 0746      03/15/21 0800  ropivacaine 0.2% in NS perineural infusion simple       Perineural Continuous Nerve Block 03/15/21 0746      03/15/21 0630  dexmedetomidine (PRECEDEX) 400 mcg in 0.9% sodium chloride 100 mL      0.2-1.2 mcg/kg/hr × 65.8 kg  3.3-19.7 mL/hr  Intravenous CONTINUOUS 03/15/21 0607      03/15/21 0607  lidocaine 1 % 0.1-1 mL      0.1-1 mL Other EVERY 1 HOUR PRN 03/15/21 0609      03/15/21 0607  lidocaine (LMX4) cream       Topical EVERY 1 HOUR PRN 03/15/21 0609      03/15/21 0607  fentaNYL (PF) (SUBLIMAZE) injection 25-50 mcg      25-50 mcg Intravenous EVERY 2 MIN PRN 03/15/21 0607      03/15/21 0607  midazolam (VERSED) injection 1-2 mg      1-2 mg Intravenous EVERY 4 MIN PRN 03/15/21 0607      03/15/21 0607  lidocaine (LMX4) cream       Topical EVERY 1 HOUR PRN 03/15/21 0607      03/15/21 0607  lidocaine 1 % 0.1-1 mL      0.1-1 mL Other EVERY 1 HOUR PRN 03/15/21 0607      03/15/21 0607  aspirin (ASA) chewable tablet 162 mg      162 mg Oral PRE-OP/PRE-PROCEDURE 03/15/21 0607      03/15/21 0607  aspirin (ASA) chewable tablet 81 mg      81 mg Oral PRE-OP/PRE-PROCEDURE 03/15/21 0607      03/15/21 0607  lidocaine 2 % 200 mg, magnesium sulfate 1 g, mannitol 25 % 12.5 g, sodium bicarbonate 20 mEq CARDIOPLEGIA (Hot Shot) solution       PERFUSION ON CALL TO O.R. 03/15/21 0607             Objective:  Lab results  Recent Labs   Lab Test 03/16/21  0341   WBC 13.2*   RBC 4.34   HGB 12.7   HCT 40.0   MCV 92   MCH 29.3   MCHC 31.8    RDW 13.0          Lab Results   Component Value Date    INR 1.49 03/15/2021    INR 1.32 03/02/2021    INR 1.26 01/12/2021       Vitals:    Temp:  [97  F (36.1  C)-99  F (37.2  C)] 99  F (37.2  C)  Pulse:  [60-75] 62  Resp:  [8-20] 8  BP: ()/(58-66) 92/58  MAP:  [76 mmHg-212 mmHg] 102 mmHg  Arterial Line BP: ()/() 111/100  SpO2:  [95 %-100 %] 100 %    Exam:   GEN: alert and no distress  NEURO/MSK: moving UE and LE independently. Sitting up in chair  Strength LE 5/5  and overall symmetric  SKIN: bilateral erector spinae (ES) catheter sites with dressing c/d/i, no tenderness, erythema, heme, edema      Assessment and Plan:     Patient is receiving moderate analgesia with current multimodal therapy including 0.2% ropivacaine PIB (programmed intermittent bolus) at 7 mL/hr bolus per catheter.  Motor function intact and is meeting activity goals.  No evidence of adverse side effects related to local anesthetic.     Bolus administered at 1040    MEDICATION: PF bupivacaine  0.25%, total bolus 10mL, via 5ml each catheter.    PROCEDURE: Clinician bolus administered via left and right nerve block catheter, without complication, negative aspirate before and between each mL.  No symptoms of local anesthetic systemic toxicity (LAST). Remained with and assessed patient for 10 min post-injection. BP, P and MAP stable    POST-PROCEDURE: Bedside RN aware of need to continue BP, P and MAP monitoring Q 10 min for an additional 20 min. Contact RAPS (jobcode ID [id number]) if any of the following: patient experiencing any untoward effects, SBP< 90, P < 50 or > 120, MAP < 60     - patient can be evaluated to receive local anesthetic bolus Q 12 hr PRN pain not controlled with continuous infusion.  Bedside nurse must page RAPS to request bolus    Catheter Day #1 bilateral T5-6 erector spinae (ES) catheters/PIB infusion:    Continue 0.2% ropivacaine 7 mL bolus/hr per catheter, total infusion 14mL/hr, max catheter of 7  days        Patient can be evaluated to receive local anesthetic bolus Q 12 hr PRN, bedside nurse must page RAPS #: [id number] to request bolus    Antithrombotic/thrombolytic therapy:     heparin ANTICOAGULANT injection 5,000 Units SC, Q8H as ordered.     Please contact RAPS, jobcode ID: [id number] prior to any medication changes        RAPS will continue to follow and adjust as needed    - discussed plan with attending anesthesiologist    Ivana Rosales PA-C  [facility]ional Anesthesia Pain Service  3/16/2021 8:28 AM    RAPS Contact Info (24 hour job code pager is the last 4 digits) For in-house use only:   Job code ID: [id number]   [place] 0599  Peds [phone]  [employer] phone: dial * * * [phone], enter jobcode ID, then enter call-back number.    Text: Use [employer] on the Intranet <Paging/Directory> tab and enter Jobcode ID.   If no call back at any time, contact the hospital  and ask for RAPS attending or backup

## 2021-03-16 NOTE — PLAN OF CARE
PT 4E: Will HOLD at this time. OT to follow and will initiate PT if/when appropriate. May not have acute PT needs this admission.

## 2021-03-16 NOTE — PROGRESS NOTES
[facility] ICU PROGRESS NOTE  March 16, 2021    CO-MORBIDITIES:   History of pericardiectomy  History of pericardiectomy  Restrictive cardiomyopathy (H)    ASSESSMENT: Sissy Donaldson is a 35yoF PMH significant for recurrdent pericardial effusions, TR, and constrictive pericarditis now s/p percardiectomy with Dr. Jang on 3/15/21.    TODAY'S PROGRESS:   - Restart PTA spironolactone  - Restart PTA torsemide  - Discontinue IV maintenance fluids  - Ativan 1 mg q6 hrs PRN    PLAN:  Neuro/ pain/ sedation:  Acute post-operative pain  Generalized Anxiety Disorder  - Monitor neurological status. Notify the MD for any acute changes in exam.  - Fentanyl 25-50 mcg q2 hrs PRN  - Oxycodone 5-10 mg q3 hrs PRN   - Scheduled APAP 975 mg q8 hrs   - Atarax 25 mg q6 hrs PRN  - Ativan 1 mg q6 hrs PRN   - PTA sertraline 100 mg every day  - Bilateral erector spinae catheters, RAPS team following, recs appreciated     Pulmonary care:   - SpO2 > 92 on room air    Cardiovascular:    - Monitor hemodynamic status.   - MAP goal > 65  - Plan for TTE in 3 days post-op per Dr. Jang    GI /Nutrition:  Nausea   - Advance Diet as Tolerated: Low Saturated Fat, Na <2400mg   - Zofran 4mg q6 hrs PRN  - Bowel regimen with senna BID, miralax daily    Fluids/ Electrolytes/ Renal:   - Discontinue maintenance fluids  - Restart PTA spironolactone  - Restart PTA torsemide    Endocrine:    - Sliding scale insulin until 24 hrs post-op  - Insulin ggt discontinued    ID/ Antibiotics:  - Perioperative antibiotics with ancef  - Pericardial tissue Cx NGTD    Heme:     - Perioperative antibiotics with ancef  - Restart PTA plaquenil     Prophylaxis:    - Mechanical prophylaxis for DVT  - SubQ heparin  - PPI    Lines/ tubes/ drains:    - PIV x2      - Mediastinal tube x2  - Pleural tube x2    Disposition:  - Transfer to the floor    Patient seen, findings and plan discussed with CV ICU staff, Dr. Epps.    Silverio Stark, MS4  *[phone]    I, Laure BEJARANO, saw and  discussed this patient with Silverio and I agree with what is written in this note.   ====================================    SUBJECTIVE:   Complains of 8/10 chest pain with inspiration, denies belly pain.    OBJECTIVE:   1. VITAL SIGNS:   Temp:  [97  F (36.1  C)-99  F (37.2  C)] 99  F (37.2  C)  Pulse:  [60-75] 70  Resp:  [8-20] 15  BP: (92)/(58) 92/58  MAP:  [76 mmHg-212 mmHg] 81 mmHg  Arterial Line BP: ()/() 88/75  SpO2:  [95 %-100 %] 97 %  Resp: 17      2. INTAKE/ OUTPUT:   I/O last 3 completed shifts:  In: 3598 [P.O.:1320; I.V.:1738; Other:240]  Out: 3687 [Urine:2117; Blood:700; Chest Tube:870]    3. PHYSICAL EXAMINATION:   General: NAD  Neuro: A&Ox3  Resp: Breathing non-labored  CV: RRR  Abdomen: Soft, Non-distended, Non-tender  Extremities: warm and well perfused    4. INVESTIGATIONS:   Arterial Blood Gases   Recent Labs   Lab 03/15/21  1334 03/15/21  1157 03/15/21  0843   PH 7.35 7.40 7.52*   PCO2 49* 40 33*   PO2 136* 463* 493*   HCO3 27 24 26     Complete Blood Count   Recent Labs   Lab 03/16/21  0341 03/15/21  1334 03/15/21  1157 03/15/21  0843   WBC 13.2* 12.0*  --   --    HGB 12.7 11.9 12.9 12.0    168  --   --      Basic Metabolic Panel  Recent Labs   Lab 03/16/21  0341 03/15/21  1805 03/15/21  1334 03/15/21  1157 03/15/21  0843 03/15/21  0740     --  140 137 138  --    POTASSIUM 4.4 4.0 3.4 2.8* 3.0* 3.3*   CHLORIDE 101  --  105  --   --   --    CO2 27  --  29  --   --   --    BUN 12  --  11  --   --   --    CR 0.66  --  0.65  --   --  0.71   *  --  124* 176* 97  --      Liver Function Tests  Recent Labs   Lab 03/16/21  0341 03/15/21  1334   AST 26 20   ALT 16 16   ALKPHOS 59 60   BILITOTAL 0.9 0.6   ALBUMIN 3.4 3.2*   INR  --  1.49*     Pancreatic Enzymes  No lab results found in last 7 days.  Coagulation Profile  Recent Labs   Lab 03/15/21  1334   INR 1.49*   PTT 33         5. RADIOLOGY:   Recent Results (from the past 24 hour(s))   XR Chest Port 1 View    Narrative     Exam: XR CHEST PORT 1 VW, 3/15/2021 2:14 PM    Comparison: Chest x-ray 3/2/2021    History: Post Op CVTS Surgery    Findings:  A single portable AP semiupright view of the chest is performed.  Postsurgical changes of complete pericardectomy with numerous  mediastinal surgical clips. Median sternotomy wires are intact. Right  internal jugular Matthews-Ezra catheter is in the right main pulmonary  artery. Bilateral chest tubes and mediastinal drains are in place.    Trachea is midline. Mediastinum is within normal limits.  Cardiopulmonary silhouette is within normal limits. Streaky  retrocardiac opacities. Possible trace pleural effusions. There is no  pneumothorax or pleural effusion. The upper abdomen is unremarkable.      Impression    Impression:   1. Matthews-Ezra catheter tip is in the central right main pulmonary  artery.  2. Bibasilar atelectasis. Possible trace pleural effusions.  3. No appreciable pneumothorax.    I have personally reviewed the examination and initial interpretation  and I agree with the findings.    ROYAL ELLSWORTH, DO   XR Chest Port 1 View    Narrative    Exam: XR CHEST PORT 1 VW, 3/16/2021 1:43 AM    Indication: Post Op CVTS Surgery    Comparison: 3/15/2021    Findings:   Single portable view of the chest. Sternotomy wires in place. Right  internal jugular Matthews-Ezra catheter is in the main pulmonary artery.  Bilateral chest tubes and mediastinal drains are in similar position.    Cardiomediastinal silhouette and upper abdomen are unchanged.. Streaky  retrocardiac opacities. Possible trace pleural effusions. There is no  pneumothorax or pleural effusion. The upper abdomen is unremarkable.      Impression    Impression:   1. Support devices in similar position.  2. Bibasilar atelectasis. Unchanged possible trace pleural effusions.    I have personally reviewed the examination and initial interpretation  and I agree with the findings.    STEFANIE WESTBROOK MD        =========================================

## 2021-03-16 NOTE — PROGRESS NOTES
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 Jang on 3/15/21.   and placement of bilateral T5-6 erector spinae (ES) catheters for analgesia    Subjective and Interval History:  This morning, RN noticed that right ES catheter ropivacaine cartridge was not functional-unable to program/prime the catheter, pharmacy believes it's related to the cartridge, new cartridge ordered. Seen at 1020.  Patient reports 7/10 at rest and  pain control with current nerve block infusion and analgesics (see below).  Ambulating with stand by assistance,  Denies  weakness, paresthesias, circumoral numbness, metallic taste or tinnitus. Patient has Soria in place, chest tubes in place.  Tolerating ordered diet, has nausea.    Antithrombotic/Thrombolytic Therapy ordered:  heparin ANTICOAGULANT injection 5,000 Units SC, Q8H       Analgesic Medications:   Medications related to Pain Management (From now, onward)    Start     Dose/Rate Route Frequency Ordered Stop    03/18/21 0000  acetaminophen (TYLENOL) tablet 650 mg      650 mg Oral or Feeding Tube EVERY 4 HOURS PRN 03/15/21 1325      03/15/21 2000  senna-docusate (SENOKOT-S/PERICOLACE) 8.6-50 MG per tablet 1 tablet      1 tablet Oral or Feeding Tube 2 TIMES DAILY 03/15/21 1325      03/15/21 1720  hydrOXYzine (ATARAX) tablet 25 mg      25 mg Oral EVERY 6 HOURS PRN 03/15/21 1721      03/15/21 1720  hydrOXYzine (ATARAX) tablet 50 mg      50 mg Oral EVERY 6 HOURS PRN 03/15/21 1721      03/15/21 1500  acetaminophen (TYLENOL) tablet 975 mg      975 mg Oral or Feeding Tube EVERY 8 HOURS 03/15/21 1325 03/18/21 1459    03/15/21 1330  polyethylene glycol (MIRALAX) Packet 17 g      17 g Oral or Feeding Tube DAILY 03/15/21 1325      03/15/21 1324  oxyCODONE (ROXICODONE) tablet 5-10 mg       5-10 mg Oral or Feeding Tube EVERY 3 HOURS PRN 03/15/21 1325      03/15/21 1324  methocarbamol (ROBAXIN) tablet 750 mg      750 mg Oral or Feeding Tube 4 TIMES DAILY PRN 03/15/21 1325      03/15/21 1324  lidocaine 1 % 0.1-1 mL      0.1-1 mL Other EVERY 1 HOUR PRN 03/15/21 1325      03/15/21 1324  lidocaine (LMX4) cream       Topical EVERY 1 HOUR PRN 03/15/21 1325      03/15/21 0800  ropivacaine 0.2% in NS perineural infusion simple       Perineural Continuous Nerve Block 03/15/21 0746      03/15/21 0800  ropivacaine 0.2% in NS perineural infusion simple       Perineural Continuous Nerve Block 03/15/21 0746      03/15/21 0630  dexmedetomidine (PRECEDEX) 400 mcg in 0.9% sodium chloride 100 mL      0.2-1.2 mcg/kg/hr × 65.8 kg  3.3-19.7 mL/hr  Intravenous CONTINUOUS 03/15/21 0607      03/15/21 0607  lidocaine 1 % 0.1-1 mL      0.1-1 mL Other EVERY 1 HOUR PRN 03/15/21 0609      03/15/21 0607  lidocaine (LMX4) cream       Topical EVERY 1 HOUR PRN 03/15/21 0609      03/15/21 0607  fentaNYL (PF) (SUBLIMAZE) injection 25-50 mcg      25-50 mcg Intravenous EVERY 2 MIN PRN 03/15/21 0607      03/15/21 0607  midazolam (VERSED) injection 1-2 mg      1-2 mg Intravenous EVERY 4 MIN PRN 03/15/21 0607      03/15/21 0607  lidocaine (LMX4) cream       Topical EVERY 1 HOUR PRN 03/15/21 0607      03/15/21 0607  lidocaine 1 % 0.1-1 mL      0.1-1 mL Other EVERY 1 HOUR PRN 03/15/21 0607      03/15/21 0607  aspirin (ASA) chewable tablet 162 mg      162 mg Oral PRE-OP/PRE-PROCEDURE 03/15/21 0607      03/15/21 0607  aspirin (ASA) chewable tablet 81 mg      81 mg Oral PRE-OP/PRE-PROCEDURE 03/15/21 0607      03/15/21 0607  lidocaine 2 % 200 mg, magnesium sulfate 1 g, mannitol 25 % 12.5 g, sodium bicarbonate 20 mEq CARDIOPLEGIA (Hot Shot) solution       PERFUSION ON CALL TO O.R. 03/15/21 0607             Objective:  Lab results  Recent Labs   Lab Test 03/16/21  0341   WBC 13.2*   RBC 4.34   HGB 12.7   HCT 40.0   MCV 92   MCH 29.3   MCHC 31.8    RDW 13.0          Lab Results   Component Value Date    INR 1.49 03/15/2021    INR 1.32 03/02/2021    INR 1.26 01/12/2021       Vitals:    Temp:  [36.8  C (98.2  F)-37.2  C (99  F)] 37.2  C (99  F)  Pulse:  [60-75] 65  Resp:  [8-20] 15  BP: ()/(58-80) 102/65  MAP:  [76 mmHg-102 mmHg] 81 mmHg  Arterial Line BP: ()/() 88/75  SpO2:  [96 %-100 %] 99 %    Exam:   GEN: alert and no distress  NEURO/MSK: moving UE and LE independently. Sitting up in chair  Strength LE 5/5  and overall symmetric  SKIN: bilateral erector spinae (ES) catheter sites with dressing c/d/i, no tenderness, erythema, heme, edema      Assessment and Plan:     Patient is receiving moderate analgesia with current multimodal therapy including 0.2% ropivacaine PIB (programmed intermittent bolus) at 7 mL/hr bolus per catheter.  Motor function intact and is meeting activity goals.  No evidence of adverse side effects related to local anesthetic.     Bolus administered at 1040    MEDICATION: PF bupivacaine  0.25%, total bolus 10mL, via 5ml each catheter.    PROCEDURE: Clinician bolus administered via left and right nerve block catheter, without complication, negative aspirate before and between each mL.  No symptoms of local anesthetic systemic toxicity (LAST). Remained with and assessed patient for 10 min post-injection. BP, P and MAP stable    POST-PROCEDURE: Bedside RN aware of need to continue BP, P and MAP monitoring Q 10 min for an additional 20 min. Contact RAPS (jobcode ID [id number]) if any of the following: patient experiencing any untoward effects, SBP< 90, P < 50 or > 120, MAP < 60     - patient can be evaluated to receive local anesthetic bolus Q 12 hr PRN pain not controlled with continuous infusion.  Bedside nurse must page RAPS to request bolus    Catheter Day #1 bilateral T5-6 erector spinae (ES) catheters/PIB infusion:    Continue 0.2% ropivacaine 7 mL bolus/hr per catheter, total infusion 14mL/hr, max catheter of 7  days        Patient can be evaluated to receive local anesthetic bolus Q 12 hr PRN, bedside nurse must page RAPS #: [id number] to request bolus    Antithrombotic/thrombolytic therapy:     heparin ANTICOAGULANT injection 5,000 Units SC, Q8H as ordered.     Please contact RAPS, jobcode ID: [id number] prior to any medication changes        RAPS will continue to follow and adjust as needed    Viola Kirkpatrick MD  [facility]  3/16/2021 8:28 AM    RAPS Contact Info (24 hour job code pager is the last 4 digits) For in-house use only:   Job code ID: [id number]   [place] 0599  Peds [id number]  [employer] phone: dial * * * [phone], enter jobcode ID, then enter call-back number.    Text: Use [employer] on the Intranet <Paging/Directory> tab and enter Jobcode ID.   If no call back at any time, contact the hospital  and ask for RAPS attending or backup

## 2021-03-16 NOTE — PLAN OF CARE
S: Received care of pt at 11:00. Pt has orders to 6D. All lines removed. Pt was SOB, felt pressure on chest. Pain and anxiety discussed during rounds. Medications helped patient she was able to get some rest and breath with more ease. Report given to Constantin on 6D at 1600. Pt transferred to 6D at 1745.  B: Pt admitted post pericardectomy   A: See flowsheets/labs/MAR  R: Will continue to monitor

## 2021-03-16 NOTE — PLAN OF CARE
Neuro: Pt A&Ox4. Moves all extremities and follows commands. Pt c/o incisional chest pain - PRN Oxycodone 10mg given x3. Ropivacaine epidurals at 14ml/hr.     Cardiac: SR. HR 60s. MAPS>65, SBP<140s. [place] numbers completed Q4hrs.     Respiratory: On 2L O2/NC for comfort. Pt c/o SOB at rest. Lung sounds clear/diminished.     GI/: Pt tolerating clear liquids. Pt c/o intermittent nausea/abdominal pain - PRN Zofran given x1 which resolved issues. LBM 3/14. Urine output 30ml/hr until 0100 (MD aware); urine output decreased to 15-20mll/hr since 0100 - MD notified, continue to monitor.      Incisons: Chest incision dressing CDI. Mediastinal chest tubes x2 with 10-15ml/hr sanguinous output present. Pleural chest tubes x2 with 10-50ml/hr sanguinous output present.     Lines: PIVx2. R IJ with swan. L arterial line.     No family called RN for update.   Continue to monitor and update MD as needed. Continue plan of care.       Problem: Adult Inpatient Plan of Care  Goal: Plan of Care Review  Outcome: Improving

## 2021-03-16 NOTE — PROGRESS NOTES
CLINICAL NUTRITION SERVICES - BRIEF NOTE    Patient is a 35 year old female with malnutrition screening tool (MST) score >2 unsure history of weight loss.    Per documented weight history and pt report, usual body weight fluctuates around ~140-145 lbs. No concerns for weight loss.   Wt Readings from Last 10 Encounters:   03/16/21 64 kg (141 lb 1.5 oz)   03/02/21 65.8 kg (145 lb)   01/19/21 64.9 kg (143 lb)   01/12/21 66.3 kg (146 lb 2.6 oz)   12/10/20 66.2 kg (146 lb)   05/12/16 63.3 kg (139 lb 9.6 oz)     Also received provider consult for nutrition education with comments post op cardiovascular surgery (automatic consult on post-op order set). S/p percardiectomy on 3/15. Nutrition education not indicated.    RD will follow per LOS protocol or if re-consulted.     Valencia Mansfield RD, LD  [id number]  Pgr: [id number]

## 2021-03-16 NOTE — PROGRESS NOTES
03/16/21 0800   Quick Adds   Type of Visit Initial Occupational Therapy Evaluation   Living Environment   People in home alone;child(nickie), dependent   Current Living Arrangements apartment   Home Accessibility stairs to enter home   Number of Stairs, Main Entrance   (one flight)   Stair Railings, Main Entrance railings on both sides of stairs   Transportation Anticipated car, drives self;family or friend will provide   Living Environment Comments Pt has a tub shower w/ no AE. Laundry in unit. Pt lives with her 15 year old son half time.   Self-Care   Usual Activity Tolerance good   Current Activity Tolerance moderate   Regular Exercise No   Equipment Currently Used at Home none   Activity/Exercise/Self-Care Comment I in all I/ADLs. Reports that she was unable to exercise regularly 2/2 difficulty breathing. Enjoys doing yoga. 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    Disability/Function   Hearing Difficulty or Deaf no   Wear Glasses or Blind no   Concentrating, Remembering or Making Decisions Difficulty no   Difficulty Communicating no   Difficulty Eating/Swallowing no   Walking or Climbing Stairs Difficulty no   Dressing/Bathing Difficulty no   Toileting issues no   Doing Errands Independently Difficulty (such as shopping) no   Fall history within last six months no   Change in Functional Status Since Onset of Current Illness/Injury yes   General Information   Onset of Illness/Injury or Date of Surgery 03/15/21   Referring Physician Kirit Jang   Patient/Family Therapy Goal Statement (OT) None specific, return to PLOF   Additional Occupational Profile Info/Pertinent History of Current Problem Sissy Donaldson is a 35yoF with a 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 Jang on 3/15/21.   Performance Patterns (Routines, Roles, Habits) Mother, works in office setting   Existing Precautions/Restrictions sternal   Heart Disease  Risk Factors Medical history   General Observations and Info Activity: ambulate w/ A, [place] line in place, art line in L wrist   Cognitive Status Examination   Orientation Status orientation to person, place and time   Cognitive Status Comments Pt is grossly oriented and attentive   Sensory   Sensory Quick Adds No deficits were identified   Pain Assessment   Patient Currently in Pain Yes, see Vital Sign flowsheet  (chest, abdomen, back)   Range of Motion Comprehensive   General Range of Motion bilateral upper extremity ROM WNL   Strength Comprehensive (MMT)   Comment, General Manual Muscle Testing (MMT) Assessment Not formally tested 2/2 pain + post op precautions   Bed Mobility   Bed Mobility rolling left;supine-sit   Rolling Left [place] (Bed Mobility) supervision   Supine-Sit [place] (Bed Mobility) supervision   Comment (Bed Mobility) SBA/CGA for line management, HOB elevated   Transfers   Transfers sit-stand transfer;bed-chair transfer   Transfer Comments CGA x2 for line management   Transfer Skill: Bed to Chair/Chair to Bed   Bed-Chair [place] (Transfers) contact guard   Transfer Comments + A for line management   Sit-Stand Transfer   Sit-Stand [place] (Transfers) contact guard   Sit/Stand Transfer Comments + A for line management   Instrumental Activities of Daily Living (IADL)   Previous Responsibilities meal prep;housekeeping;work;driving;finances;medication management;;laundry   IADL Comments I for all IADLs. 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 Drives self but has a ride available if needed.   Clinical Impression   Criteria for Skilled Therapeutic Interventions Met (OT) yes   OT Diagnosis Decreased ADL/IADL I   OT Problem List-Impairments impacting ADL problems related to;activity tolerance impaired;mobility;post-surgical precautions;fear & anxiety;pain  (nausea)   Assessment of Occupational Performance 3-5 Performance Deficits   Identified  Performance Deficits bathing, dressing, home management, driving   Planned Therapy Interventions (OT) ADL retraining;IADL retraining;bed mobility training;home program guidelines;progressive activity/exercise;risk factor education   Clinical Decision Making Complexity (OT) low complexity   Therapy Frequency (OT) Daily   Predicted Duration of Therapy 2 weeks   Risk & Benefits of therapy have been explained evaluation/treatment results reviewed;care plan/treatment goals reviewed;risks/benefits reviewed;participants voiced agreement with care plan;participants included;patient   Comment-Clinical Impression Pt will benefit from skilled OT to address the above deficits. See daily flowsheet for details on treatment provided   OT Discharge Planning    OT Discharge Recommendation (DC Rec) Home with assist;home with outpatient cardiac rehab   OT Rationale for DC Rec Pt is below functional baseline, primarily limited by pain and nausea at time of eval. Anticipate pt will progress to being safe to go home once medically stable   OT Brief overview of current status  CGA x2 for line management, [place] line in place, L radial art line, 2 CT ok to WS during therapy   Total Evaluation Time (Minutes)   Total Evaluation Time (Minutes) 10   Skin WDL   Skin Inspection   (FULL)   Skin WDL WDL   Coping Strategies   Trust Relationship/Rapport care explained;emotional support provided;questions answered;thoughts/feelings acknowledged

## 2021-03-17 ENCOUNTER — APPOINTMENT (OUTPATIENT)
Dept: OCCUPATIONAL THERAPY | Facility: CLINIC | Age: 36
DRG: 271 | End: 2021-03-17
Attending: SURGERY
Payer: COMMERCIAL

## 2021-03-17 ENCOUNTER — APPOINTMENT (OUTPATIENT)
Dept: GENERAL RADIOLOGY | Facility: CLINIC | Age: 36
DRG: 271 | End: 2021-03-17
Attending: PHYSICIAN ASSISTANT
Payer: COMMERCIAL

## 2021-03-17 LAB
ANION GAP SERPL CALCULATED.3IONS-SCNC: 7 MMOL/L (ref 3–14)
BUN SERPL-MCNC: 14 MG/DL (ref 7–30)
CALCIUM SERPL-MCNC: 8.8 MG/DL (ref 8.5–10.1)
CHLORIDE SERPL-SCNC: 97 MMOL/L (ref 94–109)
CO2 SERPL-SCNC: 27 MMOL/L (ref 20–32)
CREAT SERPL-MCNC: 0.75 MG/DL (ref 0.52–1.04)
ERYTHROCYTE [DISTWIDTH] IN BLOOD BY AUTOMATED COUNT: 13.2 % (ref 10–15)
GFR SERPL CREATININE-BSD FRML MDRD: >90 ML/MIN/{1.73_M2}
GLUCOSE BLDC GLUCOMTR-MCNC: 78 MG/DL (ref 70–99)
GLUCOSE BLDC GLUCOMTR-MCNC: 87 MG/DL (ref 70–99)
GLUCOSE SERPL-MCNC: 101 MG/DL (ref 70–99)
HCT VFR BLD AUTO: 38.9 % (ref 35–47)
HGB BLD-MCNC: 12.6 G/DL (ref 11.7–15.7)
MAGNESIUM SERPL-MCNC: 2.2 MG/DL (ref 1.6–2.3)
MCH RBC QN AUTO: 30.4 PG (ref 26.5–33)
MCHC RBC AUTO-ENTMCNC: 32.4 G/DL (ref 31.5–36.5)
MCV RBC AUTO: 94 FL (ref 78–100)
PLATELET # BLD AUTO: 169 10E9/L (ref 150–450)
POTASSIUM SERPL-SCNC: 3.8 MMOL/L (ref 3.4–5.3)
RBC # BLD AUTO: 4.14 10E12/L (ref 3.8–5.2)
SODIUM SERPL-SCNC: 131 MMOL/L (ref 133–144)
WBC # BLD AUTO: 12.2 10E9/L (ref 4–11)

## 2021-03-17 PROCEDURE — 71046 X-RAY EXAM CHEST 2 VIEWS: CPT

## 2021-03-17 PROCEDURE — 999N001017 HC STATISTIC GLUCOSE BY METER IP

## 2021-03-17 PROCEDURE — 250N000009 HC RX 250: Performed by: PHYSICIAN ASSISTANT

## 2021-03-17 PROCEDURE — 250N000013 HC RX MED GY IP 250 OP 250 PS 637: Performed by: NURSE PRACTITIONER

## 2021-03-17 PROCEDURE — 83735 ASSAY OF MAGNESIUM: CPT | Performed by: SURGERY

## 2021-03-17 PROCEDURE — 120N000003 HC R&B IMCU UMMC

## 2021-03-17 PROCEDURE — 250N000011 HC RX IP 250 OP 636: Performed by: SURGERY

## 2021-03-17 PROCEDURE — 85027 COMPLETE CBC AUTOMATED: CPT | Performed by: SURGERY

## 2021-03-17 PROCEDURE — 250N000011 HC RX IP 250 OP 636: Performed by: NURSE PRACTITIONER

## 2021-03-17 PROCEDURE — 250N000013 HC RX MED GY IP 250 OP 250 PS 637: Performed by: PHYSICIAN ASSISTANT

## 2021-03-17 PROCEDURE — 97530 THERAPEUTIC ACTIVITIES: CPT | Mod: GO

## 2021-03-17 PROCEDURE — 97110 THERAPEUTIC EXERCISES: CPT | Mod: GO

## 2021-03-17 PROCEDURE — 80048 BASIC METABOLIC PNL TOTAL CA: CPT | Performed by: SURGERY

## 2021-03-17 PROCEDURE — 250N000011 HC RX IP 250 OP 636: Performed by: PHYSICIAN ASSISTANT

## 2021-03-17 PROCEDURE — 36415 COLL VENOUS BLD VENIPUNCTURE: CPT | Performed by: SURGERY

## 2021-03-17 PROCEDURE — 250N000013 HC RX MED GY IP 250 OP 250 PS 637: Performed by: SURGERY

## 2021-03-17 PROCEDURE — 71046 X-RAY EXAM CHEST 2 VIEWS: CPT | Mod: 26 | Performed by: RADIOLOGY

## 2021-03-17 RX ORDER — ACETAMINOPHEN 325 MG/1
975 TABLET ORAL EVERY 8 HOURS
Status: CANCELLED | OUTPATIENT
Start: 2021-03-17 | End: 2021-03-18

## 2021-03-17 RX ORDER — POLYETHYLENE GLYCOL 3350 17 G/17G
17 POWDER, FOR SOLUTION ORAL DAILY
Status: CANCELLED | OUTPATIENT
Start: 2021-03-18

## 2021-03-17 RX ORDER — BUPIVACAINE HYDROCHLORIDE 5 MG/ML
5 INJECTION, SOLUTION EPIDURAL; INTRACAUDAL ONCE
Status: COMPLETED | OUTPATIENT
Start: 2021-03-17 | End: 2021-03-17

## 2021-03-17 RX ORDER — FUROSEMIDE 10 MG/ML
20 INJECTION INTRAMUSCULAR; INTRAVENOUS ONCE
Status: COMPLETED | OUTPATIENT
Start: 2021-03-17 | End: 2021-03-17

## 2021-03-17 RX ADMIN — ACETAMINOPHEN 975 MG: 325 TABLET, FILM COATED ORAL at 15:19

## 2021-03-17 RX ADMIN — ACETAMINOPHEN 975 MG: 325 TABLET, FILM COATED ORAL at 06:17

## 2021-03-17 RX ADMIN — SERTRALINE HYDROCHLORIDE 100 MG: 50 TABLET ORAL at 08:44

## 2021-03-17 RX ADMIN — OXYCODONE HYDROCHLORIDE 10 MG: 5 TABLET ORAL at 11:51

## 2021-03-17 RX ADMIN — HYDROMORPHONE HYDROCHLORIDE 0.5 MG: 1 INJECTION, SOLUTION INTRAMUSCULAR; INTRAVENOUS; SUBCUTANEOUS at 21:22

## 2021-03-17 RX ADMIN — SENNOSIDES AND DOCUSATE SODIUM 1 TABLET: 8.6; 5 TABLET ORAL at 08:44

## 2021-03-17 RX ADMIN — OXYCODONE HYDROCHLORIDE 10 MG: 5 TABLET ORAL at 04:12

## 2021-03-17 RX ADMIN — HEPARIN SODIUM 5000 UNITS: 5000 INJECTION, SOLUTION INTRAVENOUS; SUBCUTANEOUS at 04:12

## 2021-03-17 RX ADMIN — HYDROMORPHONE HYDROCHLORIDE 0.5 MG: 1 INJECTION, SOLUTION INTRAMUSCULAR; INTRAVENOUS; SUBCUTANEOUS at 13:20

## 2021-03-17 RX ADMIN — ACETAMINOPHEN 975 MG: 325 TABLET, FILM COATED ORAL at 23:38

## 2021-03-17 RX ADMIN — HEPARIN SODIUM 5000 UNITS: 5000 INJECTION, SOLUTION INTRAVENOUS; SUBCUTANEOUS at 20:45

## 2021-03-17 RX ADMIN — OXYCODONE HYDROCHLORIDE 10 MG: 5 TABLET ORAL at 08:44

## 2021-03-17 RX ADMIN — SENNOSIDES AND DOCUSATE SODIUM 1 TABLET: 8.6; 5 TABLET ORAL at 20:45

## 2021-03-17 RX ADMIN — Medication 12.5 MG: at 08:54

## 2021-03-17 RX ADMIN — OXYCODONE HYDROCHLORIDE 10 MG: 5 TABLET ORAL at 15:19

## 2021-03-17 RX ADMIN — HYDROXYCHLOROQUINE SULFATE 200 MG: 200 TABLET, FILM COATED ORAL at 08:44

## 2021-03-17 RX ADMIN — PANTOPRAZOLE SODIUM 40 MG: 40 TABLET, DELAYED RELEASE ORAL at 08:44

## 2021-03-17 RX ADMIN — FUROSEMIDE 20 MG: 10 INJECTION, SOLUTION INTRAVENOUS at 09:30

## 2021-03-17 RX ADMIN — OXYCODONE HYDROCHLORIDE 10 MG: 5 TABLET ORAL at 20:03

## 2021-03-17 RX ADMIN — TORSEMIDE 20 MG: 20 TABLET ORAL at 08:44

## 2021-03-17 RX ADMIN — HEPARIN SODIUM 5000 UNITS: 5000 INJECTION, SOLUTION INTRAVENOUS; SUBCUTANEOUS at 11:51

## 2021-03-17 RX ADMIN — HYDROMORPHONE HYDROCHLORIDE 0.3 MG: 1 INJECTION, SOLUTION INTRAMUSCULAR; INTRAVENOUS; SUBCUTANEOUS at 08:55

## 2021-03-17 RX ADMIN — HYDROMORPHONE HYDROCHLORIDE 0.5 MG: 1 INJECTION, SOLUTION INTRAMUSCULAR; INTRAVENOUS; SUBCUTANEOUS at 04:55

## 2021-03-17 RX ADMIN — BUPIVACAINE HYDROCHLORIDE 25 MG: 5 INJECTION, SOLUTION EPIDURAL; INTRACAUDAL at 10:46

## 2021-03-17 ASSESSMENT — MIFFLIN-ST. JEOR: SCORE: 1343.88

## 2021-03-17 NOTE — PROGRESS NOTES
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 Jang on 3/15/21.   and placement of bilateral T5-6 erector spinae (ES) catheters for analgesia    Subjective and Interval History: Overnight events: no acute event.  Seen at 1045.  Patient reports 5/10 at rest and 5/10 with activity, moderater pain control with current nerve block infusion and analgesics (see below).  Ambulating with stand by assistance,  Denies weakness, paresthesias, circumoral numbness, metallic taste or tinnitus. Tolerating a regular  diet, denies nausea.    Antithrombotic/Thrombolytic Therapy ordered:    heparin ANTICOAGULANT injection 5,000 Units SC, Q8H        Analgesic Medications:   Medications related to Pain Management (From now, onward)    Start     Dose/Rate Route Frequency Ordered Stop    03/18/21 0000  acetaminophen (TYLENOL) tablet 650 mg      650 mg Oral or Feeding Tube EVERY 4 HOURS PRN 03/15/21 1325      03/16/21 1137  HYDROmorphone (PF) (DILAUDID) injection 0.3-0.5 mg      0.3-0.5 mg Intravenous EVERY 2 HOURS PRN 03/16/21 1145      03/16/21 1133  LORazepam (ATIVAN) tablet 1 mg      1 mg Oral EVERY 6 HOURS PRN 03/16/21 1145      03/15/21 2000  senna-docusate (SENOKOT-S/PERICOLACE) 8.6-50 MG per tablet 1 tablet      1 tablet Oral or Feeding Tube 2 TIMES DAILY 03/15/21 1325      03/15/21 1720  hydrOXYzine (ATARAX) tablet 25 mg      25 mg Oral EVERY 6 HOURS PRN 03/15/21 1721      03/15/21 1720  hydrOXYzine (ATARAX) tablet 50 mg      50 mg Oral EVERY 6 HOURS PRN 03/15/21 1721      03/15/21 1500  acetaminophen (TYLENOL) tablet 975 mg      975 mg Oral or Feeding Tube EVERY 8 HOURS 03/15/21 1325 03/18/21 1459    03/15/21 1330  polyethylene glycol (MIRALAX) Packet 17 g      17 g Oral or Feeding Tube DAILY 03/15/21 1325       03/15/21 1324  oxyCODONE (ROXICODONE) tablet 5-10 mg      5-10 mg Oral or Feeding Tube EVERY 3 HOURS PRN 03/15/21 1325      03/15/21 1324  methocarbamol (ROBAXIN) tablet 750 mg      750 mg Oral or Feeding Tube 4 TIMES DAILY PRN 03/15/21 1325      03/15/21 0800  ropivacaine 0.2% in NS perineural infusion simple       Perineural Continuous Nerve Block 03/15/21 0746      03/15/21 0800  ropivacaine 0.2% in NS perineural infusion simple       Perineural Continuous Nerve Block 03/15/21 0746      03/15/21 0607  lidocaine (LMX4) cream       Topical EVERY 1 HOUR PRN 03/15/21 0607      03/15/21 0607  lidocaine 1 % 0.1-1 mL      0.1-1 mL Other EVERY 1 HOUR PRN 03/15/21 0607             Objective:  Lab results  Recent Labs   Lab Test 03/17/21  0626   WBC 12.2*   RBC 4.14   HGB 12.6   HCT 38.9   MCV 94   MCH 30.4   MCHC 32.4   RDW 13.2          Lab Results   Component Value Date    INR 1.49 03/15/2021    INR 1.32 03/02/2021    INR 1.26 01/12/2021       Vitals:    Temp:  [36.5  C (97.7  F)-37.1  C (98.7  F)] 36.9  C (98.5  F)  Pulse:  [65-72] 70  Resp:  [8-17] 12  BP: ()/(60-82) 105/75  SpO2:  [95 %-100 %] 95 %    Exam:   GEN: alert and no distress  NEURO/MSK: Moving Ue and LE independently.  Strength LE 5/5  and overall symmetric.  SKIN: bilateral erector spinae (ES) catheter sites  with dressing c/d/i, no tenderness, erythema, heme, edema      Assessment and Plan:     Patient is receiving moderate analgesia with current multimodal therapy including 0.2% ropivacaine PIB (programmed intermittent bolus) at 7 mL/hr bolus per catheter.  Motor function intact and is meeting activity goals.  No evidence of adverse side effects related to local anesthetic.     Bolus administered at 1100    MEDICATION: PF bupivacaine  0.25%, total bolus 5mL, via right catheter. Unable to access left catheter.    PROCEDURE: Clinician bolus administered via right nerve block catheter, without complication, negative aspirate before and between  each mL.  No symptoms of local anesthetic systemic toxicity (LAST). Remained with and assessed patient for 10 min post-injection. BP, P and MAP stable    POST-PROCEDURE: Bedside RN aware of need to continue BP, P and MAP monitoring Q 10 min for an additional 20 min. Contact RAPS (jobcode ID [id number]) if any of the following: patient experiencing any untoward effects, SBP< 90, P < 50 or > 120, MAP < 60     - patient can be evaluated to receive local anesthetic bolus Q 12 hr PRN pain not controlled with continuous infusion.  Bedside nurse must page RAPS to request bolus      Catheter Day #2 bilateral T5-6 erector spinae (ES) catheters/PIB infusion:    Continue 0.2% ropivacaine 7 mL bolus/hr per catheter, total infusion 14mL/hr, max catheter of 7 days  Patient can be evaluated to receive local anesthetic bolus Q 12 hr PRN, bedside nurse must page RAPS #: [id number] to request bolus    Antithrombotic/thrombolytic therapy:     heparin ANTICOAGULANT injection 5,000 Units SC, Q8H as ordered.as ordered.     Please contact RAPS, jobcode ID: [id number] prior to any medication changes    Follow up:        RAPS will continue to follow and adjust as needed    Viola Kirkpatrick MD  Regional Anesthesia Pain Service  3/17/2021 8:03 AM    RAPS Contact Info (24 hour job code pager is the last 4 digits) For in-house use only:   Job code ID: [id number]   [facility] [place] [id number]  Peds [phone]  [employer] phone: dial * * * [phone], enter jobcode ID, then enter call-back number.    Text: Use SpinNote on the Intranet <Paging/Directory> tab and enter Jobcode ID.   If no call back at any time, contact the hospital  and ask for RAPS attending or backup

## 2021-03-17 NOTE — PROGRESS NOTES
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 Jang on 3/15/21.   and placement of bilateral T5-6 erector spinae (ES) catheters for analgesia    Subjective and Interval History: Overnight events: no acute event.  Seen at 1045.  Patient reports 5/10 at rest and 5/10 with activity, moderater pain control with current nerve block infusion and analgesics (see below).  Ambulating with stand by assistance,  Denies weakness, paresthesias, circumoral numbness, metallic taste or tinnitus. Tolerating a regular  diet, denies nausea.    Antithrombotic/Thrombolytic Therapy ordered:    heparin ANTICOAGULANT injection 5,000 Units SC, Q8H        Analgesic Medications:   Medications related to Pain Management (From now, onward)    Start     Dose/Rate Route Frequency Ordered Stop    03/18/21 0000  acetaminophen (TYLENOL) tablet 650 mg      650 mg Oral or Feeding Tube EVERY 4 HOURS PRN 03/15/21 1325      03/16/21 1137  HYDROmorphone (PF) (DILAUDID) injection 0.3-0.5 mg      0.3-0.5 mg Intravenous EVERY 2 HOURS PRN 03/16/21 1145      03/16/21 1133  LORazepam (ATIVAN) tablet 1 mg      1 mg Oral EVERY 6 HOURS PRN 03/16/21 1145      03/15/21 2000  senna-docusate (SENOKOT-S/PERICOLACE) 8.6-50 MG per tablet 1 tablet      1 tablet Oral or Feeding Tube 2 TIMES DAILY 03/15/21 1325      03/15/21 1720  hydrOXYzine (ATARAX) tablet 25 mg      25 mg Oral EVERY 6 HOURS PRN 03/15/21 1721      03/15/21 1720  hydrOXYzine (ATARAX) tablet 50 mg      50 mg Oral EVERY 6 HOURS PRN 03/15/21 1721      03/15/21 1500  acetaminophen (TYLENOL) tablet 975 mg      975 mg Oral or Feeding Tube EVERY 8 HOURS 03/15/21 1325 03/18/21 1459    03/15/21 1330  polyethylene glycol (MIRALAX) Packet 17 g      17 g Oral or Feeding Tube DAILY 03/15/21 1325       03/15/21 1324  oxyCODONE (ROXICODONE) tablet 5-10 mg      5-10 mg Oral or Feeding Tube EVERY 3 HOURS PRN 03/15/21 1325      03/15/21 1324  methocarbamol (ROBAXIN) tablet 750 mg      750 mg Oral or Feeding Tube 4 TIMES DAILY PRN 03/15/21 1325      03/15/21 0800  ropivacaine 0.2% in NS perineural infusion simple       Perineural Continuous Nerve Block 03/15/21 0746      03/15/21 0800  ropivacaine 0.2% in NS perineural infusion simple       Perineural Continuous Nerve Block 03/15/21 0746      03/15/21 0607  lidocaine (LMX4) cream       Topical EVERY 1 HOUR PRN 03/15/21 0607      03/15/21 0607  lidocaine 1 % 0.1-1 mL      0.1-1 mL Other EVERY 1 HOUR PRN 03/15/21 0607             Objective:  Lab results  Recent Labs   Lab Test 03/17/21  0626   WBC 12.2*   RBC 4.14   HGB 12.6   HCT 38.9   MCV 94   MCH 30.4   MCHC 32.4   RDW 13.2          Lab Results   Component Value Date    INR 1.49 03/15/2021    INR 1.32 03/02/2021    INR 1.26 01/12/2021       Vitals:    Temp:  [97.7  F (36.5  C)] 97.7  F (36.5  C)  Pulse:  [65-75] 67  Resp:  [8-19] 13  BP: ()/(60-80) 103/61  MAP:  [81 mmHg-94 mmHg] 81 mmHg  Arterial Line BP: (85-96)/(75-81) 88/75  SpO2:  [95 %-100 %] 95 %    Exam:   GEN: alert and no distress  NEURO/MSK: Moving Ue and LE independently.  Strength LE 5/5  and overall symmetric.  SKIN: bilateral erector spinae (ES) catheter sites  with dressing c/d/i, no tenderness, erythema, heme, edema      Assessment and Plan:     Patient is receiving moderate analgesia with current multimodal therapy including 0.2% ropivacaine PIB (programmed intermittent bolus) at 7 mL/hr bolus per catheter.  Motor function intact and is meeting activity goals.  No evidence of adverse side effects related to local anesthetic.     Bolus administered at 1100    MEDICATION: PF bupivacaine  0.25%, total bolus 5mL, via right catheter. Unable to access left catheter.    PROCEDURE: Clinician bolus administered via right nerve block catheter,  without complication, negative aspirate before and between each mL.  No symptoms of local anesthetic systemic toxicity (LAST). Remained with and assessed patient for 10 min post-injection. BP, P and MAP stable    POST-PROCEDURE: Bedside RN aware of need to continue BP, P and MAP monitoring Q 10 min for an additional 20 min. Contact RAPS (jobcode ID [id number]) if any of the following: patient experiencing any untoward effects, SBP< 90, P < 50 or > 120, MAP < 60     - patient can be evaluated to receive local anesthetic bolus Q 12 hr PRN pain not controlled with continuous infusion.  Bedside nurse must page RAPS to request bolus      Catheter Day #2 bilateral T5-6 erector spinae (ES) catheters/PIB infusion:    Continue 0.2% ropivacaine 7 mL bolus/hr per catheter, total infusion 14mL/hr, max catheter of 7 days  Patient can be evaluated to receive local anesthetic bolus Q 12 hr PRN, bedside nurse must page RAPS #: [id number] to request bolus    Antithrombotic/thrombolytic therapy:     heparin ANTICOAGULANT injection 5,000 Units SC, Q8H as ordered.as ordered.     Please contact RAPS, jobcode ID: [id number] prior to any medication changes    Follow up:        RAPS will continue to follow and adjust as needed    - discussed plan with attending anesthesiologist    Ivana Rosales PA-C  [facility]  3/17/2021 8:03 AM    RAPS Contact Info (24 hour job code pager is the last 4 digits) For in-house use only:   Job code ID: [id number]   [place] 0599  Peds [phone]  [employer] phone: dial * * * [phone], enter jobcode ID, then enter call-back number.    Text: Use X-1 on the Intranet <Paging/Directory> tab and enter Jobcode ID.   If no call back at any time, contact the hospital  and ask for RAPS attending or backup

## 2021-03-17 NOTE — PROGRESS NOTES
Perioperative Pain Service Cross Cover Note  Bilateral paravertebral catheters bolused at 1900 PM with PF bupivacaine 0.25%, 5 mL on each side (10 mL total). Negative aspirate before, without complication, no symptoms of local anesthetic toxicity (LAST).  RN in room and aware to watch HR and BP for 30 min, and assess for any untoward effects to local anesthetic following injection and contact anesthesia for any questions or concerns.     Trena Mendoza MD  Anesthesiology Resident, PGY3  Trena Mendoza MD on 3/16/2021 at 8:00 PM

## 2021-03-17 NOTE — PROGRESS NOTES
Cardiovascular Surgery Progress Note  03/17/2021  Surgeon: Dr Jang           Assessment and Plan:     Sissy Donaldson is a 35 year old female with a PMH of SLE with recurrent pericardial effusions and pericarditis and constrictive pericarditis now s/p complete percardiectomy with Dr. Jang on 3/15/21.     Cardiovascular:    s/p: Complete Pericardectomy 3/15/2, Off pump  - Echo:preop 55-60%  - Intraop MIKE: severe TR  - Follow up TTE 3/18  - Chest tubes: med x 2 removed 3/17, Pleural tubes x 2(310 cc output)    Pulmonary:  Postop mechanical ventilation, resolved  - Extubated in the OR; Saturating well on RA 95%.   - Supplemental O2 PRN to keep sats > 92%. Wean off as tolerated.  - Pulm toilet, IS, activity and deep breathing     Neurology / MSK:  Postop pain  - Controlled with tylenol, IV Dilaudid, Robaxin, and oxycodone prn.  - Anxiety- ativan and atarax PRN     / Renal:  - No Hx of renal disease. Most recent creatinine 0.75, adequate UOP. WT up  - Diuresis: PTA torsemide 20 mg daily, Aldactone 12.5 mg daily. Lasix 20 mg IV x1 today. Reassess again in the am.    GI / FEN:   Nutrition  - Regular diet, continue bowel regimen      Infectious Disease:  Stress induced leukocytosis  - WBC 12.2, remains afebrile, no signs or symptoms of infection  - Completed perioperative antibiotics    Hematology:   Acute blood loss anemia and thrombocytopenia  - Hgb 12.6; Plt 169, no signs or symptoms of active bleeding     Prophylaxis:   - Stress ulcer prophylaxis: Pantoprazole 40 mg daily for 30 days  - DVT prophylaxis: Subcutaneous heparin, SCD    Disposition:   - Transferred to  on POD 1  - Rehab recommending discharge to home poss Friday.      Discussed with CVTS Fellow as needed.  Staff surgeons have been informed of changes through both verbal and written communication.      Brii Hatch PA-C  Cardiothoracic Surgery  Pager [phone]            Subjective/Interval History:     No overnight events. Less anxious  "today.  States pain is well managed on current regimen. Slept wellovernight.  Tolerating diet, is passing flatus, + BM. No nausea or vomiting.  Breathing well without complaints.   Working with therapies.   Denies chest pain, palpitations, dizziness, syncopal symptoms, fevers, chills, myalgias, or sternal popping/clicking.         Physical Exam:   Blood pressure 103/61, pulse 67, temperature 97.7  F (36.5  C), temperature source Axillary, resp. rate 13, height 1.651 m (5' 5\"), weight 64.8 kg (142 lb 13.7 oz), last menstrual period 03/01/2021, SpO2 95 %, not currently breastfeeding.  Vitals:    03/15/21 0551 03/16/21 0330 03/17/21 0617   Weight: 63.5 kg (139 lb 15.9 oz) 64 kg (141 lb 1.5 oz) 64.8 kg (142 lb 13.7 oz)      Weight; + 1.3 kg since admit and trending up.   24 hr Fluid status; net loss 200cc.   MAPs: 66-74    Gen: NAD, resting in bed  CV: RRR, S1S2 normal, no murmurs, rubs, or gallops. JVP not elevated  Pulm: clear to auscultation bilaterally, no rhonchi or wheezes  Abd: soft, non-tender, no guarding, +BM  Ext: trace lower extremity edema  Incision: clean, dry, intact, no erythema  Chest Tube sites: dressings clean and dry, serosanguinous, no air leak, output 185 ml meds (removed 3/17) and 310cc pleural's x 2( tubes split).  Neuro: grossly normal  Psych: calm, cooperative            Data:    Imaging:  reviewed recent imaging  No results found for this or any previous visit (from the past 24 hour(s)).      Labs:  GLUCOSE:   Recent Labs   Lab 03/17/21  0626 03/16/21  2237 03/16/21  1634 03/16/21  1255 03/16/21  0341 03/15/21  2356 03/15/21  1805 03/15/21  1334 03/15/21  1334 03/15/21  1157 03/15/21  0843   *  --   --   --  117*  --   --   --  124* 176* 97   BGM  --  98 102* 109* 125* 127* 137*   < >  --   --   --     < > = values in this interval not displayed.       "

## 2021-03-18 ENCOUNTER — APPOINTMENT (OUTPATIENT)
Dept: CARDIOLOGY | Facility: CLINIC | Age: 36
DRG: 271 | End: 2021-03-18
Attending: NURSE PRACTITIONER
Payer: COMMERCIAL

## 2021-03-18 ENCOUNTER — APPOINTMENT (OUTPATIENT)
Dept: OCCUPATIONAL THERAPY | Facility: CLINIC | Age: 36
DRG: 271 | End: 2021-03-18
Attending: SURGERY
Payer: COMMERCIAL

## 2021-03-18 ENCOUNTER — APPOINTMENT (OUTPATIENT)
Dept: GENERAL RADIOLOGY | Facility: CLINIC | Age: 36
DRG: 271 | End: 2021-03-18
Attending: PHYSICIAN ASSISTANT
Payer: COMMERCIAL

## 2021-03-18 LAB
ACID FAST STN SPEC QL: NORMAL
ACID FAST STN SPEC QL: NORMAL
ANION GAP SERPL CALCULATED.3IONS-SCNC: 8 MMOL/L (ref 3–14)
BUN SERPL-MCNC: 10 MG/DL (ref 7–30)
CALCIUM SERPL-MCNC: 8.8 MG/DL (ref 8.5–10.1)
CHLORIDE SERPL-SCNC: 98 MMOL/L (ref 94–109)
CO2 SERPL-SCNC: 28 MMOL/L (ref 20–32)
CREAT SERPL-MCNC: 0.68 MG/DL (ref 0.52–1.04)
ERYTHROCYTE [DISTWIDTH] IN BLOOD BY AUTOMATED COUNT: 12.9 % (ref 10–15)
GFR SERPL CREATININE-BSD FRML MDRD: >90 ML/MIN/{1.73_M2}
GLUCOSE SERPL-MCNC: 96 MG/DL (ref 70–99)
HCT VFR BLD AUTO: 36.6 % (ref 35–47)
HGB BLD-MCNC: 11.6 G/DL (ref 11.7–15.7)
MAGNESIUM SERPL-MCNC: 2 MG/DL (ref 1.6–2.3)
MCH RBC QN AUTO: 29.4 PG (ref 26.5–33)
MCHC RBC AUTO-ENTMCNC: 31.7 G/DL (ref 31.5–36.5)
MCV RBC AUTO: 93 FL (ref 78–100)
PLATELET # BLD AUTO: 154 10E9/L (ref 150–450)
POTASSIUM SERPL-SCNC: 3.7 MMOL/L (ref 3.4–5.3)
RBC # BLD AUTO: 3.95 10E12/L (ref 3.8–5.2)
SODIUM SERPL-SCNC: 134 MMOL/L (ref 133–144)
SPECIMEN SOURCE: NORMAL
WBC # BLD AUTO: 8 10E9/L (ref 4–11)

## 2021-03-18 PROCEDURE — 36415 COLL VENOUS BLD VENIPUNCTURE: CPT | Performed by: SURGERY

## 2021-03-18 PROCEDURE — 250N000013 HC RX MED GY IP 250 OP 250 PS 637: Performed by: PHYSICIAN ASSISTANT

## 2021-03-18 PROCEDURE — 250N000013 HC RX MED GY IP 250 OP 250 PS 637: Performed by: SURGERY

## 2021-03-18 PROCEDURE — 93306 TTE W/DOPPLER COMPLETE: CPT | Mod: 26 | Performed by: INTERNAL MEDICINE

## 2021-03-18 PROCEDURE — 97110 THERAPEUTIC EXERCISES: CPT | Mod: GO

## 2021-03-18 PROCEDURE — 250N000013 HC RX MED GY IP 250 OP 250 PS 637: Performed by: NURSE PRACTITIONER

## 2021-03-18 PROCEDURE — 120N000002 HC R&B MED SURG/OB UMMC

## 2021-03-18 PROCEDURE — 250N000011 HC RX IP 250 OP 636: Performed by: SURGERY

## 2021-03-18 PROCEDURE — 93306 TTE W/DOPPLER COMPLETE: CPT

## 2021-03-18 PROCEDURE — 250N000009 HC RX 250: Performed by: SURGERY

## 2021-03-18 PROCEDURE — 80048 BASIC METABOLIC PNL TOTAL CA: CPT | Performed by: SURGERY

## 2021-03-18 PROCEDURE — 83735 ASSAY OF MAGNESIUM: CPT | Performed by: SURGERY

## 2021-03-18 PROCEDURE — 71046 X-RAY EXAM CHEST 2 VIEWS: CPT | Mod: 26 | Performed by: RADIOLOGY

## 2021-03-18 PROCEDURE — 71046 X-RAY EXAM CHEST 2 VIEWS: CPT

## 2021-03-18 PROCEDURE — 85027 COMPLETE CBC AUTOMATED: CPT | Performed by: SURGERY

## 2021-03-18 RX ORDER — AMOXICILLIN 250 MG
1 CAPSULE ORAL 2 TIMES DAILY
Status: DISCONTINUED | OUTPATIENT
Start: 2021-03-18 | End: 2021-03-23 | Stop reason: HOSPADM

## 2021-03-18 RX ORDER — POLYETHYLENE GLYCOL 3350 17 G/17G
17 POWDER, FOR SOLUTION ORAL DAILY
Status: DISCONTINUED | OUTPATIENT
Start: 2021-03-18 | End: 2021-03-22

## 2021-03-18 RX ORDER — SERTRALINE HYDROCHLORIDE 100 MG/1
100 TABLET, FILM COATED ORAL EVERY MORNING
Status: DISCONTINUED | OUTPATIENT
Start: 2021-03-18 | End: 2021-03-23 | Stop reason: HOSPADM

## 2021-03-18 RX ADMIN — ACETAMINOPHEN 650 MG: 325 TABLET, FILM COATED ORAL at 20:33

## 2021-03-18 RX ADMIN — PANTOPRAZOLE SODIUM 40 MG: 40 TABLET, DELAYED RELEASE ORAL at 09:07

## 2021-03-18 RX ADMIN — OXYCODONE HYDROCHLORIDE 5 MG: 5 TABLET ORAL at 00:08

## 2021-03-18 RX ADMIN — LORAZEPAM 1 MG: 1 TABLET ORAL at 20:32

## 2021-03-18 RX ADMIN — SERTRALINE HYDROCHLORIDE 100 MG: 100 TABLET ORAL at 09:08

## 2021-03-18 RX ADMIN — Medication 12.5 MG: at 09:11

## 2021-03-18 RX ADMIN — ACETAMINOPHEN 650 MG: 325 TABLET, FILM COATED ORAL at 15:49

## 2021-03-18 RX ADMIN — OXYCODONE HYDROCHLORIDE 10 MG: 5 TABLET ORAL at 10:03

## 2021-03-18 RX ADMIN — ACETAMINOPHEN 975 MG: 325 TABLET, FILM COATED ORAL at 07:19

## 2021-03-18 RX ADMIN — HEPARIN SODIUM 5000 UNITS: 5000 INJECTION, SOLUTION INTRAVENOUS; SUBCUTANEOUS at 05:15

## 2021-03-18 RX ADMIN — SENNOSIDES AND DOCUSATE SODIUM 1 TABLET: 8.6; 5 TABLET ORAL at 09:08

## 2021-03-18 RX ADMIN — Medication: at 05:15

## 2021-03-18 RX ADMIN — OXYCODONE HYDROCHLORIDE 5 MG: 5 TABLET ORAL at 15:47

## 2021-03-18 RX ADMIN — TORSEMIDE 20 MG: 20 TABLET ORAL at 09:09

## 2021-03-18 RX ADMIN — POLYETHYLENE GLYCOL 3350 17 G: 17 POWDER, FOR SOLUTION ORAL at 09:07

## 2021-03-18 RX ADMIN — HYDROXYCHLOROQUINE SULFATE 200 MG: 200 TABLET, FILM COATED ORAL at 09:05

## 2021-03-18 RX ADMIN — SENNOSIDES AND DOCUSATE SODIUM 1 TABLET: 8.6; 5 TABLET ORAL at 20:28

## 2021-03-18 RX ADMIN — OXYCODONE HYDROCHLORIDE 10 MG: 5 TABLET ORAL at 07:19

## 2021-03-18 RX ADMIN — HEPARIN SODIUM 5000 UNITS: 5000 INJECTION, SOLUTION INTRAVENOUS; SUBCUTANEOUS at 20:26

## 2021-03-18 RX ADMIN — POLYETHYLENE GLYCOL 3350 17 G: 17 POWDER, FOR SOLUTION ORAL at 09:06

## 2021-03-18 ASSESSMENT — MIFFLIN-ST. JEOR: SCORE: 1341.88

## 2021-03-18 NOTE — PROGRESS NOTES
Cardiovascular Surgery Progress Note  03/18/2021  Surgeon: Dr Jang           Assessment and Plan:     Sissy Donaldson is a 35 year old female with a PMH of SLE with recurrent pericardial effusions and pericarditis and constrictive pericarditis now s/p complete percardiectomy with Dr. Jang on 3/15/21.     Cardiovascular:    s/p: Complete Pericardectomy 3/15/2, Off pump  - Echo:preop 55-60%  - Intraop MIKE: severe TR  - Follow up TTE 3/18- pending  - Chest tubes: med x 2 removed 3/17, Pleural tubes x 2 removed 3/18    Pulmonary:  Postop mechanical ventilation, resolved  - Extubated in the OR; Saturating well on RA 95%.   - Supplemental O2 PRN to keep sats > 92%. Wean off as tolerated.  - Pulm toilet, IS, activity and deep breathing     Neurology / MSK:  Postop pain  - Controlled with tylenol, IV Dilaudid, Robaxin, and oxycodone prn.  - Anxiety- ativan and atarax PRN  - Erector spinae catheter bilat-  removed 3/18     / Renal:  - No Hx of renal disease. Most recent creatinine 0.68, adequate UOP. WT stable  - Diuresis: PTA torsemide 20 mg daily, Aldactone 12.5 mg daily. No lasix today    GI / FEN:   Nutrition  - Regular diet, continue bowel regimen      Infectious Disease:  Stress induced leukocytosis  - WBC 8.0, remains afebrile, no signs or symptoms of infection  - Completed perioperative antibiotics    Hematology:   Acute blood loss anemia and thrombocytopenia  - Hgb 11.6; Plt 154, no signs or symptoms of active bleeding     Prophylaxis:   - Stress ulcer prophylaxis: Pantoprazole 40 mg daily for 30 days  - DVT prophylaxis: Subcutaneous heparin, SCD    Disposition:   - Transferred to  on POD 1  - Rehab recommending discharge to home Friday.      Discussed with CVTS Fellow as needed.  Staff surgeons have been informed of changes through both verbal and written communication.      Brii Hatch PA-C  Cardiothoracic Surgery  Pager [phone]            Subjective/Interval History:     No overnight events.States  "pain is well managed on current regimen. Slept well overnight.  Tolerating diet, is passing flatus, - BM. No nausea or vomiting.  Breathing well without complaints.   Working with therapies.   Denies chest pain, palpitations, dizziness, syncopal symptoms, fevers, chills, myalgias, or sternal popping/clicking.         Physical Exam:   Blood pressure 101/64, pulse 75, temperature 98  F (36.7  C), temperature source Axillary, resp. rate 17, height 1.651 m (5' 5\"), weight 64.6 kg (142 lb 6.7 oz), last menstrual period 03/01/2021, SpO2 100 %, not currently breastfeeding.  Vitals:    03/16/21 0330 03/17/21 0617 03/18/21 0515   Weight: 64 kg (141 lb 1.5 oz) 64.8 kg (142 lb 13.7 oz) 64.6 kg (142 lb 6.7 oz)      Weight; stable.   24 hr Fluid status; net loss 1L.   MAPs: 73-81    Gen: NAD, resting in bed  CV: RRR, S1S2 normal, no murmurs, rubs, or gallops. JVP not elevated  Pulm: clear to auscultation bilaterally, no rhonchi or wheezes  Abd: soft, non-tender, no guarding,  - BM  Ext: no lower extremity edema  Incision: clean, dry, intact, no erythema  Chest Tube sites: dressings clean and dry, serosanguinous, no air leak, minimal output Pl tubes. Removed 3/18. Meds (removed 3/17).  Neuro: grossly normal  Psych: calm, cooperative            Data:    Imaging:  reviewed recent imaging  Recent Results (from the past 24 hour(s))   XR Chest 2 Views    Narrative    Exam: XR CHEST 2 VW, 3/17/2021 11:40 AM    Indication: s/p medistinal chest tube removal x 2, Pleural x 2 intact    Comparison: 3/16/2021    Findings:   Bibasilar chest tubes remain. Mediastinal tubes have been removed.  Heart within normal limits. Mild elevation of the right hemidiaphragm.  Lungs are clear. Small pneumomediastinum seen anteriorly on the  lateral view.      Impression    Impression:   1. Mediastinal chest tubes have been removed. Small anterior  pneumomediastinum seen on the lateral view.  2. Bibasilar chest tubes remain without significant " pneumothorax.  3. No acute airspace disease identified.    BENJIE CRUZ MD         Labs:  GLUCOSE:   Recent Labs   Lab 03/18/21  0553 03/17/21  2340 03/17/21  1645 03/17/21  0626 03/16/21  2237 03/16/21  1634 03/16/21  1255 03/16/21  0341 03/15/21  1334 03/15/21  1334 03/15/21  1157 03/15/21  0843   GLC 96  --   --  101*  --   --   --  117*  --  124* 176* 97   BGM  --  87 78  --  98 102* 109* 125*   < >  --   --   --     < > = values in this interval not displayed.

## 2021-03-18 NOTE — PLAN OF CARE
Problem: Adult Inpatient Plan of Care  Goal: Optimal Comfort and Wellbeing  Outcome: Improving     Problem: Adult Inpatient Plan of Care  Goal: Readiness for Transition of Care  Outcome: Improving   Stable shift last evening. Up to chair with supervision. Moves around room well with slight increased pain. PRN pain medications given and effective. Planned echo today.

## 2021-03-18 NOTE — PROGRESS NOTES
[facility]AL ANESTHESIA PAIN SERVICE CONTINUOUS NERVE INFUSION NOTE  March 18, 2021      Sissy Donaldson is a 35 year old female with history of lupus with recurrdent pericardial effusions and pericarditis and constrictive pericarditis now  s/p percardiectomy with Dr. Jang on 3/15/21.   and placement of bilateral T5-6 erector spinae (ES) catheters for analgesia.      Subjective and Interval History: Overnight events: no acute event.  Seen at 0905 and noon .  Chest tubes were all removed this morning and Sissy feels pain is much better. Patient reports 3-4/10at rest. Adequate pain control with current nerve block infusion and analgesics (see below).  Ambulating without assistance,  denies weakness, paresthesias, circumoral numbness, metallic taste or tinnitus. Tolerating a regular diet, denies nausea.    Antithrombotic/Thrombolytic Therapy ordered:  heparin ANTICOAGULANT injection 5,000 Units SC, Q8H, last dose at 5am.     Analgesic Medications:   Medications related to Pain Management (From now, onward)    Start     Dose/Rate Route Frequency Ordered Stop    03/18/21 0000  acetaminophen (TYLENOL) tablet 650 mg      650 mg Oral or Feeding Tube EVERY 4 HOURS PRN 03/15/21 1325      03/16/21 1137  HYDROmorphone (PF) (DILAUDID) injection 0.3-0.5 mg      0.3-0.5 mg Intravenous EVERY 2 HOURS PRN 03/16/21 1145      03/16/21 1133  LORazepam (ATIVAN) tablet 1 mg      1 mg Oral EVERY 6 HOURS PRN 03/16/21 1145      03/15/21 2000  senna-docusate (SENOKOT-S/PERICOLACE) 8.6-50 MG per tablet 1 tablet      1 tablet Oral or Feeding Tube 2 TIMES DAILY 03/15/21 1325      03/15/21 1720  hydrOXYzine (ATARAX) tablet 25 mg      25 mg Oral EVERY 6 HOURS PRN 03/15/21 1721      03/15/21 1720  hydrOXYzine (ATARAX) tablet 50 mg      50 mg Oral EVERY 6 HOURS PRN 03/15/21 1721      03/15/21 1330  polyethylene glycol (MIRALAX) Packet 17 g      17 g Oral or Feeding Tube DAILY 03/15/21 1325      03/15/21 1324  oxyCODONE (ROXICODONE) tablet 5-10 mg       5-10 mg Oral or Feeding Tube EVERY 3 HOURS PRN 03/15/21 1325      03/15/21 1324  methocarbamol (ROBAXIN) tablet 750 mg      750 mg Oral or Feeding Tube 4 TIMES DAILY PRN 03/15/21 1325      03/15/21 0800  ropivacaine 0.2% in NS perineural infusion simple       Perineural Continuous Nerve Block 03/15/21 0746      03/15/21 0800  ropivacaine 0.2% in NS perineural infusion simple       Perineural Continuous Nerve Block 03/15/21 0746      03/15/21 0607  lidocaine (LMX4) cream       Topical EVERY 1 HOUR PRN 03/15/21 0607      03/15/21 0607  lidocaine 1 % 0.1-1 mL      0.1-1 mL Other EVERY 1 HOUR PRN 03/15/21 0607             Objective:  Lab results  Recent Labs   Lab Test 03/18/21  0553   WBC 8.0   RBC 3.95   HGB 11.6*   HCT 36.6   MCV 93   MCH 29.4   MCHC 31.7   RDW 12.9          Lab Results   Component Value Date    INR 1.49 03/15/2021    INR 1.32 03/02/2021    INR 1.26 01/12/2021       Vitals:    Temp:  [98  F (36.7  C)-98.5  F (36.9  C)] 98  F (36.7  C)  Pulse:  [66-75] 75  Resp:  [8-25] 17  BP: ()/(62-82) 101/64  SpO2:  [96 %-100 %] 100 %    Exam:   GEN: alert and no distress  NEURO/MSK:   Strength LE 5/5  and overall symmetric  SKIN: bilateral erector spinae (ES) catheter sites with dressing c/d/i, no tenderness, erythema, heme, edema      Assessment and Plan:     Patient is receiving moderate analgesia with current multimodal therapy including 0.2% ropivacaine PIB (programmed intermittent bolus) at 7 mL/hr bolus per catheter.  Motor function intact and is meeting activity goals.  No evidence of adverse side effects related to local anesthetic.     Catheter Day #3  bilateral T5-6 erector spinae (ES) catheters/PIB infusion:    AT noon, her ES catheters were removed without complications, dark tips intact, small band aids applied.       Antithrombotic/thrombolytic therapy:     heparin ANTICOAGULANT injection 5,000 Units SC, Q8H as ordered.as ordered-may resume this 1 hour after catheter removal. RN was  made aware.    Please contact RAPS, jobcode ID: [id number] prior to any medication changes         - discussed plan with attending anesthesiologist  -RAPS will sign off.    Ivana Rosales PA-C  [facility]  3/18/2021 8:00 AM    R[facility] Contact Info (24 hour job code pager is the last 4 digits) For in-house use only:   Job code ID: [id number]   [place] 0599  Peds [phone]  Sam phone: dial * * * [phone], enter jobcode ID, then enter call-back number.    Text: Use [employer] on the Intranet <Paging/Directory> tab and enter Jobcode ID.   If no call back at any time, contact the hospital  and ask for RAPS attending or backup

## 2021-03-18 NOTE — PLAN OF CARE
6D Defer PT Consult  PT orders acknowledged and appreciated. Per discussion with OT and chart review, pt ambulating in hallway and completing stairs SBA-IND. OT to follow for CR. PT to complete orders. Thank you for your referral.

## 2021-03-18 NOTE — PHARMACY-ADMISSION MEDICATION HISTORY
Admission medication history interview status for the 3/15/2021 admission is complete. See Epic admission navigator for allergy information, pharmacy, prior to admission medications and immunization status.     Medication history interview sources:  Patient Interview and Yesenia    Changes made to PTA medication list (reason)  Added: None    Deleted: None    Changed:   -- updated directions for hydroxychloroquine    Additional medication history information (including reliability of information, actions taken by pharmacist):None  -- patient reports taking spironolactone daily with last dose the morning before admission. However the fill history is a bit odd with last fill July 2020 for only a 90 days supply. Other prescriptions are being filled appropriately.       Prior to Admission medications    Medication Sig Last Dose Taking? Auth Provider   acetaminophen (TYLENOL) 325 MG tablet Take 325 mg by mouth every 6 hours as needed Past Month at Unknown time Yes Reported, Patient   cholecalciferol 50 MCG (2000 UT) tablet Take 2,000 Units by mouth every morning  3/14/2021 at Unknown time Yes Reported, Patient   hydroxychloroquine (PLAQUENIL) 200 MG tablet Take 200 mg by mouth every morning  3/15/2021 at 0500 Yes Reported, Patient   metroNIDAZOLE (METROCREAM) 0.75 % external cream as needed  3/14/2021 at Unknown time Yes Reported, Patient   sertraline (ZOLOFT) 100 MG tablet Take 100 mg by mouth every morning  3/15/2021 at 0500 Yes Reported, Patient   spironolactone (ALDACTONE) 25 MG tablet Take 12.5 mg by mouth every morning  Past Week at Unknown time Yes Reported, Patient   sulfacetamide sodium-sulfur 10-5 % LOTN as needed  Past Week at Unknown time Yes Reported, Patient   torsemide (DEMADEX) 10 MG tablet Take 20 mg by mouth every morning  Past Week at Unknown time Yes Reported, Patient         Medication history completed by:   Isaca Hyman, Pharm.D, BCPS

## 2021-03-19 ENCOUNTER — APPOINTMENT (OUTPATIENT)
Dept: GENERAL RADIOLOGY | Facility: CLINIC | Age: 36
DRG: 271 | End: 2021-03-19
Attending: PHYSICIAN ASSISTANT
Payer: COMMERCIAL

## 2021-03-19 ENCOUNTER — PREP FOR PROCEDURE (OUTPATIENT)
Dept: CARDIOLOGY | Facility: CLINIC | Age: 36
End: 2021-03-19

## 2021-03-19 ENCOUNTER — APPOINTMENT (OUTPATIENT)
Dept: OCCUPATIONAL THERAPY | Facility: CLINIC | Age: 36
DRG: 271 | End: 2021-03-19
Attending: SURGERY
Payer: COMMERCIAL

## 2021-03-19 ENCOUNTER — APPOINTMENT (OUTPATIENT)
Dept: CT IMAGING | Facility: CLINIC | Age: 36
DRG: 271 | End: 2021-03-19
Attending: NURSE PRACTITIONER
Payer: COMMERCIAL

## 2021-03-19 DIAGNOSIS — I07.1 TRICUSPID REGURGITATION: Primary | ICD-10-CM

## 2021-03-19 LAB
ANION GAP SERPL CALCULATED.3IONS-SCNC: 7 MMOL/L (ref 3–14)
BLD PROD TYP BPU: NORMAL
BLD PROD TYP BPU: NORMAL
BLD UNIT ID BPU: 0
BLD UNIT ID BPU: 0
BLOOD PRODUCT CODE: NORMAL
BLOOD PRODUCT CODE: NORMAL
BPU ID: NORMAL
BPU ID: NORMAL
BUN SERPL-MCNC: 6 MG/DL (ref 7–30)
CALCIUM SERPL-MCNC: 8.3 MG/DL (ref 8.5–10.1)
CHLORIDE SERPL-SCNC: 101 MMOL/L (ref 94–109)
CO2 SERPL-SCNC: 30 MMOL/L (ref 20–32)
CREAT SERPL-MCNC: 0.5 MG/DL (ref 0.52–1.04)
ERYTHROCYTE [DISTWIDTH] IN BLOOD BY AUTOMATED COUNT: 12.7 % (ref 10–15)
GFR SERPL CREATININE-BSD FRML MDRD: >90 ML/MIN/{1.73_M2}
GLUCOSE BLDC GLUCOMTR-MCNC: 132 MG/DL (ref 70–99)
GLUCOSE BLDC GLUCOMTR-MCNC: 88 MG/DL (ref 70–99)
GLUCOSE BLDC GLUCOMTR-MCNC: 94 MG/DL (ref 70–99)
GLUCOSE SERPL-MCNC: 94 MG/DL (ref 70–99)
HCT VFR BLD AUTO: 34.5 % (ref 35–47)
HGB BLD-MCNC: 11.1 G/DL (ref 11.7–15.7)
MAGNESIUM SERPL-MCNC: 1.9 MG/DL (ref 1.6–2.3)
MCH RBC QN AUTO: 29.2 PG (ref 26.5–33)
MCHC RBC AUTO-ENTMCNC: 32.2 G/DL (ref 31.5–36.5)
MCV RBC AUTO: 91 FL (ref 78–100)
PLATELET # BLD AUTO: 173 10E9/L (ref 150–450)
POTASSIUM SERPL-SCNC: 2.9 MMOL/L (ref 3.4–5.3)
POTASSIUM SERPL-SCNC: 3.8 MMOL/L (ref 3.4–5.3)
RBC # BLD AUTO: 3.8 10E12/L (ref 3.8–5.2)
SODIUM SERPL-SCNC: 138 MMOL/L (ref 133–144)
TRANSFUSION STATUS PATIENT QL: NORMAL
WBC # BLD AUTO: 5.3 10E9/L (ref 4–11)

## 2021-03-19 PROCEDURE — 84132 ASSAY OF SERUM POTASSIUM: CPT | Performed by: SURGERY

## 2021-03-19 PROCEDURE — 71275 CT ANGIOGRAPHY CHEST: CPT | Mod: 26 | Performed by: RADIOLOGY

## 2021-03-19 PROCEDURE — 250N000011 HC RX IP 250 OP 636: Performed by: NURSE PRACTITIONER

## 2021-03-19 PROCEDURE — 36415 COLL VENOUS BLD VENIPUNCTURE: CPT | Performed by: SURGERY

## 2021-03-19 PROCEDURE — 80048 BASIC METABOLIC PNL TOTAL CA: CPT | Performed by: SURGERY

## 2021-03-19 PROCEDURE — 250N000013 HC RX MED GY IP 250 OP 250 PS 637: Performed by: NURSE PRACTITIONER

## 2021-03-19 PROCEDURE — 71046 X-RAY EXAM CHEST 2 VIEWS: CPT

## 2021-03-19 PROCEDURE — 250N000013 HC RX MED GY IP 250 OP 250 PS 637: Performed by: PHYSICIAN ASSISTANT

## 2021-03-19 PROCEDURE — 99221 1ST HOSP IP/OBS SF/LOW 40: CPT | Mod: GC | Performed by: INTERNAL MEDICINE

## 2021-03-19 PROCEDURE — 71275 CT ANGIOGRAPHY CHEST: CPT

## 2021-03-19 PROCEDURE — 250N000013 HC RX MED GY IP 250 OP 250 PS 637: Performed by: SURGERY

## 2021-03-19 PROCEDURE — 83735 ASSAY OF MAGNESIUM: CPT | Performed by: SURGERY

## 2021-03-19 PROCEDURE — 250N000011 HC RX IP 250 OP 636: Performed by: SURGERY

## 2021-03-19 PROCEDURE — 120N000002 HC R&B MED SURG/OB UMMC

## 2021-03-19 PROCEDURE — 250N000011 HC RX IP 250 OP 636: Performed by: RADIOLOGY

## 2021-03-19 PROCEDURE — 999N001017 HC STATISTIC GLUCOSE BY METER IP

## 2021-03-19 PROCEDURE — 999N000127 HC STATISTIC PERIPHERAL IV START W US GUIDANCE

## 2021-03-19 PROCEDURE — 85027 COMPLETE CBC AUTOMATED: CPT | Performed by: SURGERY

## 2021-03-19 PROCEDURE — 97535 SELF CARE MNGMENT TRAINING: CPT | Mod: GO

## 2021-03-19 PROCEDURE — 71046 X-RAY EXAM CHEST 2 VIEWS: CPT | Mod: 26 | Performed by: RADIOLOGY

## 2021-03-19 RX ORDER — SPIRONOLACTONE 25 MG/1
25 TABLET ORAL DAILY
Status: DISCONTINUED | OUTPATIENT
Start: 2021-03-20 | End: 2021-03-23 | Stop reason: HOSPADM

## 2021-03-19 RX ORDER — ACETAMINOPHEN 325 MG/1
975 TABLET ORAL EVERY 8 HOURS
Status: DISCONTINUED | OUTPATIENT
Start: 2021-03-19 | End: 2021-03-23 | Stop reason: HOSPADM

## 2021-03-19 RX ORDER — POTASSIUM CHLORIDE 750 MG/1
20 TABLET, EXTENDED RELEASE ORAL ONCE
Status: COMPLETED | OUTPATIENT
Start: 2021-03-19 | End: 2021-03-19

## 2021-03-19 RX ORDER — IOPAMIDOL 755 MG/ML
54 INJECTION, SOLUTION INTRAVASCULAR ONCE
Status: COMPLETED | OUTPATIENT
Start: 2021-03-19 | End: 2021-03-19

## 2021-03-19 RX ORDER — MAGNESIUM SULFATE HEPTAHYDRATE 40 MG/ML
2 INJECTION, SOLUTION INTRAVENOUS ONCE
Status: COMPLETED | OUTPATIENT
Start: 2021-03-19 | End: 2021-03-19

## 2021-03-19 RX ORDER — POTASSIUM CHLORIDE 750 MG/1
40 TABLET, EXTENDED RELEASE ORAL ONCE
Status: COMPLETED | OUTPATIENT
Start: 2021-03-19 | End: 2021-03-19

## 2021-03-19 RX ORDER — FUROSEMIDE 10 MG/ML
40 INJECTION INTRAMUSCULAR; INTRAVENOUS
Status: DISCONTINUED | OUTPATIENT
Start: 2021-03-19 | End: 2021-03-20

## 2021-03-19 RX ORDER — POTASSIUM CHLORIDE 750 MG/1
20 TABLET, EXTENDED RELEASE ORAL 3 TIMES DAILY
Status: DISCONTINUED | OUTPATIENT
Start: 2021-03-19 | End: 2021-03-21

## 2021-03-19 RX ADMIN — POTASSIUM CHLORIDE 40 MEQ: 750 TABLET, EXTENDED RELEASE ORAL at 12:57

## 2021-03-19 RX ADMIN — FUROSEMIDE 40 MG: 10 INJECTION, SOLUTION INTRAVENOUS at 15:13

## 2021-03-19 RX ADMIN — SENNOSIDES AND DOCUSATE SODIUM 1 TABLET: 8.6; 5 TABLET ORAL at 08:11

## 2021-03-19 RX ADMIN — OXYCODONE HYDROCHLORIDE 5 MG: 5 TABLET ORAL at 13:49

## 2021-03-19 RX ADMIN — OXYCODONE HYDROCHLORIDE 5 MG: 5 TABLET ORAL at 08:21

## 2021-03-19 RX ADMIN — POTASSIUM CHLORIDE 20 MEQ: 750 TABLET, EXTENDED RELEASE ORAL at 15:14

## 2021-03-19 RX ADMIN — ACETAMINOPHEN 975 MG: 325 TABLET, FILM COATED ORAL at 20:25

## 2021-03-19 RX ADMIN — IOPAMIDOL 54 ML: 755 INJECTION, SOLUTION INTRAVENOUS at 15:04

## 2021-03-19 RX ADMIN — HEPARIN SODIUM 5000 UNITS: 5000 INJECTION, SOLUTION INTRAVENOUS; SUBCUTANEOUS at 11:50

## 2021-03-19 RX ADMIN — HEPARIN SODIUM 5000 UNITS: 5000 INJECTION, SOLUTION INTRAVENOUS; SUBCUTANEOUS at 20:26

## 2021-03-19 RX ADMIN — FUROSEMIDE 40 MG: 10 INJECTION, SOLUTION INTRAVENOUS at 08:11

## 2021-03-19 RX ADMIN — ACETAMINOPHEN 975 MG: 325 TABLET, FILM COATED ORAL at 15:14

## 2021-03-19 RX ADMIN — PANTOPRAZOLE SODIUM 40 MG: 40 TABLET, DELAYED RELEASE ORAL at 08:11

## 2021-03-19 RX ADMIN — POTASSIUM CHLORIDE 20 MEQ: 750 TABLET, EXTENDED RELEASE ORAL at 15:13

## 2021-03-19 RX ADMIN — SERTRALINE HYDROCHLORIDE 100 MG: 100 TABLET ORAL at 08:12

## 2021-03-19 RX ADMIN — SENNOSIDES AND DOCUSATE SODIUM 1 TABLET: 8.6; 5 TABLET ORAL at 20:26

## 2021-03-19 RX ADMIN — POTASSIUM CHLORIDE 20 MEQ: 750 TABLET, EXTENDED RELEASE ORAL at 20:25

## 2021-03-19 RX ADMIN — OXYCODONE HYDROCHLORIDE 5 MG: 5 TABLET ORAL at 20:25

## 2021-03-19 RX ADMIN — HYDROXYCHLOROQUINE SULFATE 200 MG: 200 TABLET, FILM COATED ORAL at 08:11

## 2021-03-19 RX ADMIN — MAGNESIUM SULFATE IN WATER 2 G: 40 INJECTION, SOLUTION INTRAVENOUS at 12:57

## 2021-03-19 RX ADMIN — Medication 12.5 MG: at 08:12

## 2021-03-19 RX ADMIN — POLYETHYLENE GLYCOL 3350 17 G: 17 POWDER, FOR SOLUTION ORAL at 08:12

## 2021-03-19 ASSESSMENT — MIFFLIN-ST. JEOR: SCORE: 1367.2

## 2021-03-19 NOTE — OP NOTE
Procedure Date: 03/15/2021      PREOPERATIVE DIAGNOSES:  Constrictive pericarditis.      POSTOPERATIVE DIAGNOSIS:  Constrictive pericarditis with moderate tricuspid regurgitation.      OPERATIVE PROCEDURES:  Complete pericardectomy with cardiopulmonary bypass standby.      SURGEON:  Kirit Jang MD      COSURGEON:  Ac James MD      NOTE:  A second attending surgeon was requested for the operation because of complexity of the case as well as the absence of any qualified resident or fellow.      ASSISTANT SURGEON:  Kayode Avila MD      OPERATIVE INDICATIONS:  The patient is a 35-year-old female who was diagnosed to have severe constrictive pericarditis with significant symptoms of fluid overload and increasing diuretic requirement.  A decision was made to proceed with pericardiectomy.  I discussed benefits of the surgery with the patient including risk of death, stroke, bleeding, infection, renal failure, arrhythmias.  She understands and wanted to proceed with the above.      OPERATIVE FINDINGS:  The patient's sternum was of adequate quality.  The pericardium was extremely thickened and encasing the entire heart.      DESCRIPTION OF PROCEDURE:  The patient was brought to operating room in stable condition for the emergent general anesthesia, the patient's chest, abdomen and lower extremities were prepped and draped in the usual manner.  Median sternotomy was performed with the oscillating saw.  Next, the pericardium was carefully dissected off the midline.  We continued to do this to both phrenic nerves, completely removing the pericardium off the right atrium, right ventricle and left ventricle in its entirety.  We used a combination of electrocautery and Metzenbaum scissors to completely free up and excise the pericardium.  We were also able to free the heart from the pericardium under the oblique sinus.  We also excised a large portion of the pericardium inferior to the phrenic nerve on the left  side.  It should be important to note that both phrenic nerves were identified and preserved in their entirety.  We also freed up the entire pericardium from the inferolateral surface heart extending all the way to both left and right superior pulmonary veins.  We then proceeded to remove the pericardium off the diaphragmatic surface of the heart and excised it all the way to the left inferior pulmonary vein and down to the level of the oblique sinus.  There was a marked improvement in cardiac hemodynamics and the CVP decreased from 21 to 13 mmHg pressure at the end of the procedure.  However, an intraoperative echo at the end of procedure showed at least moderate tricuspid regurgitation.  We decided to observe this.  I did discuss this with her cardiologist, Dr. Xuan Parikh and we made a decision to repeat an echo in few days and leave this alone.  We then performed meticulous hemostasis of all edges to make sure there was no bleeding.  We placed 2 anterior mediastinal chest tubes and bilateral pleural chest tubes.  Sternum was closed with surgical steel wires.  Fascia, subcutaneous tissue, skin of chest were closed in layers.  The patient transferred to ICU in stable but critical condition.         ALFONSO SALAMANCA MD             D: 2021   T: 2021   MT: JANNIE      Name:     JOVAN GARNETT   MRN:      [id number]        Account:        [id number]   :      1985           Procedure Date: 03/15/2021      Document: [id number]       cc: Patria Garcia MD

## 2021-03-19 NOTE — CONSULTS
Cardiology Consult                                                               2021  Sissy Donaldson MRN: [id number]  Age: 35 year old, : 1985      Reason for consult:      Severe TR post pericardiectomy      Assessment and Recommendation:     Sissy Donaldson is a 35 year old female with a PMHx of SLE with recurrent pericardial effusions and constrictive pericarditis s/p complete pericardiectomy with Dr. Jang on 3/15/21. Post-operative TTE 3/18/2021 revealed severe TR w/ moderate RV dilation and mild RVSD. Cardiology consulted for further recommendations regarding new onset TR.     The patient's pre-procedure TTE (2/10/2021) showed normal LV function, normal RV function/size, trace TR and findings consistent with restrictive cardiomyopathy. Post-op TTE (3/18/2021) showed normal LV function, Moderate RV dilation w/ mild RVSD and new onset severe tricuspid insufficiency.     # Constrictive pericarditis s/p complete pericardiectomy  # New onset TR  # Moderate RV dilation w/ mild RVSD      On review of literature, case reports w/ similar findings of RV dilation w/ new (or worsening) TR noted post-pericardiectomy. This has been hypothesized to be due to RV enlargement after sudden pericardial release, due to altered ventricular compliance as well as myocardial atrophy [1]. Will obtain the structural cardiology team's opinion with regard to the timeline of improvement in such functional TR and the need for tricuspid valve repair.    - Meanwhile, consider CTA chest to r/o PE.  - Agree with IV diuresis 40 mg IV BID.  - Agree w/ limited TTE after diuresis to reassess TR & RV function.       Patient discussed with staff attending, Dr. Herrera and the note reflects our joint plan. Thank you for consulting the cardiovascular services at the [facility]. Please do not hesitate to call with questions or concerns.     Mariaa Almanza MD,   Cardiovascular Disease  Fellow  Pager [phone]    1. https://pubmed.ncbi.nlm.nih.gov/4049532/        History of Present Illness:     Sissy Donaldson is a 35 year old female with a PMHx of SLE with recurrent pericardial effusions and constrictive pericarditis s/p complete pericardiectomy with Dr. Jang on 3/15/21. Post-operative TTE 3/18/2021 revealed severe TR w/ moderate RV dilation and mild RVSD. Cardiology consulted for further recommendations regarding new onset TR.    On assessment today, the patient reported sharp chest pain w/ deep inspiration & SOB while laying flat, denied pedal edema, palpitations, presyncope, syncope.    Current medications: IV Lasix, aldactone 25 daily  Vitals:    03/15/21 0551 03/16/21 0330 03/17/21 0617 03/18/21 0515   Weight: 63.5 kg (139 lb 15.9 oz) 64 kg (141 lb 1.5 oz) 64.8 kg (142 lb 13.7 oz) 64.6 kg (142 lb 6.7 oz)    03/19/21 0500   Weight: 67.1 kg (148 lb)       A 13-point ROS is negative except as mentioned above      Past Medical History:     Patient Active Problem List   Diagnosis     Hydrosalpinx     Chronic diastolic congestive heart failure (H)     SANTIAGO (generalized anxiety disorder)     History of ITP     Lack of immunity to hepatitis B virus demonstrated by serologic test     Other ascites     Pericardial effusion     Pericarditis     Recurrent major depressive disorder in partial remission (H)     Systemic lupus erythematosus (H)     Restrictive cardiomyopathy (H)     Status post coronary angiogram     History of pericardiectomy     Constrictive pericarditis     Tricuspid regurgitation         Past Surgical History:      Past Surgical History:   Procedure Laterality Date     CV CORONARY ANGIOGRAM N/A 1/12/2021    Procedure: CV CORONARY ANGIOGRAM;  Surgeon: Justin Ortega MD;  [facility]     CV LEFT HEART CATH N/A 1/12/2021    Procedure: CV LEFT HEART CATH;  Surgeon: Justin Ortega MD;  [facility]     CV RIGHT HEART CATH  MEASUREMENTS RECORDED N/A 1/12/2021    Procedure: CV RIGHT HEART CATH;  Surgeon: Justin Ortega MD;  [facility]     PERICARDIOTOMY N/A 3/15/2021    Procedure: Median Sternotomy, Complete Pericardectomy, Cardiopulmonary Bypass Pump on Standby, Transesophageal Echocardiogram by Anesthesia.;  Surgeon: Kirit Jang MD;  Location:  OR     wisdom teeth           Social History:     Social History     Socioeconomic History     Marital status:      Spouse name: Not on file     Number of children: Not on file     Years of education: Not on file     Highest education level: Not on file   Occupational History     Not on file   Social Needs     Financial resource strain: Not on file     Food insecurity     Worry: Not on file     Inability: Not on file     Transportation needs     Medical: Not on file     Non-medical: Not on file   Tobacco Use     Smoking status: Never Smoker     Smokeless tobacco: Never Used   Substance and Sexual Activity     Alcohol use: Yes     Alcohol/week: 0.0 standard drinks     Comment: rarely      Drug use: No     Sexual activity: Not on file   Lifestyle     Physical activity     Days per week: Not on file     Minutes per session: Not on file     Stress: Not on file   Relationships     Social connections     Talks on phone: Not on file     Gets together: Not on file     Attends Bahai service: Not on file     Active member of club or organization: Not on file     Attends meetings of clubs or organizations: Not on file     Relationship status: Not on file     Intimate partner violence     Fear of current or ex partner: Not on file     Emotionally abused: Not on file     Physically abused: Not on file     Forced sexual activity: Not on file   Other Topics Concern     Not on file   Social History Narrative     Not on file         Family History:     Family History   Problem Relation Age of Onset     Breast Cancer Paternal Grandmother      Heart Disease Maternal  "Grandfather          Allergies:     Allergies   Allergen Reactions     Sulfamethoxazole-Trimethoprim Rash     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   acetaminophen (TYLENOL) 325 MG tablet, Take 325 mg by mouth every 6 hours as needed  cholecalciferol 50 MCG (2000 UT) tablet, Take 2,000 Units by mouth every morning   hydroxychloroquine (PLAQUENIL) 200 MG tablet, Take 200 mg by mouth every morning   metroNIDAZOLE (METROCREAM) 0.75 % external cream, as needed   sertraline (ZOLOFT) 100 MG tablet, Take 100 mg by mouth every morning   spironolactone (ALDACTONE) 25 MG tablet, Take 12.5 mg by mouth every morning   sulfacetamide sodium-sulfur 10-5 % LOTN, as needed   torsemide (DEMADEX) 10 MG tablet, Take 20 mg by mouth every morning             Physical Exam:     /65 (BP Location: Left arm)   Pulse 89   Temp 97.9  F (36.6  C) (Oral)   Resp 18   Ht 1.651 m (5' 5\")   Wt 67.1 kg (148 lb)   LMP 03/01/2021   SpO2 98%   BMI 24.63 kg/m      Wt Readings from Last 4 Encounters:   03/19/21 67.1 kg (148 lb)   03/02/21 65.8 kg (145 lb)   01/19/21 64.9 kg (143 lb)   01/12/21 66.3 kg (146 lb 2.6 oz)         Intake/Output Summary (Last 24 hours) at 3/19/2021 1047  Last data filed at 3/19/2021 0900  Gross per 24 hour   Intake --   Output 700 ml   Net -700 ml       Gen: AA&Ox3, no acute distress  HEENT: EOM grossly intact, mucous membranes pink, moist without plaque or exudate  PULM/THORAX: Clear to auscultation bilaterally, no rales/rhonchi/wheezes; JVP elevated 12-14 mm Hg.  CV:RRR, S1 and S2 appreciated, loud PSM in tricuspid area  ABD: obese, soft, nontender,  mildly distended. Normoactive bowel sounds  EXT: No edema, clubbing or cyanosis.   PSYCH: appropriate affect and mentation.       Data:     Labs Reviewed on " Admission    Troponin No results found for: TROPI, TROPONIN, TROPR, TROPN  BMP  Recent Labs   Lab 03/19/21  0716 03/18/21  0553 03/17/21  0626 03/16/21  0341    134 131* 136   POTASSIUM 2.9* 3.7 3.8 4.4   CHLORIDE 101 98 97 101   BARTOLO 8.3* 8.8 8.8 8.6   CO2 30 28 27 27   BUN 6* 10 14 12   CR 0.50* 0.68 0.75 0.66   GLC 94 96 101* 117*     CBC  Recent Labs   Lab 03/19/21  0716 03/18/21  0553 03/17/21  0626 03/16/21  0341   WBC 5.3 8.0 12.2* 13.2*   RBC 3.80 3.95 4.14 4.34   HGB 11.1* 11.6* 12.6 12.7   HCT 34.5* 36.6 38.9 40.0   MCV 91 93 94 92   MCH 29.2 29.4 30.4 29.3   MCHC 32.2 31.7 32.4 31.8   RDW 12.7 12.9 13.2 13.0    154 169 183     INR  Recent Labs   Lab 03/15/21  1334   INR 1.49*      Hepatic Panel   Lab Results   Component Value Date    AST 26 03/16/2021     Lab Results   Component Value Date    ALT 16 03/16/2021     No results found for: BILICONJ   Lab Results   Component Value Date    BILITOTAL 0.9 03/16/2021     Lab Results   Component Value Date    ALBUMIN 3.4 03/16/2021     Lab Results   Component Value Date    PROTTOTAL 6.0 03/16/2021      Lab Results   Component Value Date    ALKPHOS 59 03/16/2021

## 2021-03-19 NOTE — PROGRESS NOTES
Cardiovascular Surgery Progress Note  03/19/2021  Surgeon: Dr Jang           Assessment and Plan:     Sissy Donaldson is a 35 year old female with a PMH of SLE with recurrent pericardial effusions and pericarditis and constrictive pericarditis now s/p complete percardiectomy with Dr. Jang on 3/15/21.     Cardiovascular:    s/p: Complete Pericardectomy 3/15/2, Off pump  - Echo:preop 55-60%  - Intraop MIKE: severe TR  - Follow up TTE 3/18- shows severe TR with dilated IVC. Reviewed with Dr. Jang.   - Give IV lasix 40 mg BID with net goal of -1L, plan to re-echo Sunday or Monday pending diuresis to reassess TV. May need TV repair.   - Chest tubes: med x 2 removed 3/17, Pleural tubes x 2 removed 3/18  - CXR pending     Pulmonary:  Postop mechanical ventilation, resolved  - Extubated in the OR; Saturating well on RA 95%.   - Supplemental O2 PRN to keep sats > 92%. Wean off as tolerated.  - Pulm toilet, IS, activity and deep breathing     Neurology / MSK:  Postop pain  - Controlled with tylenol, IV Dilaudid, Robaxin, and oxycodone prn.  - Anxiety- ativan and atarax PRN  - Erector spinae catheter bilat-  removed 3/18     / Renal:  Hypokalemia   - No Hx of renal disease. Most recent creatinine 0.5 adequate UOP. WT stable  - Diuresis: PTA torsemide 20 mg daily, Aldactone 12.5 mg daily.   - plan to diurese over next couple days with net goal -1L/day, as above   - Give scheduled potassium 20 mEq TID today     GI / FEN:   Nutrition  - Regular diet, continue bowel regimen      Infectious Disease:  Stress induced leukocytosis  - WBC 5.3, remains afebrile, no signs or symptoms of infection  - Completed perioperative antibiotics    Hematology:   Acute blood loss anemia and thrombocytopenia  - Hgb 11.1; Plt 173, no signs or symptoms of active bleeding     Prophylaxis:   - Stress ulcer prophylaxis: Pantoprazole 40 mg daily for 30 days  - DVT prophylaxis: Subcutaneous heparin, SCD    Disposition:   - Transferred to  on POD  "1  - Discharge to home pending diuresis and repeat echo, possibly Monday      Discussed with CVTS Fellow as needed.  Staff surgeons have been informed of changes through both verbal and written communication.      Zahra Segura DNP, CNP  [facility] Cardiovascular Thoracic Surgery   O: [phone]  Pgr [phone]            Subjective/Interval History:     No overnight events.States pain is well managed on current regimen. Slept well overnight.  Tolerating diet, is passing flatus, - BM. No nausea or vomiting.  Breathing well without complaints.   Working with therapies.   Denies chest pain, palpitations, dizziness, syncopal symptoms, fevers, chills, myalgias, or sternal popping/clicking.         Physical Exam:   Blood pressure 95/64, pulse 83, temperature 98.4  F (36.9  C), resp. rate 26, height 1.651 m (5' 5\"), weight 67.1 kg (148 lb), last menstrual period 03/01/2021, SpO2 99 %, not currently breastfeeding.  Vitals:    03/17/21 0617 03/18/21 0515 03/19/21 0500   Weight: 64.8 kg (142 lb 13.7 oz) 64.6 kg (142 lb 6.7 oz) 67.1 kg (148 lb)      Weight; stable.   24 hr Fluid status; net loss 1L.   MAPs: 73-81    Gen: NAD, resting in bed  CV: RRR, S1S2 normal, no murmurs, rubs, or gallops. JVP not elevated  Pulm: clear to auscultation bilaterally, no rhonchi or wheezes  Abd: soft, non-tender, no guarding, feels bloated, + BM  Ext: trace lower extremity edema  Incision: clean, dry, intact, no erythema  Chest Tube sites: dressings clean and dry, serosanguinous, no air leak, minimal output Pl tubes. Removed 3/18. Meds (removed 3/17).  Neuro: grossly normal  Psych: calm, cooperative            Data:    Imaging:  reviewed recent imaging  Recent Results (from the past 24 hour(s))   XR Chest 2 Views    Narrative    Exam: XR CHEST 2 VW, 3/18/2021 12:22 PM    Indication: s/p chest tube removal, Pleurals x 2    Comparison: 3/17/2021    Findings:   2 views of the chest are obtained demonstrating unremarkable soft  tissues and no acute " bony abnormalities. Median sternotomy wires are  intact. Bilateral chest tubes have been removed. Cardiomediastinal  borders are clear. No enlargement of the cardiac silhouette. No  appreciable pneumothorax or pleural effusions. No focal airspace  opacities.      Impression    Impression: No acute cardiac or pulmonary abnormalities.    I have personally reviewed the examination and initial interpretation  and I agree with the findings.    ABRAHAM ARCOS MD   Echo Complete    Narrative    [id number]  [id number]  [id number]  [id number]^GUSTAVO^ALEXIS^YIN           [facility],[place]  Echocardiography Laboratory  [address] [place] [id number]     Name: JOVAN GARNETT  MRN: [id number]  : 1985  Study Date: 2021 01:27 PM  Age: 35 yrs  Gender: Female  Patient Location: [facility], [place]  Reason For Study: Pericardial Effusion  Ordering Physician: ALEXIS CHEN  Referring Physician: LAFONSO SALAMANCA  Performed By: Crissy Graham RDCS     BSA: 1.7 m2  Height: 65 in  Weight: 142 lb  HR: 82  BP: 98/56 mmHg  _____________________________________________________________________________  __        Procedure  Complete Portable Echo Adult.  _____________________________________________________________________________  __        Interpretation Summary  s/p pericardiectomy. No pericardial effusion is present.  Severe tricuspid insufficiency is present.  Moderate right ventricular dilation is present. Global right ventricular  function is mildly reduced.  Global and regional left ventricular function is normal with an EF of 55-60%.  Flattened septum is consistent with right ventricular volume overload.  IVC diameter >2.1 cm collapsing <50% with sniff suggests a high RA pressure  estimated at 15 mmHg or greater.     Compared to prior study, severe TR is new. This is likely from RA/RV  enlargement following  pericardiectomy.  _____________________________________________________________________________  __        Left Ventricle  Global and regional left ventricular function is normal with an EF of 55-60%.  Left ventricular size is normal. Left ventricular wall thickness is normal.  Left ventricular diastolic function is indeterminate. Flattened septum is  consistent with right ventricular volume overload.     Right Ventricle  Moderate right ventricular dilation is present. Global right ventricular  function is mildly reduced.     Atria  The left atrium appears normal. Severe right atrial enlargement is present.     Mitral Valve  The mitral valve is normal. Trace mitral insufficiency is present.        Aortic Valve  Aortic valve is normal in structure and function.     Tricuspid Valve  The right ventricular systolic pressure is approximated at 17.7 mmHg plus the  right atrial pressure. Severe tricuspid insufficiency is present.     Pulmonic Valve  The pulmonic valve is normal.     Vessels  The aorta root is normal. IVC diameter >2.1 cm collapsing <50% with sniff  suggests a high RA pressure estimated at 15 mmHg or greater.     Pericardium  No pericardial effusion is present.     _____________________________________________________________________________  __  MMode/2D Measurements & Calculations  TAPSE: 0.66 cm        Doppler Measurements & Calculations  TR max tracey: 210.2 cm/sec  TR max P.7 mmHg        _____________________________________________________________________________  __           Report approved by: Shelly Ureña 2021 04:56 PM            Labs:  GLUCOSE:   Recent Labs   Lab 21  0716 21  0553 21  2340 21  1645 21  0626 21  2237 21  1634 21  1255 21  0341 03/15/21  1334 03/15/21  1334 03/15/21  1157   GLC 94 96  --   --  101*  --   --   --  117*  --  124* 176*   BGM  --   --  87 78  --  98 102* 109* 125*   < >  --   --     < > = values in  this interval not displayed.

## 2021-03-20 LAB
ANION GAP SERPL CALCULATED.3IONS-SCNC: 5 MMOL/L (ref 3–14)
BACTERIA SPEC CULT: NO GROWTH
BUN SERPL-MCNC: 7 MG/DL (ref 7–30)
CALCIUM SERPL-MCNC: 8.8 MG/DL (ref 8.5–10.1)
CHLORIDE SERPL-SCNC: 102 MMOL/L (ref 94–109)
CO2 SERPL-SCNC: 29 MMOL/L (ref 20–32)
CREAT SERPL-MCNC: 0.56 MG/DL (ref 0.52–1.04)
ERYTHROCYTE [DISTWIDTH] IN BLOOD BY AUTOMATED COUNT: 12.8 % (ref 10–15)
GFR SERPL CREATININE-BSD FRML MDRD: >90 ML/MIN/{1.73_M2}
GLUCOSE SERPL-MCNC: 101 MG/DL (ref 70–99)
HCT VFR BLD AUTO: 36.2 % (ref 35–47)
HGB BLD-MCNC: 11.8 G/DL (ref 11.7–15.7)
MAGNESIUM SERPL-MCNC: 2.1 MG/DL (ref 1.6–2.3)
MCH RBC QN AUTO: 30.2 PG (ref 26.5–33)
MCHC RBC AUTO-ENTMCNC: 32.6 G/DL (ref 31.5–36.5)
MCV RBC AUTO: 93 FL (ref 78–100)
PLATELET # BLD AUTO: 207 10E9/L (ref 150–450)
POTASSIUM SERPL-SCNC: 3.7 MMOL/L (ref 3.4–5.3)
RBC # BLD AUTO: 3.91 10E12/L (ref 3.8–5.2)
SODIUM SERPL-SCNC: 136 MMOL/L (ref 133–144)
SPECIMEN SOURCE: NORMAL
WBC # BLD AUTO: 6 10E9/L (ref 4–11)

## 2021-03-20 PROCEDURE — 250N000011 HC RX IP 250 OP 636: Performed by: NURSE PRACTITIONER

## 2021-03-20 PROCEDURE — 250N000013 HC RX MED GY IP 250 OP 250 PS 637: Performed by: SURGERY

## 2021-03-20 PROCEDURE — 85027 COMPLETE CBC AUTOMATED: CPT | Performed by: SURGERY

## 2021-03-20 PROCEDURE — 250N000013 HC RX MED GY IP 250 OP 250 PS 637: Performed by: PHYSICIAN ASSISTANT

## 2021-03-20 PROCEDURE — 250N000013 HC RX MED GY IP 250 OP 250 PS 637: Performed by: NURSE PRACTITIONER

## 2021-03-20 PROCEDURE — 214N000001 HC R&B CCU UMMC

## 2021-03-20 PROCEDURE — 250N000011 HC RX IP 250 OP 636: Performed by: SURGERY

## 2021-03-20 PROCEDURE — 83735 ASSAY OF MAGNESIUM: CPT | Performed by: SURGERY

## 2021-03-20 PROCEDURE — 80048 BASIC METABOLIC PNL TOTAL CA: CPT | Performed by: SURGERY

## 2021-03-20 PROCEDURE — 36415 COLL VENOUS BLD VENIPUNCTURE: CPT | Performed by: SURGERY

## 2021-03-20 RX ORDER — BISACODYL 10 MG
10 SUPPOSITORY, RECTAL RECTAL DAILY PRN
Status: DISCONTINUED | OUTPATIENT
Start: 2021-03-20 | End: 2021-03-23 | Stop reason: HOSPADM

## 2021-03-20 RX ORDER — FUROSEMIDE 10 MG/ML
40 INJECTION INTRAMUSCULAR; INTRAVENOUS 3 TIMES DAILY
Status: COMPLETED | OUTPATIENT
Start: 2021-03-20 | End: 2021-03-20

## 2021-03-20 RX ORDER — POTASSIUM CHLORIDE 750 MG/1
20 TABLET, EXTENDED RELEASE ORAL ONCE
Status: COMPLETED | OUTPATIENT
Start: 2021-03-20 | End: 2021-03-20

## 2021-03-20 RX ADMIN — METHOCARBAMOL 750 MG: 750 TABLET, FILM COATED ORAL at 13:56

## 2021-03-20 RX ADMIN — POLYETHYLENE GLYCOL 3350 17 G: 17 POWDER, FOR SOLUTION ORAL at 08:31

## 2021-03-20 RX ADMIN — FUROSEMIDE 40 MG: 10 INJECTION, SOLUTION INTRAVENOUS at 13:58

## 2021-03-20 RX ADMIN — SENNOSIDES AND DOCUSATE SODIUM 1 TABLET: 8.6; 5 TABLET ORAL at 08:32

## 2021-03-20 RX ADMIN — OXYCODONE HYDROCHLORIDE 5 MG: 5 TABLET ORAL at 19:13

## 2021-03-20 RX ADMIN — OXYCODONE HYDROCHLORIDE 5 MG: 5 TABLET ORAL at 05:14

## 2021-03-20 RX ADMIN — POTASSIUM CHLORIDE 20 MEQ: 750 TABLET, EXTENDED RELEASE ORAL at 13:56

## 2021-03-20 RX ADMIN — POTASSIUM CHLORIDE 20 MEQ: 750 TABLET, EXTENDED RELEASE ORAL at 19:45

## 2021-03-20 RX ADMIN — SPIRONOLACTONE 25 MG: 25 TABLET ORAL at 08:32

## 2021-03-20 RX ADMIN — FUROSEMIDE 40 MG: 10 INJECTION, SOLUTION INTRAVENOUS at 08:31

## 2021-03-20 RX ADMIN — ACETAMINOPHEN 975 MG: 325 TABLET, FILM COATED ORAL at 13:56

## 2021-03-20 RX ADMIN — FUROSEMIDE 40 MG: 10 INJECTION, SOLUTION INTRAVENOUS at 19:45

## 2021-03-20 RX ADMIN — HEPARIN SODIUM 5000 UNITS: 5000 INJECTION, SOLUTION INTRAVENOUS; SUBCUTANEOUS at 13:59

## 2021-03-20 RX ADMIN — SERTRALINE HYDROCHLORIDE 100 MG: 100 TABLET ORAL at 08:32

## 2021-03-20 RX ADMIN — ACETAMINOPHEN 975 MG: 325 TABLET, FILM COATED ORAL at 22:44

## 2021-03-20 RX ADMIN — HEPARIN SODIUM 5000 UNITS: 5000 INJECTION, SOLUTION INTRAVENOUS; SUBCUTANEOUS at 05:09

## 2021-03-20 RX ADMIN — POTASSIUM CHLORIDE 20 MEQ: 750 TABLET, EXTENDED RELEASE ORAL at 08:33

## 2021-03-20 RX ADMIN — BISACODYL 10 MG: 10 SUPPOSITORY RECTAL at 17:43

## 2021-03-20 RX ADMIN — ACETAMINOPHEN 975 MG: 325 TABLET, FILM COATED ORAL at 05:08

## 2021-03-20 RX ADMIN — HEPARIN SODIUM 5000 UNITS: 5000 INJECTION, SOLUTION INTRAVENOUS; SUBCUTANEOUS at 19:46

## 2021-03-20 RX ADMIN — HYDROXYCHLOROQUINE SULFATE 200 MG: 200 TABLET, FILM COATED ORAL at 08:32

## 2021-03-20 RX ADMIN — POTASSIUM CHLORIDE 20 MEQ: 750 TABLET, EXTENDED RELEASE ORAL at 08:32

## 2021-03-20 RX ADMIN — PANTOPRAZOLE SODIUM 40 MG: 40 TABLET, DELAYED RELEASE ORAL at 08:32

## 2021-03-20 RX ADMIN — SENNOSIDES AND DOCUSATE SODIUM 1 TABLET: 8.6; 5 TABLET ORAL at 19:46

## 2021-03-20 RX ADMIN — OXYCODONE HYDROCHLORIDE 5 MG: 5 TABLET ORAL at 22:44

## 2021-03-20 ASSESSMENT — MIFFLIN-ST. JEOR: SCORE: 1309.6

## 2021-03-20 ASSESSMENT — ACTIVITIES OF DAILY LIVING (ADL)
ADLS_ACUITY_SCORE: 16
ADLS_ACUITY_SCORE: 16

## 2021-03-20 NOTE — PROGRESS NOTES
Pt transferred to  from 6D s/p pericardiectomy on 3/15.  Upon arrival, pt in NAD; A/Ox4, vss on RA.   Pt reports no BM since last Sunday, so new order obtained for suppository.   Plan to increase diuretics to IV lasix tid, and await TVR surgery Monday or Tuesday.   Pt's father present at bedside.  2 RN skin check completed by MM and SO.    Pt belongings upon transfer:  Cell phone &   Clothing and shoes  Laptop  Personal toiletries  Heart pillow  Personal bathrobe and pillow

## 2021-03-20 NOTE — PLAN OF CARE
Major Shift Events:  Pt ambulated the halls, endorses sharp pain when laying flat and taking a deep breath - friction rub noted.  Chest CT for PE completed, eating well, IV lasix given w/good output.    Plan: Continue diuresing - ECHO planned for Sunday/Monday w/Tricuspid Valve repair possible pending results  For vital signs and complete assessments, please see documentation flowsheets.

## 2021-03-20 NOTE — PROGRESS NOTES
Major Shift Events:  Pt denies pain/sob, sternal incision site WNL, old CT sites slight drainage.  Still having constipation  Plan: Bowel regimen, diuresing, ECHO on Sunday, TV replacement Monday/Tuesday  For vital signs and complete assessments, please see documentation flowsheets.

## 2021-03-20 NOTE — PROGRESS NOTES
Cardiovascular Surgery Progress Note  03/20/2021  Surgeon: Dr Jang           Assessment and Plan:     Sissy Donaldson is a 35 year old female with a PMH of SLE with recurrent pericardial effusions and pericarditis and constrictive pericarditis now s/p complete percardiectomy with Dr. Jang on 3/15/21.     Cardiovascular:    s/p: Complete Pericardectomy 3/15/2, Off pump  Severe TR post-pericardectomy   - Echo:preop 55-60%  - Intraop MIKE: severe TR  - Follow up TTE 3/18- shows severe TR with dilated IVC. Reviewed with Dr. Jang.   - started IV lasix 40 mg BID 3/19, 24 hour I/O net -1.2L. Not sure I/O is accurate, measuring intake inconsistently per patient. Weight done later in the day and is down ~6 kg from yesterday (? Different scale)   - Increase Lasix to TID today and reevaluate in am. If good diuresis, will get a limited echo to look at TR, if still severe she may need TV repair.   - Chest tubes: med x 2 removed 3/17, Pleural tubes x 2 removed 3/18    Pulmonary:  Postop mechanical ventilation, resolved  - Extubated in the OR; Saturating well on RA 95%.   - Supplemental O2 PRN to keep sats > 92%. Wean off as tolerated.  - Pulm toilet, IS, activity and deep breathing     Neurology / MSK:  Postop pain  - Controlled with tylenol, IV Dilaudid, Robaxin, and oxycodone prn.  - Anxiety- ativan and atarax PRN  - Erector spinae catheter bilat-  removed 3/18     / Renal:  - No Hx of renal disease. Most recent creatinine 0.5 adequate UOP. WT stable  - Diuresis: PTA torsemide 20 mg daily, Aldactone 12.5 mg daily.   - plan to diurese over next couple days with net goal -1L/day, as above     GI / FEN:   Nutrition  - Regular diet, continue bowel regimen  - Suppository today       Infectious Disease:  Stress induced leukocytosis  - WBC 6.0, remains afebrile, no signs or symptoms of infection  - Completed perioperative antibiotics    Hematology:   Acute blood loss anemia and thrombocytopenia  - Hgb 11.8; Plt 207, no signs or  "symptoms of active bleeding     Prophylaxis:   - Stress ulcer prophylaxis: Pantoprazole 40 mg daily for 30 days  - DVT prophylaxis: Subcutaneous heparin, SCD    Disposition:   - Transferred to  on POD 1  - Discharge to home pending diuresis and repeat echo, possibly Monday      Discussed with CVTS Fellow as needed.  Staff surgeons have been informed of changes through both verbal and written communication.      Zahra Segura, LINO, CNP  [facility] Cardiovascular Thoracic Surgery   O: [phone]  Pgr [phone]            Subjective/Interval History:     No overnight events.States pain is well managed on current regimen. Slept well overnight.  Tolerating diet, is passing flatus, - BM. No nausea or vomiting.  Breathing well without complaints. Was having some pain with deep breathing when lying down yesterday, this is resolved today.   Working with therapies.   Denies chest pain, palpitations, dizziness, syncopal symptoms, fevers, chills, myalgias, or sternal popping/clicking.         Physical Exam:   Blood pressure 106/70, pulse 95, temperature 97.5  F (36.4  C), temperature source Oral, resp. rate 20, height 1.651 m (5' 5\"), weight 61.4 kg (135 lb 4.8 oz), last menstrual period 03/01/2021, SpO2 98 %, not currently breastfeeding.  Vitals:    03/18/21 0515 03/19/21 0500 03/20/21 1300   Weight: 64.6 kg (142 lb 6.7 oz) 67.1 kg (148 lb) 61.4 kg (135 lb 4.8 oz)      Weight; stable.   24 hr Fluid status; net loss 1L.   MAPs: 73-81    Gen: NAD, resting in bed. Dad is at the bedside.   CV: RRR, S1S2 normal, +rub at the apex. JVP not elevated  Pulm: clear to auscultation bilaterally, no rhonchi or wheezes  Abd: soft, non-tender, no guarding, feels bloated, no BM for a few days.   Ext: No lower extremity edema  Incision: clean, dry, intact, no erythema  Chest Tube sites: dressings clean and dry  Neuro: grossly normal  Psych: calm, cooperative            Data:    Imaging:  reviewed recent imaging  No results found for this or any " previous visit (from the past 24 hour(s)).    Chest CT wo contrast 3/19/2021: IMPRESSION:   1. Exam is negative for pulmonary embolism.   2. Small bilateral pleural effusions with associated atelectasis.       Labs:  GLUCOSE:   Recent Labs   Lab 03/20/21  0636 03/19/21  1742 03/19/21  1153 03/19/21  0916 03/19/21  0716 03/18/21  0553 03/17/21  2340 03/17/21  1645 03/17/21  0626 03/16/21  2237 03/16/21  0341 03/16/21  0341 03/15/21  1334 03/15/21  1334   *  --   --   --  94 96  --   --  101*  --   --  117*  --  124*   BGM  --  88 132* 94  --   --  87 78  --  98   < > 125*   < >  --     < > = values in this interval not displayed.

## 2021-03-20 NOTE — PROGRESS NOTES
Transfer Report    Transferred from: 6D to 6C  Via: Walking w/staff  Reason for transfer: No longer needing higher level of care  Family: Pt notified them  Belongings: With pt  Chart: Sent w/pt  Medications: None in the bin  Report called to NESS Garcia

## 2021-03-20 NOTE — PLAN OF CARE
Pt has had uneventful night. Pt denies discomfort. Expresses anxiety over current medical situation but is coping. VSS. Emotional support provided. Bed in low position with wheels locked. Call bell within reach. Will continue to monitor.

## 2021-03-21 ENCOUNTER — APPOINTMENT (OUTPATIENT)
Dept: CARDIOLOGY | Facility: CLINIC | Age: 36
DRG: 271 | End: 2021-03-21
Attending: NURSE PRACTITIONER
Payer: COMMERCIAL

## 2021-03-21 LAB
ANION GAP SERPL CALCULATED.3IONS-SCNC: 3 MMOL/L (ref 3–14)
BUN SERPL-MCNC: 9 MG/DL (ref 7–30)
CALCIUM SERPL-MCNC: 9 MG/DL (ref 8.5–10.1)
CHLORIDE SERPL-SCNC: 100 MMOL/L (ref 94–109)
CO2 SERPL-SCNC: 32 MMOL/L (ref 20–32)
CREAT SERPL-MCNC: 0.62 MG/DL (ref 0.52–1.04)
ERYTHROCYTE [DISTWIDTH] IN BLOOD BY AUTOMATED COUNT: 12.8 % (ref 10–15)
GFR SERPL CREATININE-BSD FRML MDRD: >90 ML/MIN/{1.73_M2}
GLUCOSE SERPL-MCNC: 105 MG/DL (ref 70–99)
HCT VFR BLD AUTO: 39.8 % (ref 35–47)
HGB BLD-MCNC: 12.9 G/DL (ref 11.7–15.7)
MAGNESIUM SERPL-MCNC: 1.8 MG/DL (ref 1.6–2.3)
MCH RBC QN AUTO: 29.3 PG (ref 26.5–33)
MCHC RBC AUTO-ENTMCNC: 32.4 G/DL (ref 31.5–36.5)
MCV RBC AUTO: 90 FL (ref 78–100)
PLATELET # BLD AUTO: 235 10E9/L (ref 150–450)
POTASSIUM SERPL-SCNC: 4.4 MMOL/L (ref 3.4–5.3)
RBC # BLD AUTO: 4.41 10E12/L (ref 3.8–5.2)
SODIUM SERPL-SCNC: 134 MMOL/L (ref 133–144)
WBC # BLD AUTO: 6.7 10E9/L (ref 4–11)

## 2021-03-21 PROCEDURE — 36415 COLL VENOUS BLD VENIPUNCTURE: CPT | Performed by: SURGERY

## 2021-03-21 PROCEDURE — 93325 DOPPLER ECHO COLOR FLOW MAPG: CPT | Mod: 26 | Performed by: STUDENT IN AN ORGANIZED HEALTH CARE EDUCATION/TRAINING PROGRAM

## 2021-03-21 PROCEDURE — 93321 DOPPLER ECHO F-UP/LMTD STD: CPT | Mod: 26 | Performed by: STUDENT IN AN ORGANIZED HEALTH CARE EDUCATION/TRAINING PROGRAM

## 2021-03-21 PROCEDURE — 85027 COMPLETE CBC AUTOMATED: CPT | Performed by: SURGERY

## 2021-03-21 PROCEDURE — 250N000013 HC RX MED GY IP 250 OP 250 PS 637: Performed by: PHYSICIAN ASSISTANT

## 2021-03-21 PROCEDURE — 250N000013 HC RX MED GY IP 250 OP 250 PS 637: Performed by: NURSE PRACTITIONER

## 2021-03-21 PROCEDURE — 80048 BASIC METABOLIC PNL TOTAL CA: CPT | Performed by: SURGERY

## 2021-03-21 PROCEDURE — 250N000013 HC RX MED GY IP 250 OP 250 PS 637: Performed by: SURGERY

## 2021-03-21 PROCEDURE — 93308 TTE F-UP OR LMTD: CPT | Mod: 26 | Performed by: STUDENT IN AN ORGANIZED HEALTH CARE EDUCATION/TRAINING PROGRAM

## 2021-03-21 PROCEDURE — 214N000001 HC R&B CCU UMMC

## 2021-03-21 PROCEDURE — 93325 DOPPLER ECHO COLOR FLOW MAPG: CPT

## 2021-03-21 PROCEDURE — 83735 ASSAY OF MAGNESIUM: CPT | Performed by: SURGERY

## 2021-03-21 PROCEDURE — 250N000011 HC RX IP 250 OP 636: Performed by: SURGERY

## 2021-03-21 RX ORDER — POTASSIUM CHLORIDE 750 MG/1
20 TABLET, EXTENDED RELEASE ORAL 2 TIMES DAILY
Status: DISCONTINUED | OUTPATIENT
Start: 2021-03-21 | End: 2021-03-23 | Stop reason: HOSPADM

## 2021-03-21 RX ORDER — TORSEMIDE 20 MG/1
40 TABLET ORAL
Status: DISCONTINUED | OUTPATIENT
Start: 2021-03-21 | End: 2021-03-23 | Stop reason: HOSPADM

## 2021-03-21 RX ORDER — MAGNESIUM OXIDE 400 MG/1
400 TABLET ORAL 2 TIMES DAILY
Status: COMPLETED | OUTPATIENT
Start: 2021-03-21 | End: 2021-03-22

## 2021-03-21 RX ADMIN — ACETAMINOPHEN 975 MG: 325 TABLET, FILM COATED ORAL at 15:33

## 2021-03-21 RX ADMIN — SENNOSIDES AND DOCUSATE SODIUM 1 TABLET: 8.6; 5 TABLET ORAL at 20:10

## 2021-03-21 RX ADMIN — MAGNESIUM OXIDE 400 MG: 400 TABLET ORAL at 20:10

## 2021-03-21 RX ADMIN — METHOCARBAMOL 750 MG: 750 TABLET, FILM COATED ORAL at 22:43

## 2021-03-21 RX ADMIN — PANTOPRAZOLE SODIUM 40 MG: 40 TABLET, DELAYED RELEASE ORAL at 08:32

## 2021-03-21 RX ADMIN — SENNOSIDES AND DOCUSATE SODIUM 1 TABLET: 8.6; 5 TABLET ORAL at 08:31

## 2021-03-21 RX ADMIN — SPIRONOLACTONE 25 MG: 25 TABLET ORAL at 08:32

## 2021-03-21 RX ADMIN — HEPARIN SODIUM 5000 UNITS: 5000 INJECTION, SOLUTION INTRAVENOUS; SUBCUTANEOUS at 20:11

## 2021-03-21 RX ADMIN — TORSEMIDE 40 MG: 20 TABLET ORAL at 15:33

## 2021-03-21 RX ADMIN — HYDROXYCHLOROQUINE SULFATE 200 MG: 200 TABLET, FILM COATED ORAL at 08:31

## 2021-03-21 RX ADMIN — OXYCODONE HYDROCHLORIDE 5 MG: 5 TABLET ORAL at 09:29

## 2021-03-21 RX ADMIN — ONDANSETRON 4 MG: 4 TABLET, ORALLY DISINTEGRATING ORAL at 12:34

## 2021-03-21 RX ADMIN — METHOCARBAMOL 750 MG: 750 TABLET, FILM COATED ORAL at 09:26

## 2021-03-21 RX ADMIN — HEPARIN SODIUM 5000 UNITS: 5000 INJECTION, SOLUTION INTRAVENOUS; SUBCUTANEOUS at 05:50

## 2021-03-21 RX ADMIN — TORSEMIDE 40 MG: 20 TABLET ORAL at 08:31

## 2021-03-21 RX ADMIN — MAGNESIUM OXIDE 400 MG: 400 TABLET ORAL at 08:32

## 2021-03-21 RX ADMIN — POTASSIUM CHLORIDE 20 MEQ: 750 TABLET, EXTENDED RELEASE ORAL at 08:32

## 2021-03-21 RX ADMIN — POTASSIUM CHLORIDE 20 MEQ: 750 TABLET, EXTENDED RELEASE ORAL at 20:11

## 2021-03-21 RX ADMIN — ACETAMINOPHEN 975 MG: 325 TABLET, FILM COATED ORAL at 05:50

## 2021-03-21 RX ADMIN — ACETAMINOPHEN 975 MG: 325 TABLET, FILM COATED ORAL at 22:42

## 2021-03-21 RX ADMIN — HEPARIN SODIUM 5000 UNITS: 5000 INJECTION, SOLUTION INTRAVENOUS; SUBCUTANEOUS at 13:32

## 2021-03-21 RX ADMIN — SERTRALINE HYDROCHLORIDE 100 MG: 100 TABLET ORAL at 08:32

## 2021-03-21 RX ADMIN — OXYCODONE HYDROCHLORIDE 5 MG: 5 TABLET ORAL at 22:43

## 2021-03-21 ASSESSMENT — ACTIVITIES OF DAILY LIVING (ADL)
ADLS_ACUITY_SCORE: 16

## 2021-03-21 ASSESSMENT — MIFFLIN-ST. JEOR: SCORE: 1295.54

## 2021-03-21 NOTE — PLAN OF CARE
Pt showed no significant changes today.  Echo today showed improvement in her TR. The plan will be to continue on oral diuretics and recheck echo on Tuesday, 3/23, per Dr. Jang. If echo remains improves, pt will be allowed to discharge home and be followed closely in outpatient.   Pt should be encouraged to walk more.   Pt states her pain is best managed with oxy and robaxin.

## 2021-03-21 NOTE — PLAN OF CARE
Temp: 98.7  F (37.1  C) Temp src: Oral BP: 99/55 Pulse: 103   Resp: 18 SpO2: 100 % O2 Device: None (Room air)      PRN: Oxy x1    A:   Neuro: A/Ox4. Calls appropriately   Cardiac/Tele:  VVS. SR. Afebrile. Denies chest pain   Respiratory: Room air. Tolerating well  GI/: Continent. Urinating adequately. No BM this shift. Scheduled senna given.  Diet/Appetite: 2400 Na+ diet  Skin: No new deficits noted  LDAs: R PIV  Activity: Independent   Pain: Reported incisional and back pain. PRN given and effective.    P: TVR repair on Monday or Tuesday. Continue to monitor and notify team with changes.

## 2021-03-21 NOTE — PROGRESS NOTES
Cardiovascular Surgery Progress Note  03/21/2021  Surgeon: Dr Jang           Assessment and Plan:     Sissy Donaldson is a 35 year old female with a PMH of SLE with recurrent pericardial effusions and pericarditis and constrictive pericarditis now s/p complete percardiectomy with Dr. Jang on 3/15/21.     Cardiovascular:    s/p: Complete Pericardectomy 3/15/2, Off pump  Severe TR post-pericardectomy   - Echo:preop 55-60%  - Intraop MIKE: severe TR  - Follow up TTE 3/18- shows severe TR with dilated IVC. Reviewed with Dr. Jang.   - started IV lasix 40 mg BID 3/19  - Increased IV Lasix to TID 3/21, I/O inaccurate, but weight is down another 1.4 kg today. Creat stable.   - change IV Lasix to Torsemide 40 mg BID today and get a limited echo to assess TR. Pending echo results, may need TV repair.   - Limited echo today - TR is now mild with mild RV dilation.   - discussed with Dr. Jang, will watch for two days on oral diuretics and recheck limited Echo on Tuesday, if still mild TR will discharge with close cardiology follow up.   - Chest tubes: med x 2 removed 3/17, Pleural tubes x 2 removed 3/18    Pulmonary:  Postop mechanical ventilation, resolved  - Extubated in the OR; Saturating well on RA 95%.   - Supplemental O2 PRN to keep sats > 92%. Wean off as tolerated.  - Pulm toilet, IS, activity and deep breathing     Neurology / MSK:  Postop pain  - Controlled with tylenol, IV Dilaudid, Robaxin, and oxycodone prn.  - Anxiety- ativan and atarax PRN  - Erector spinae catheter bilat-  removed 3/18     / Renal:  - No Hx of renal disease. Most recent creatinine 0.5 adequate UOP. WT stable  - Diuresis: PTA torsemide 20 mg daily, Aldactone increased to 25 mg daily 3/20 with scheduled potassium due to low K+, which is now 4.4.  - Decrease potassium replacement to 20 mEq to Bid from TID   - change IV Lasix to po Torsemide as above.      GI / FEN:   Nutrition  - Regular diet, continue bowel regimen  - Given a suppository  "3/20, + BM       Infectious Disease:  Stress induced leukocytosis  - WBC 6.7, remains afebrile, no signs or symptoms of infection  - Completed perioperative antibiotics    Hematology:   Acute blood loss anemia and thrombocytopenia  - Hgb 12.9; Plt 235, no signs or symptoms of active bleeding     Prophylaxis:   - Stress ulcer prophylaxis: Pantoprazole 40 mg daily for 30 days  - DVT prophylaxis: Subcutaneous heparin, SCD    Disposition:   - Transferred to  on POD 1  - Discharge to home pending diuresis and repeat echo, possibly Monday      Discussed with CVTS Fellow as needed.  Staff surgeons have been informed of changes through both verbal and written communication.      Zahra Segura, LINO, CNP  [facility] Cardiovascular Thoracic Surgery   O: [phone]  Pgr [phone]            Subjective/Interval History:     No overnight events.States pain is well managed on current regimen. Slept well overnight.  Tolerating diet, is passing flatus, +BM. No nausea or vomiting.  Breathing well without complaints. Was having some pain with deep breathing when lying down yesterday, this is resolved today.   Getting around independently   Denies chest pain, palpitations, dizziness, syncopal symptoms, fevers, chills, myalgias, or sternal popping/clicking.         Physical Exam:   Blood pressure 99/55, pulse 103, temperature 98.7  F (37.1  C), temperature source Oral, resp. rate 18, height 1.651 m (5' 5\"), weight 60 kg (132 lb 3.2 oz), last menstrual period 03/01/2021, SpO2 100 %, not currently breastfeeding.  Vitals:    03/19/21 0500 03/20/21 1300 03/21/21 0500   Weight: 67.1 kg (148 lb) 61.4 kg (135 lb 4.8 oz) 60 kg (132 lb 3.2 oz)      Weight; down another 1.4 kg today, down 3.5 kg from admission.   24 hr Fluid status; inaccurate  MAPs: 73-81    Gen: NAD, just got out of the shower.    CV: RRR,  JVP not elevated  Pulm: resp non-labored at rest and activity. Does report feeling a little SOB after completing the shower.   Abd: soft, " non-tender, no guarding, less bloated today, + BM with suppository yesterday.    Ext: Trace lower extremity edema  Incision: clean, dry, intact, no erythema  Chest Tube sites: dressings clean and dry  Neuro: grossly normal  Psych: calm, cooperative            Data:    Imaging:  reviewed recent imaging  Recent Results (from the past 24 hour(s))   Echocardiogram Limited    Narrative    [id number]  [id number]  [id number]  [id number]^DALTON^FUNMILAYO     [facility],[place]  Echocardiography Laboratory  [address] [place] [id number]     Name: JOVAN GARNETT  MRN: [id number]  : 1985  Study Date: 2021 08:34 AM  Age: 35 yrs  Gender: Female  Patient Location: [facility] [place]  Reason For Study: Tricuspid Regurgitation  Ordering Physician: FUNMILAYO HOFFMAN  Performed By: Crissy Graham RDCS     BSA: 1.7 m2  Height: 65 in  Weight: 132 lb  HR: 92  BP: 99/55 mmHg  ______________________________________________________________________________  Procedure  Limited Portable Echo Adult.  ______________________________________________________________________________  Interpretation Summary  Limited TTE to reassess RV function and tricuspid regurgitation.  There is mild tricuspid regurgitation. It is due to a secondary etiology  (annular dilation due to RA enlargement).  Mild right ventricular dilation is present. The global RV function is low-  normal.  The RA pressure is >15 mmHg and the estimated PA systolic pressure is at least  33 mmHg.  This study was compared with the study from 2021. The severity of the TR  has declined. The RV function is similar or mildly improved and the RV size is  slightly smaller (particularly the RV basal diameter) on direct visual  comparison with the prior study. The PA pressure is similar.  ______________________________________________________________________________  Left Ventricle  Global and regional left ventricular function is normal with an EF of 55-60%.  A  mild septal bounce is still visualized.     Right Ventricle  Mild right ventricular dilation is present. The global RV function is low-  normal.     Atria  Moderate left atrial enlargement is present. Severe right atrial enlargement  is present.     Tricuspid Valve  Mild tricuspid insufficiency is present. The etiology of the tricuspid  regurgitation is secondary (annular dilation due to RA dilation). There is no  hepatic vein flow reversal.     Compared to Previous Study  This study was compared with the study from 2021 . The severity of the  TR has declined. The RV function is similar or mildly improved and the RV size  is slightly smaller (particularly the RV basal diameter) on direct visual  comparison with the prior study. The PA pressure is similar.  ______________________________________________________________________________  Doppler Measurements & Calculations  TR max tracey: 202.1 cm/sec  TR max P.4 mmHg     ______________________________________________________________________________  Report approved by: Shelly Forde 2021 10:34 AM             Limited echo 3/21/2021: Interpretation Summary  Limited TTE to reassess RV function and tricuspid regurgitation.  There is mild tricuspid regurgitation. It is due to a secondary etiology  (annular dilation due to RA enlargement).  Mild right ventricular dilation is present. The global RV function is low-  normal.  The RA pressure is >15 mmHg and the estimated PA systolic pressure is at least  33 mmHg.  This study was compared with the study from 2021. The severity of the TR  has declined. The RV function is similar or mildly improved and the RV size is  slightly smaller (particularly the RV basal diameter) on direct visual  comparison with the prior study. The PA pressure is similar.    Chest CT wo contrast 3/19/2021: IMPRESSION:   1. Exam is negative for pulmonary embolism.   2. Small bilateral pleural effusions with associated  atelectasis.       Labs:  GLUCOSE:   Recent Labs   Lab 03/21/21  0701 03/20/21  0636 03/19/21  1742 03/19/21  1153 03/19/21  0916 03/19/21  0716 03/18/21  0553 03/17/21  2340 03/17/21  1645 03/17/21  0626 03/16/21  2237 03/16/21  0341 03/16/21  0341   * 101*  --   --   --  94 96  --   --  101*  --   --  117*   BGM  --   --  88 132* 94  --   --  87 78  --  98   < > 125*    < > = values in this interval not displayed.

## 2021-03-22 LAB
ANION GAP SERPL CALCULATED.3IONS-SCNC: 4 MMOL/L (ref 3–14)
BACTERIA SPEC CULT: NORMAL
BUN SERPL-MCNC: 10 MG/DL (ref 7–30)
CALCIUM SERPL-MCNC: 9.3 MG/DL (ref 8.5–10.1)
CHLORIDE SERPL-SCNC: 97 MMOL/L (ref 94–109)
CO2 SERPL-SCNC: 34 MMOL/L (ref 20–32)
CREAT SERPL-MCNC: 0.71 MG/DL (ref 0.52–1.04)
ERYTHROCYTE [DISTWIDTH] IN BLOOD BY AUTOMATED COUNT: 12.9 % (ref 10–15)
GFR SERPL CREATININE-BSD FRML MDRD: >90 ML/MIN/{1.73_M2}
GLUCOSE SERPL-MCNC: 99 MG/DL (ref 70–99)
HCT VFR BLD AUTO: 43.3 % (ref 35–47)
HGB BLD-MCNC: 14 G/DL (ref 11.7–15.7)
Lab: NORMAL
MAGNESIUM SERPL-MCNC: 2 MG/DL (ref 1.6–2.3)
MCH RBC QN AUTO: 29 PG (ref 26.5–33)
MCHC RBC AUTO-ENTMCNC: 32.3 G/DL (ref 31.5–36.5)
MCV RBC AUTO: 90 FL (ref 78–100)
PLATELET # BLD AUTO: 301 10E9/L (ref 150–450)
POTASSIUM SERPL-SCNC: 4.2 MMOL/L (ref 3.4–5.3)
RBC # BLD AUTO: 4.82 10E12/L (ref 3.8–5.2)
SODIUM SERPL-SCNC: 135 MMOL/L (ref 133–144)
SPECIMEN SOURCE: NORMAL
WBC # BLD AUTO: 7.8 10E9/L (ref 4–11)

## 2021-03-22 PROCEDURE — 250N000013 HC RX MED GY IP 250 OP 250 PS 637: Performed by: SURGERY

## 2021-03-22 PROCEDURE — 250N000011 HC RX IP 250 OP 636: Performed by: SURGERY

## 2021-03-22 PROCEDURE — 250N000013 HC RX MED GY IP 250 OP 250 PS 637: Performed by: NURSE PRACTITIONER

## 2021-03-22 PROCEDURE — 36415 COLL VENOUS BLD VENIPUNCTURE: CPT | Performed by: SURGERY

## 2021-03-22 PROCEDURE — 85027 COMPLETE CBC AUTOMATED: CPT | Performed by: SURGERY

## 2021-03-22 PROCEDURE — 85049 AUTOMATED PLATELET COUNT: CPT | Performed by: SURGERY

## 2021-03-22 PROCEDURE — 250N000013 HC RX MED GY IP 250 OP 250 PS 637: Performed by: PHYSICIAN ASSISTANT

## 2021-03-22 PROCEDURE — 80048 BASIC METABOLIC PNL TOTAL CA: CPT | Performed by: SURGERY

## 2021-03-22 PROCEDURE — 214N000001 HC R&B CCU UMMC

## 2021-03-22 PROCEDURE — 83735 ASSAY OF MAGNESIUM: CPT | Performed by: SURGERY

## 2021-03-22 RX ORDER — FLUMAZENIL 0.1 MG/ML
0.2 INJECTION, SOLUTION INTRAVENOUS
Status: CANCELLED | OUTPATIENT
Start: 2021-03-22

## 2021-03-22 RX ORDER — POLYETHYLENE GLYCOL 3350 17 G/17G
17 POWDER, FOR SOLUTION ORAL DAILY PRN
Status: DISCONTINUED | OUTPATIENT
Start: 2021-03-22 | End: 2021-03-23 | Stop reason: HOSPADM

## 2021-03-22 RX ORDER — NALOXONE HYDROCHLORIDE 0.4 MG/ML
0.2 INJECTION, SOLUTION INTRAMUSCULAR; INTRAVENOUS; SUBCUTANEOUS
Status: CANCELLED | OUTPATIENT
Start: 2021-03-22

## 2021-03-22 RX ORDER — FENTANYL CITRATE 50 UG/ML
25-50 INJECTION, SOLUTION INTRAMUSCULAR; INTRAVENOUS
Status: CANCELLED | OUTPATIENT
Start: 2021-03-22

## 2021-03-22 RX ORDER — NALOXONE HYDROCHLORIDE 0.4 MG/ML
0.4 INJECTION, SOLUTION INTRAMUSCULAR; INTRAVENOUS; SUBCUTANEOUS
Status: CANCELLED | OUTPATIENT
Start: 2021-03-22

## 2021-03-22 RX ADMIN — POTASSIUM CHLORIDE 20 MEQ: 750 TABLET, EXTENDED RELEASE ORAL at 19:44

## 2021-03-22 RX ADMIN — HEPARIN SODIUM 5000 UNITS: 5000 INJECTION, SOLUTION INTRAVENOUS; SUBCUTANEOUS at 04:58

## 2021-03-22 RX ADMIN — OXYCODONE HYDROCHLORIDE 5 MG: 5 TABLET ORAL at 22:21

## 2021-03-22 RX ADMIN — ACETAMINOPHEN 975 MG: 325 TABLET, FILM COATED ORAL at 06:22

## 2021-03-22 RX ADMIN — HEPARIN SODIUM 5000 UNITS: 5000 INJECTION, SOLUTION INTRAVENOUS; SUBCUTANEOUS at 11:42

## 2021-03-22 RX ADMIN — ACETAMINOPHEN 975 MG: 325 TABLET, FILM COATED ORAL at 22:21

## 2021-03-22 RX ADMIN — TORSEMIDE 40 MG: 20 TABLET ORAL at 16:12

## 2021-03-22 RX ADMIN — SENNOSIDES AND DOCUSATE SODIUM 1 TABLET: 8.6; 5 TABLET ORAL at 08:29

## 2021-03-22 RX ADMIN — SENNOSIDES AND DOCUSATE SODIUM 1 TABLET: 8.6; 5 TABLET ORAL at 19:44

## 2021-03-22 RX ADMIN — HYDROXYCHLOROQUINE SULFATE 200 MG: 200 TABLET, FILM COATED ORAL at 08:29

## 2021-03-22 RX ADMIN — SPIRONOLACTONE 25 MG: 25 TABLET ORAL at 08:29

## 2021-03-22 RX ADMIN — TORSEMIDE 40 MG: 20 TABLET ORAL at 08:29

## 2021-03-22 RX ADMIN — PANTOPRAZOLE SODIUM 40 MG: 40 TABLET, DELAYED RELEASE ORAL at 08:29

## 2021-03-22 RX ADMIN — SERTRALINE HYDROCHLORIDE 100 MG: 100 TABLET ORAL at 08:29

## 2021-03-22 RX ADMIN — POTASSIUM CHLORIDE 20 MEQ: 750 TABLET, EXTENDED RELEASE ORAL at 08:29

## 2021-03-22 RX ADMIN — MAGNESIUM OXIDE 400 MG: 400 TABLET ORAL at 19:44

## 2021-03-22 RX ADMIN — MAGNESIUM OXIDE 400 MG: 400 TABLET ORAL at 08:29

## 2021-03-22 RX ADMIN — ACETAMINOPHEN 975 MG: 325 TABLET, FILM COATED ORAL at 13:33

## 2021-03-22 ASSESSMENT — ACTIVITIES OF DAILY LIVING (ADL)
ADLS_ACUITY_SCORE: 16
ADLS_ACUITY_SCORE: 16
ADLS_ACUITY_SCORE: 14
ADLS_ACUITY_SCORE: 16

## 2021-03-22 ASSESSMENT — MIFFLIN-ST. JEOR: SCORE: 1285.56

## 2021-03-22 NOTE — PLAN OF CARE
"/65 (BP Location: Right arm)   Pulse 100   Temp 98.7  F (37.1  C) (Oral)   Resp 16   Ht 1.651 m (5' 5\")   Wt 59 kg (130 lb)   LMP 03/01/2021   SpO2 98%   BMI 21.63 kg/m      Time: 2841-1005  R. 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 Severe TR post pericardiectomy. ECHO tomorrow.   Neuro:  A&Ox4  Activity: up independent.   Pain: tolerable pain level to sternal incision sites and upper back. Scheduled Tylenol and Oxycodone 5 mg at bedtime.   Cardiac: SR/ST, heart murmer heard. Denied chest pain or palpitation. BP stable and within parameter. Torsemide 40 mg po at 1600.  HOUSER getting better per pt report.   Respiratory: RA, sating > 95%, denied SOB or coughing. LS clear all lobes. Deep breathing and coughing encouraged.   GI/: last bm today. Active bowel sound. Torsemide 40 mg po this afternoon.   Voiding spontaneously with large urine output.   Diet: low sat fat Na+ < 2.4 g. Good appetite. Denied N/V.   Skin: mediastinal incision open to air, healing well without s/s of infection.    LDAs: PIV saline locked.   Labs/Imaging: WNL,   New change this shift: none   Plan: ECHO tomorrow and possible discharge after procedure.     "

## 2021-03-22 NOTE — PLAN OF CARE
Temp: 97.8  F (36.6  C) Temp src: Oral BP: 108/83 Pulse: 99   Resp: 18 SpO2: 99 % O2 Device: None (Room air)      PRN: Oxy, Robaxin x1     A:   Neuro: A/Ox4. Calls appropriately. Pleasant and cooperative    Cardiac/Tele:  VVS. SR. Afebrile. Denies chest pain   Respiratory: Room air. Tolerating well  GI/: Continent. Urinating adequately. No BM this shift. Scheduled senna given.  Diet/Appetite: 2400 Na+ diet  Skin: No new deficits noted  LDAs: R PIV  Activity: Independent   Pain: Reported incisional and back pain. PRN given and effective.     P: Repeat echo on Tuesday. Continue to monitor and notify team with changes.

## 2021-03-22 NOTE — PROGRESS NOTES
Cardiovascular Surgery Progress Note  03/22/2021         Assessment and Plan:     Sissy Donaldson is a 35 year old female with a PMH of SLE with recurrent pericardial effusions and pericarditis and constrictive pericarditis now s/p complete percardiectomy with Dr. Jang on 3/15/21.      Cardiovascular:    s/p: Complete Pericardectomy 3/15/2, Off pump  Severe TR post-pericardectomy   - Echo:preop 55-60%  - Intraop MIKE: severe TR  - Follow up TTE 3/18- shows severe TR with dilated IVC. Reviewed with Dr. Jang.   - Torsemide 40 mg BID   - Limited echo 3/21; TR is now mild with mild RV dilation. Repeat echo 3/23.    - Discussed with Dr. Jang, will watch for two days on oral diuretics and recheck limited Echo on Tuesday, if still mild TR will discharge with close cardiology follow up.   - Chest tubes: removed.     Pulmonary:  Postop mechanical ventilation, resolved  - Extubated in the OR; Saturating well on RA 95%.   - Supplemental O2 PRN to keep sats > 92%. Wean off as tolerated.  - Pulm toilet, IS, activity and deep breathing      Neurology / MSK:  Postop pain  Hx Panic disorder   - Controlled with tylenol, IV Dilaudid, Robaxin, and oxycodone prn.  - Anxiety: ativan and atarax PRN  - Erector spinae catheters removed 3/18      / Renal:  - No Hx of renal disease. Most recent creatinine 0.5 adequate UOP. WT stable  - Diuresis: Torsemide 40 mg daily, Aldactone increased to 25 mg daily 3/20 with scheduled potassium due to low K+, which is now 4.2.   - Potassium 20 mEq PO BID     GI / FEN:   Nutrition  - Regular diet, continue bowel regimen. Having regular BMs.       Infectious Disease:  Stress induced leukocytosis  - WBC WNL, remains afebrile, no signs or symptoms of infection  - Completed perioperative antibiotics     Hematology:   Acute blood loss anemia and thrombocytopenia  - Hgb stable; Plt WNL, no signs or symptoms of active bleeding     Prophylaxis:   - Stress ulcer prophylaxis: Pantoprazole 40 mg daily for 30  "days  - DVT prophylaxis: Subcutaneous heparin off 3/22, SCD while in bed     Disposition:   - Transferred to  on POD 1  - Discharge to home pending diuresis and repeat echo on Tues 2/23. OK to discharge per Dr Jang if only mild TR.       Discussed with CVTS Fellow as needed.  Staff surgeons have been informed of changes through both verbal and written communication.      Denis Loo PA-C  Cardiothoracic Surgery  Pager [phone]    1:49 PM   March 22, 2021        Interval History:     No overnight events.  States pain is well managed on current regimen. Slept well overnight.  Tolerating diet, is passing flatus, + BM. No nausea or vomiting.  Breathing well without complaints.   Working with therapies and ambulating in halls without assistance.   Denies chest pain, palpitations, dizziness, syncopal symptoms, fevers, chills, myalgias, or sternal popping/clicking.         Physical Exam:   Blood pressure 116/65, pulse 95, temperature 98  F (36.7  C), temperature source Oral, resp. rate 16, height 1.651 m (5' 5\"), weight 59 kg (130 lb), last menstrual period 03/01/2021, SpO2 100 %, not currently breastfeeding.  Vitals:    03/20/21 1300 03/21/21 0500 03/22/21 0455   Weight: 61.4 kg (135 lb 4.8 oz) 60 kg (132 lb 3.2 oz) 59 kg (130 lb)      Weight; -4.5 kg since admit and trending down.   24 hr Fluid status; net loss 700 mL.   MAPs: 60 - 92     Gen: A&Ox4, NAD  Neuro: no focal deficits   CV: RRR, normal S1 S2, no murmurs, rubs or gallops.   Pulm: CTA, no wheezing or rhonchi, normal breathing on RA  Abd: nondistended, normal BS, soft, nontender  Ext: no peripheral edema  Incision: clean, dry, intact, no erythema, sternum stable  Tubes/drain sites: scabs clean and dry         Data:    Imaging:  reviewed recent imaging, no acute concerns.     Labs:  BMP  Recent Labs   Lab 03/22/21  0550 03/21/21  0701 03/20/21  0636 03/19/21  1837 03/19/21  0716    134 136  --  138   POTASSIUM 4.2 4.4 3.7 3.8 2.9*   CHLORIDE 97 100 " 102  --  101   BARTOLO 9.3 9.0 8.8  --  8.3*   CO2 34* 32 29  --  30   BUN 10 9 7  --  6*   CR 0.71 0.62 0.56  --  0.50*   GLC 99 105* 101*  --  94     CBC  Recent Labs   Lab 03/22/21  0550 03/21/21  0701 03/20/21  0636 03/19/21  0716   WBC 7.8 6.7 6.0 5.3   RBC 4.82 4.41 3.91 3.80   HGB 14.0 12.9 11.8 11.1*   HCT 43.3 39.8 36.2 34.5*   MCV 90 90 93 91   MCH 29.0 29.3 30.2 29.2   MCHC 32.3 32.4 32.6 32.2   RDW 12.9 12.8 12.8 12.7    235 207 173     INR  No lab results found in last 7 days.   Liver Function Studies -   Recent Labs   Lab Test 03/16/21  0341   PROTTOTAL 6.0*   ALBUMIN 3.4   BILITOTAL 0.9   ALKPHOS 59   AST 26   ALT 16     GLUCOSE:   Recent Labs   Lab 03/22/21  0550 03/21/21  0701 03/20/21  0636 03/19/21  1742 03/19/21  1153 03/19/21  0916 03/19/21  0716 03/18/21  0553 03/17/21  2340 03/17/21  1645 03/17/21  0626 03/16/21  2237   GLC 99 105* 101*  --   --   --  94 96  --   --  101*  --    BGM  --   --   --  88 132* 94  --   --  87 78  --  98

## 2021-03-22 NOTE — OP NOTE
CO-SURGEON NOTE    Procedure Date: 03/15/2021      PREOPERATIVE DIAGNOSES:  Constrictive pericarditis.      POSTOPERATIVE DIAGNOSIS:  Constrictive pericarditis with moderate tricuspid regurgitation.      OPERATIVE PROCEDURES:  Complete pericardectomy with cardiopulmonary bypass standby.      SURGEON:  Kirit Jang MD      CO-SURGEON:  Ac aJmes MD. The role of a co-surgeon was necessary for the operation because of complexity of the case as well as the absence of any qualified resident or fellow.      ASSISTANT SURGEON:  Kayode Avila MD      OPERATIVE INDICATIONS:  The patient is a 35-year-old female who was diagnosed to have severe constrictive pericarditis with significant symptoms of fluid overload and increasing diuretic requirement.  A decision was made to proceed with pericardiectomy.  I discussed benefits of the surgery with the patient including risk of death, stroke, bleeding, infection, renal failure, arrhythmias.  She understands and wanted to proceed with the above.      OPERATIVE FINDINGS:  The patient's sternum was of adequate quality.  The pericardium was extremely thickened and encasing the entire heart.      DESCRIPTION OF PROCEDURE:  Please see the separate operative report by Dr. Jang for details of the operation. I specifically performed a significant portion of the pericardiectomy over the right atrium and inferior vena cava with the Metzenbaum scissors, as well as assisting with the remainder of the pericardiectomy.    Ac James MD

## 2021-03-22 NOTE — PLAN OF CARE
Sissy Donaldson is a 35 year old female with a PMHx of SLE with recurrent pericardial effusions and constrictive pericarditis s/p complete pericardiectomy with Dr. Jang on 3/15/21. Post-operative TTE 3/18/2021 revealed severe TR w/ moderate RV dilation and mild RVSD. Cardiology consulted on 3/19/2021 for further recommendations regarding new onset TR.     The patient was IV diuresed for 2 days (40 mg IV lasix BID) following which a limited TTE was performed on 3/21/2021. Repeat TTE 2/21/2021 showed significant improvement in TR from severe to mild range, with improvement in mild RV dysfunction.    Physiology of transient post-op TR likely due to altered ventricular compliance s/p pericardiectomy.     Recommendations:   - Resume home Torsemide 20 mg daily.   - Outpatient follow up with Dr Parikh in 2-3 weeks time.  - Repeat TTE coming AM to reassess TR and follow up TTE in 3 months time.  - Outpatient follow up with CT surgery as planned.      Cardiology will sign off.   Plan of care discussed with primary team.    Mariaa Almanza MD,   Cardiovascular Disease Fellow  Pager [phone]

## 2021-03-22 NOTE — PLAN OF CARE
Pt admitted 3/15 with pericardial effusion. Medical history includes SLE with recurrent pericardial effusions and pericarditis and constrictive pericarditis now s/p complete pericardiectomy with Dr. Jang on 3/15/21. Post op severe TR, 3/15, which has improved since pericardectomy. Echo to be repeated to monitor tricuspid function. Possible discharge later in the week with no invasive intervention. VSS, ambulating, voiding, eating well. Pain under control with scheduled Tylenol. Continuing to monitor.

## 2021-03-23 ENCOUNTER — PATIENT OUTREACH (OUTPATIENT)
Dept: CARE COORDINATION | Facility: CLINIC | Age: 36
End: 2021-03-23

## 2021-03-23 ENCOUNTER — APPOINTMENT (OUTPATIENT)
Dept: CARDIOLOGY | Facility: CLINIC | Age: 36
DRG: 271 | End: 2021-03-23
Attending: PHYSICIAN ASSISTANT
Payer: COMMERCIAL

## 2021-03-23 ENCOUNTER — PATIENT OUTREACH (OUTPATIENT)
Dept: CARDIOLOGY | Facility: CLINIC | Age: 36
End: 2021-03-23

## 2021-03-23 VITALS
WEIGHT: 129 LBS | BODY MASS INDEX: 21.49 KG/M2 | HEIGHT: 65 IN | OXYGEN SATURATION: 97 % | TEMPERATURE: 97.8 F | HEART RATE: 92 BPM | RESPIRATION RATE: 16 BRPM | DIASTOLIC BLOOD PRESSURE: 61 MMHG | SYSTOLIC BLOOD PRESSURE: 98 MMHG

## 2021-03-23 LAB
ANION GAP SERPL CALCULATED.3IONS-SCNC: 6 MMOL/L (ref 3–14)
BUN SERPL-MCNC: 16 MG/DL (ref 7–30)
CALCIUM SERPL-MCNC: 9.4 MG/DL (ref 8.5–10.1)
CHLORIDE SERPL-SCNC: 97 MMOL/L (ref 94–109)
CO2 SERPL-SCNC: 31 MMOL/L (ref 20–32)
CREAT SERPL-MCNC: 0.79 MG/DL (ref 0.52–1.04)
ERYTHROCYTE [DISTWIDTH] IN BLOOD BY AUTOMATED COUNT: 12.8 % (ref 10–15)
GFR SERPL CREATININE-BSD FRML MDRD: >90 ML/MIN/{1.73_M2}
GLUCOSE SERPL-MCNC: 106 MG/DL (ref 70–99)
HCT VFR BLD AUTO: 42.2 % (ref 35–47)
HGB BLD-MCNC: 13.7 G/DL (ref 11.7–15.7)
MAGNESIUM SERPL-MCNC: 2.2 MG/DL (ref 1.6–2.3)
MCH RBC QN AUTO: 29.2 PG (ref 26.5–33)
MCHC RBC AUTO-ENTMCNC: 32.5 G/DL (ref 31.5–36.5)
MCV RBC AUTO: 90 FL (ref 78–100)
PLATELET # BLD AUTO: 323 10E9/L (ref 150–450)
POTASSIUM SERPL-SCNC: 3.6 MMOL/L (ref 3.4–5.3)
RBC # BLD AUTO: 4.69 10E12/L (ref 3.8–5.2)
SODIUM SERPL-SCNC: 135 MMOL/L (ref 133–144)
WBC # BLD AUTO: 7.6 10E9/L (ref 4–11)

## 2021-03-23 PROCEDURE — 93321 DOPPLER ECHO F-UP/LMTD STD: CPT | Mod: 26 | Performed by: INTERNAL MEDICINE

## 2021-03-23 PROCEDURE — 93308 TTE F-UP OR LMTD: CPT | Mod: 26 | Performed by: INTERNAL MEDICINE

## 2021-03-23 PROCEDURE — 250N000013 HC RX MED GY IP 250 OP 250 PS 637: Performed by: PHYSICIAN ASSISTANT

## 2021-03-23 PROCEDURE — 250N000013 HC RX MED GY IP 250 OP 250 PS 637: Performed by: NURSE PRACTITIONER

## 2021-03-23 PROCEDURE — 93325 DOPPLER ECHO COLOR FLOW MAPG: CPT

## 2021-03-23 PROCEDURE — 83735 ASSAY OF MAGNESIUM: CPT | Performed by: SURGERY

## 2021-03-23 PROCEDURE — 36415 COLL VENOUS BLD VENIPUNCTURE: CPT | Performed by: SURGERY

## 2021-03-23 PROCEDURE — 80048 BASIC METABOLIC PNL TOTAL CA: CPT | Performed by: SURGERY

## 2021-03-23 PROCEDURE — 93325 DOPPLER ECHO COLOR FLOW MAPG: CPT | Mod: 26 | Performed by: INTERNAL MEDICINE

## 2021-03-23 PROCEDURE — 85027 COMPLETE CBC AUTOMATED: CPT | Performed by: SURGERY

## 2021-03-23 PROCEDURE — 250N000013 HC RX MED GY IP 250 OP 250 PS 637: Performed by: SURGERY

## 2021-03-23 RX ORDER — POTASSIUM CHLORIDE 750 MG/1
10 TABLET, EXTENDED RELEASE ORAL ONCE
Status: COMPLETED | OUTPATIENT
Start: 2021-03-23 | End: 2021-03-23

## 2021-03-23 RX ORDER — AMOXICILLIN 250 MG
1 CAPSULE ORAL 2 TIMES DAILY PRN
Qty: 30 TABLET | Refills: 0 | Status: SHIPPED | OUTPATIENT
Start: 2021-03-23

## 2021-03-23 RX ORDER — ACETAMINOPHEN 325 MG/1
325-650 TABLET ORAL EVERY 6 HOURS PRN
Qty: 50 TABLET | Refills: 0
Start: 2021-03-23

## 2021-03-23 RX ORDER — TORSEMIDE 20 MG/1
40 TABLET ORAL
Qty: 100 TABLET | Refills: 0 | Status: SHIPPED | OUTPATIENT
Start: 2021-03-23 | End: 2021-04-15

## 2021-03-23 RX ORDER — SPIRONOLACTONE 25 MG/1
25 TABLET ORAL DAILY
Qty: 30 TABLET | Refills: 0 | Status: SHIPPED | OUTPATIENT
Start: 2021-03-24 | End: 2021-04-15

## 2021-03-23 RX ORDER — POTASSIUM CHLORIDE 1500 MG/1
20 TABLET, EXTENDED RELEASE ORAL 2 TIMES DAILY
Qty: 60 TABLET | Refills: 0 | Status: SHIPPED | OUTPATIENT
Start: 2021-03-23 | End: 2021-04-15

## 2021-03-23 RX ADMIN — SPIRONOLACTONE 25 MG: 25 TABLET ORAL at 08:16

## 2021-03-23 RX ADMIN — ACETAMINOPHEN 650 MG: 325 TABLET, FILM COATED ORAL at 08:15

## 2021-03-23 RX ADMIN — TORSEMIDE 40 MG: 20 TABLET ORAL at 08:12

## 2021-03-23 RX ADMIN — HYDROXYCHLOROQUINE SULFATE 200 MG: 200 TABLET, FILM COATED ORAL at 08:11

## 2021-03-23 RX ADMIN — SENNOSIDES AND DOCUSATE SODIUM 1 TABLET: 8.6; 5 TABLET ORAL at 08:12

## 2021-03-23 RX ADMIN — PANTOPRAZOLE SODIUM 40 MG: 40 TABLET, DELAYED RELEASE ORAL at 08:12

## 2021-03-23 RX ADMIN — POTASSIUM CHLORIDE 10 MEQ: 750 TABLET, EXTENDED RELEASE ORAL at 11:11

## 2021-03-23 RX ADMIN — SERTRALINE HYDROCHLORIDE 100 MG: 100 TABLET ORAL at 08:14

## 2021-03-23 RX ADMIN — POTASSIUM CHLORIDE 20 MEQ: 750 TABLET, EXTENDED RELEASE ORAL at 08:14

## 2021-03-23 ASSESSMENT — ACTIVITIES OF DAILY LIVING (ADL)
ADLS_ACUITY_SCORE: 14

## 2021-03-23 ASSESSMENT — MIFFLIN-ST. JEOR: SCORE: 1281.02

## 2021-03-23 NOTE — TELEPHONE ENCOUNTER
LVM for Ashli after receiving message that she would like to follow up with Dr Parikh in 2-3 weeks, will put on 4/15 at 11 am with labs at 1030, but requested that Ashli call back and confirm.

## 2021-03-23 NOTE — DISCHARGE SUMMARY
[facility], [place]   Cardiothoracic Surgery Hospital Discharge Summary     Sissy Donaldson MRN# [id number]   Age: 35 year old YOB: 1985     Admitting Physician:  Kirit Jang MD  Discharge Physician:  HENRY aDvison  Primary Care Physician:        Sydni Bazzi     DATE OF ADMISSION: 3/15/2021      DATE OF DISCHARGE: March 23, 2021     Admit Wt: 63.5 kg   Discharge Wt: 58.5 kg          Primary Diagnoses:   1. Constrictive pericarditis  2. S/P Complete pericardectomy   3. Moderate tricuspid regurgitation improved to mild TR  4. Hypervolemia   5. Moderate RV dilation, improved          Secondary Diagnoses:   1. Systemic Lupus Erythematosus   2. Anxiety     PROCEDURES PERFORMED:   Date: 3/18/21.  Surgeon: Dr. Kirit Jang   Complete pericardectomy with cardiopulmonary bypass standby.     OPERATIVE FINDINGS:    The patient's sternum was of adequate quality.  The pericardium was extremely thickened and encasing the entire heart.   Intra-op decision (with Cardiology) was to treat TR with diuresis for a few days and return to fix TR if necessary.     INTRAOPERATIVE COMPLICATIONS:  none    PATHOLOGY RESULTS:    Surgical Pathology 3/15/21-   A. Pericardium: Pericardial hyalinized fibrosis   B. Pericardium: Pericardial hyalinized fibrosis     CULTURE RESULTS:  No positive cultures     CONSULTS:    1. PT/OT  2. Cardiology     BRIEF HISTORY OF ILLNESS:  Sissy is a very pleasant, 35-year-old woman with a long-standing history of lupus who has had recurrent pericardial effusions and pericarditis. She had been followed by Cardiology and was referred to Cardiac surgery for a second opinion about whether or not she may have constrictive pericarditis in Jan, 2021.  She later started to develop heart failure symptoms concerning for constrictive pericarditis.  Based on all her investigations and her clinical presentation, she was deemed to have constrictive pericarditis and  "was scheduled for elective pericardectomy.     HOSPITAL COURSE: Sissy Donaldson is a 35 year old female who on 3/15/2021 underwent the above-named procedures.  She tolerated the operation well and postoperatively was admitted to the CVICU already extubated with neuro status intact.  Her ICU stay was uncomplicated and treatment included aggressive diuresis with Cardiology Team following.  She was transferred to the post-surgical telemetry unit on POD # 1.      She did very well overall during her stay. She progressed well with diuresis and her moderate TR reduced to mild TR per serial echocardiograms. Her chest tubes were removed without complication. She was able to walk around the unit without help and had very little pain. She had no complaints on day of discharge.     Prior to discharge, her pain was controlled well, she was able to perform most ADLs and ambulate without difficulty, and had full return of bowel and bladder function.  On March 23, 2021, she was discharged to home in stable condition.    Patient discharged on aspirin:  Not indicated   Patient discharged on beta blocker: not needed   Patient discharged on ACE Inhibitor/ARB: not needed            Discharge Disposition:     Discharged to home            Condition on Discharge:     Discharge condition: Stable   Discharge vitals: Blood pressure 98/61, pulse 92, temperature 97.7  F (36.5  C), temperature source Oral, resp. rate 16, height 1.651 m (5' 5\"), weight 58.5 kg (129 lb), last menstrual period 03/01/2021, SpO2 97 %, not currently breastfeeding.     Code status on discharge: Full Code     Vitals:    03/21/21 0500 03/22/21 0455 03/23/21 0504   Weight: 60 kg (132 lb 3.2 oz) 59 kg (130 lb) 58.5 kg (129 lb)       DAY OF DISCHARGE PHYSICAL EXAM:    Blood pressure 98/61, pulse 92, temperature 97.7  F (36.5  C), temperature source Oral, resp. rate 16, height 1.651 m (5' 5\"), weight 58.5 kg (129 lb), last menstrual period 03/01/2021, SpO2 97 %, not " currently breastfeeding.  Vitals:    03/21/21 0500 03/22/21 0455 03/23/21 0504   Weight: 60 kg (132 lb 3.2 oz) 59 kg (130 lb) 58.5 kg (129 lb)      Weight; - 5.5 kg since admit and trending down.   24 hr Fluid status; net loss 1.8 L.   MAPs: 75 - 81     Gen: A&Ox4, NAD  Neuro: no focal deficits   CV: RRR, normal S1 S2, no murmurs, rubs or gallops.   Pulm: CTA, no wheezing or rhonchi, normal breathing on RA  Abd: nondistended, normal BS, soft, nontender  Ext: no peripheral edema  Incision: clean, dry, intact, no erythema, sternum stable  Tubes/drain sites: scabs clean and dry    BMP  Recent Labs   Lab 03/23/21  0549 03/22/21  0550 03/21/21  0701 03/20/21  0636    135 134 136   POTASSIUM 3.6 4.2 4.4 3.7   CHLORIDE 97 97 100 102   BARTOLO 9.4 9.3 9.0 8.8   CO2 31 34* 32 29   BUN 16 10 9 7   CR 0.79 0.71 0.62 0.56   * 99 105* 101*     CBC  Recent Labs   Lab 03/23/21  0549 03/22/21  0550 03/21/21  0701 03/20/21  0636   WBC 7.6 7.8 6.7 6.0   RBC 4.69 4.82 4.41 3.91   HGB 13.7 14.0 12.9 11.8   HCT 42.2 43.3 39.8 36.2   MCV 90 90 90 93   MCH 29.2 29.0 29.3 30.2   MCHC 32.5 32.3 32.4 32.6   RDW 12.8 12.9 12.8 12.8    301 235 207     Liver Function Studies -   Recent Labs   Lab Test 03/16/21  0341   PROTTOTAL 6.0*   ALBUMIN 3.4   BILITOTAL 0.9   ALKPHOS 59   AST 26   ALT 16     Recent Labs   Lab 03/23/21  0549 03/22/21  0550 03/21/21  0701 03/20/21  0636 03/19/21  1742 03/19/21  1153 03/19/21  0916 03/19/21  0716 03/18/21  0553 03/17/21  2340 03/17/21  1645 03/16/21 2237 03/16/21 2237   * 99 105* 101*  --   --   --  94 96  --   --    < >  --    BGM  --   --   --   --  88 132* 94  --   --  87 78  --  98    < > = values in this interval not displayed.     ECHOCARDIOGRAM, 3/21/2021-   Limited TTE to reassess RV function and tricuspid regurgitation. There is mild tricuspid  regurgitation. It is due to a secondary etiology (annular dilation due to RA enlargement).  Mild right ventricular dilation is  present. The global RV function is low-normal. The RA pressure  is >15 mmHg and the estimated PA systolic pressure is at least 33 mmHg.  This study was compared with the study from 03/18/2021. The severity of the TR has declined. The  RV function is similar or mildly improved and the RV size is slightly smaller (particularly the RV  basal diameter) on direct visual comparison with the prior study. The PA pressure is similar.    ECHOCARDIOGRAM, 3/23/2021-   Global and regional left ventricular function is normal with an EF of 55-60%.  Global right ventricular function is mildly reduced.  Mild tricuspid insufficiency is present.  Dilation of the inferior vena cava is present with abnormal respiratory variation in diameter.  Post pericardiectomy. No pericardial effusion is present.    CXR 3/19/2021-   Postsurgical changes median sternotomy wires intact. 2 views of the chest are obtained  demonstrating unremarkable soft tissues and no acute bony abnormalities. Cardiomediastinal  borders are clear. No enlargement of the cardiac silhouette. Small left and trace right pleural  effusions and associated basilar opacities. No appreciable pneumothorax. No focal airspace opacities.  Impression:   New small left and trace right pleural effusions with associated basilar atelectasis. Otherwise no  acute pulmonary pathology.    DISCHARGE INSTRUCTIONS:  You had a sternotomy, avoid lifting anything greater than ten pounds for 6 weeks after surgery and then less than 20 pounds for an additional 6 weeks. Do not reach backwards or use arms to push out of chair. Do not let people pull on your arms to assist with standing. Avoid twisting or reaching too far across your body.  Avoid strenuous activities such as bowling, vacuuming, raking, shoveling, golf or tennis for 12 weeks after your surgery. It is okay to resume sex if you feel comfortable in doing so. You may have to try different positions with your partner.  Splint your chest  incision by hugging a pillow or bringing your arms across your chest when coughing or sneezing.     No driving for 4 weeks after surgery or while on pain medication.     Shower or wash your incisions daily with soap and water (or as instructed), pat dry. Keep wound clean and dry, showers are okay after discharge, but don't let spray hit directly on incision. No baths or swimming for 1 month. Cover chest tube sites with gauze until they stop draining, then leave open to air. It is not abnormal for chest tube sites to drain yellowish/clear fluid for up to 2-3 weeks after surgery.   Watch for signs of infection: increased redness, tenderness, warmth or any drainage that appears infected (pus like) or is persistent.  Also a temperature > 100.5 F or chills. Call your surgeon or primary care provider's office immediately. Remove any skin glue left on incisions after 10-14 days. This will not affect your incision and can speed up healing.    Exercise is very important in your recovery. Please follow the guidelines set up for you in your cardiac rehab classes at the hospital. If outpatient cardiac rehab was ordered for you, we highly recommend you participate. If you have problems arranging your cardiac rehab, please call [phone] for all locations, with the exception of [place], please call [phone] and [place], please call [phone].    Avoid sitting for prolonged periods of time, try to walk every hour during the day. If you have a leg incision, elevate your leg often when you are not walking.    Check your weight when you get home from the hospital and continue to check it daily through your recovery for at least a month. If you notice a weight gain of 2-3 pounds in a week, notify your primary care physician, cardiologist or surgeon.    Bowel activity may be slow after surgery. If necessary, you may take an over the counter laxative such as Milk of Magnesia or Miralax. You may have stool softeners  prescribed (docusate sodium, Senokot). We recommend using stool softeners while using narcotics for pain (oxycodone/percocet, hydrocodone/vicodin, hydromorphone/dilaudid).    Wean OFF of narcotics (oxycodone, dilaudid, hydrocodone) as soon as possible. You should continue taking acetaminophen as long as you have any surgical pain as the first choice for pain control and add narcotics as necessary for pain to be tolerable.      DO NOT SMOKE.  IF YOU NEED HELP QUITTING, PLEASE TALK WITH YOUR CARDIOLOGIST OR PRIMARY DOCTOR.    REGARDING PRESCRIPTION REFILLS.  If you need a refill on your pain medication contact us to discuss your pain and a possible one time refill.   All other medications will be adjusted, discontinued and re-filled by your primary care physician and/or your cardiologist as they were prior to your surgery. We have given you enough for one to three month with possibly one refill.    POST-OPERATIVE CLINIC VISITS  You have a follow up visit on 3/30/21: you have an echocardiogram at 10:30 am at the [facility], then follow up with CVTS Surgery Advance Care Practitioners 1 pm at the [facility].  You will then return to the care of your primary provider and your cardiologist. Future medication refills should come from your PCP or Cardiologist.   You should see your primary care provider in 2-4 weeks after discharge.   It is important to see your cardiologist (Dr Parikh) about 4-6 weeks after discharge.     PRE-ADMISSION MEDICATIONS:  No current facility-administered medications on file prior to encounter.   acetaminophen (TYLENOL) 325 MG tablet, Take 325 mg by mouth every 6 hours as needed  cholecalciferol 50 MCG (2000 UT) tablet, Take 2,000 Units by mouth every morning   hydroxychloroquine (PLAQUENIL) 200 MG tablet, Take 200 mg by mouth every morning   metroNIDAZOLE (METROCREAM) 0.75 % external cream, as needed   sertraline (ZOLOFT) 100 MG tablet, Take 100 mg by mouth every  morning   spironolactone (ALDACTONE) 25 MG tablet, Take 12.5 mg by mouth every morning   sulfacetamide sodium-sulfur 10-5 % LOTN, as needed   torsemide (DEMADEX) 10 MG tablet, Take 20 mg by mouth every morning        DISCHARGE MEDICATIONS:    Sissy Donaldson   Home Medication Instructions ADIS:[id number]    Printed on:03/23/21 1040   Medication Information                      acetaminophen (TYLENOL) 325 MG tablet  Take 1-2 tablets (325-650 mg) by mouth every 6 hours as needed for pain             cholecalciferol 50 MCG (2000 UT) tablet  Take 2,000 Units by mouth every morning              hydroxychloroquine (PLAQUENIL) 200 MG tablet  Take 200 mg by mouth every morning              metroNIDAZOLE (METROCREAM) 0.75 % external cream  as needed              potassium chloride ER (KLOR-CON M) 20 MEQ CR tablet  Take 1 tablet (20 mEq) by mouth 2 times daily             senna-docusate (SENOKOT-S/PERICOLACE) 8.6-50 MG tablet  Take 1 tablet by mouth 2 times daily as needed for constipation             sertraline (ZOLOFT) 100 MG tablet  Take 100 mg by mouth every morning              spironolactone (ALDACTONE) 25 MG tablet  Take 1 tablet (25 mg) by mouth daily             sulfacetamide sodium-sulfur 10-5 % LOTN  as needed              torsemide (DEMADEX) 20 MG tablet  Take 2 tablets (40 mg) by mouth 2 times daily               CC:Sydni Cabral Rebecca       [facility]   Cardiothoracic Surgery  Office phone: [phone]  Office fax: [phone]

## 2021-03-23 NOTE — DISCHARGE INSTRUCTIONS
AFTER YOU GO HOME FROM YOUR HEART SURGERY  (Complete pericardectomy 3/18/21 with Dr Kirit Jang)     You had a sternotomy, avoid lifting anything greater than ten pounds for 6 weeks after surgery and then less than 20 pounds for an additional 6 weeks. Do not reach backwards or use arms to push out of chair. Do not let people pull on your arms to assist with standing. Avoid twisting or reaching too far across your body.  Avoid strenuous activities such as bowling, vacuuming, raking, shoveling, golf or tennis for 12 weeks after your surgery. It is okay to resume sex if you feel comfortable in doing so. You may have to try different positions with your partner.  Splint your chest incision by hugging a pillow or bringing your arms across your chest when coughing or sneezing.     No driving for 4 weeks after surgery or while on pain medication.     Shower or wash your incisions daily with soap and water (or as instructed), pat dry. Keep wound clean and dry, showers are okay after discharge, but don't let spray hit directly on incision. No baths or swimming for 1 month. Cover chest tube sites with gauze until they stop draining, then leave open to air. It is not abnormal for chest tube sites to drain yellowish/clear fluid for up to 2-3 weeks after surgery.   Watch for signs of infection: increased redness, tenderness, warmth or any drainage that appears infected (pus like) or is persistent.  Also a temperature > 100.5 F or chills. Call your surgeon or primary care provider's office immediately. Remove any skin glue left on incisions after 10-14 days. This will not affect your incision and can speed up healing.    Exercise is very important in your recovery. Please follow the guidelines set up for you in your cardiac rehab classes at the hospital. If outpatient cardiac rehab was ordered for you, we highly recommend you participate. If you have problems arranging your cardiac rehab, please call [phone] for all  locations, with the exception of [place], please call [phone] and [facility], please call [phone].    Avoid sitting for prolonged periods of time, try to walk every hour during the day. If you have a leg incision, elevate your leg often when you are not walking.    Check your weight when you get home from the hospital and continue to check it daily through your recovery for at least a month. If you notice a weight gain of 2-3 pounds in a week, notify your primary care physician, cardiologist or surgeon.    Bowel activity may be slow after surgery. If necessary, you may take an over the counter laxative such as Milk of Magnesia or Miralax. You may have stool softeners prescribed (docusate sodium, Senokot). We recommend using stool softeners while using narcotics for pain (oxycodone/percocet, hydrocodone/vicodin, hydromorphone/dilaudid).    Wean OFF of narcotics (oxycodone, dilaudid, hydrocodone) as soon as possible. You should continue taking acetaminophen as long as you have any surgical pain as the first choice for pain control and add narcotics as necessary for pain to be tolerable.      DO NOT SMOKE.  IF YOU NEED HELP QUITTING, PLEASE TALK WITH YOUR CARDIOLOGIST OR PRIMARY DOCTOR.    REGARDING PRESCRIPTION REFILLS.  If you need a refill on your pain medication contact us to discuss your pain and a possible one time refill.   All other medications will be adjusted, discontinued and re-filled by your primary care physician and/or your cardiologist as they were prior to your surgery. We have given you enough for one to three month with possibly one refill.    POST-OPERATIVE CLINIC VISITS  You have a follow up visit on 3/30/21: you have an echocardiogram at 10:30 am at the [facility], then follow up with [facility] Surgery Advance Care Practitioners 1 pm at the [facility].  You will then return to the care of your primary provider and your cardiologist. Future medication refills should  come from your PCP or Cardiologist.   You should see your primary care provider in 2-4 weeks after discharge.   It is important to see your cardiologist (Dr Parikh) about 4-6 weeks after discharge.    If you do not hear from a  in 7 days, please call [phone] (choose option 1) and request to be seen with a general cardiologist or someone that you have seen in the past.   If there is a need to return to see CT Surgery please call our  at [phone].    SURGICAL QUESTIONS  Please call Aurora Davis or Ella Cooley with surgical recovery and medication questions, their phone numbers are listed below.  They will assist you with your needs and contact other surgery care team members as indicated.    On weekends or after hours, please call [phone] and ask the  to   page the Cardiothoracic Surgery fellow on call.      Thank you,    Your Cardiothoracic Surgery Team  Aurora Davis RN Care Coordinator-  [phone]   Ella Cooley RN Care Coordinator-  [phone]

## 2021-03-23 NOTE — PLAN OF CARE
"BP 98/61 (BP Location: Right arm)   Pulse 92   Temp 97.7  F (36.5  C) (Oral)   Resp 16   Ht 1.651 m (5' 5\")   Wt 58.5 kg (129 lb)   LMP 03/01/2021   SpO2 97%   BMI 21.47 kg/m      Time: 2909-2857  R. 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 Severe TR post pericardiectomy. ECHO today.   Neuro:  A&Ox4  Activity: up independent.   Pain: tolerable pain level to sternal incision sites and upper back. Scheduled Tylenol   Cardiac: SR/ST, heart murmer heard. Denied chest pain or palpitation. BP soft 98/61 and within parameter.  HOUSER getting better per pt report.   Respiratory: RA, sating > 95%, denied SOB or coughing. LS clear all lobes. Deep breathing and coughing encouraged.   GI/: last bm yesterday. Active bowel sound.  Voiding spontaneously without difficult.   Diet: low sat fat Na+ < 2.4 g. Good appetite. Denied N/V.   Skin: mediastinal incision open to air, healing well without s/s of infection.    LDAs: PIV saline locked.   Labs/Imaging: WNL,   New change this shift: none   Plan: ECHO today and possible discharge home after procedure  "

## 2021-03-23 NOTE — PLAN OF CARE
Pt and father were educated about incision care, activity restrictions, follow up appointments and medication changes. Pt verbalized understanding of information.  Pt and father went down to discharge pharmacy with belongings.

## 2021-03-23 NOTE — PLAN OF CARE
Occupational Therapy Discharge Summary    Reason for therapy discharge:    Discharged to home with assist and OP CR.    Progress towards therapy goal(s). See goals on Care Plan in [facility] electronic health record for goal details.  Goals partially met.  Barriers to achieving goals:   discharge from facility.    Therapy recommendation(s):    Continued therapy is recommended.  Rationale/Recommendations:  Pt would benefit from OP CR to progress functional endurance and maximize ADL/IADL I.

## 2021-03-24 ENCOUNTER — TELEPHONE (OUTPATIENT)
Dept: CARDIOLOGY | Facility: CLINIC | Age: 36
End: 2021-03-24

## 2021-03-24 NOTE — TELEPHONE ENCOUNTER
Appointments scheduled and called patient back to confirm. Detailed voicemail left. Silvia Ruiz RN

## 2021-03-24 NOTE — TELEPHONE ENCOUNTER
[facility] Call Center    Phone Message    May a detailed message be left on voicemail: yes     Reason for Call: Other: Pt calling in to confirm that she would like the 4/15 apt spot per the outreach encounter from Alex[facility] dated 3/23/21    Action Taken: Message routed to:  [facility]    Travel Screening: Not Applicable

## 2021-03-25 NOTE — PROGRESS NOTES
Attempted to contact the patient x3 for post-hospital call without answer. Will close encounter at this time.    Elo Givens CMA, [facility]  Post Hospital Discharge Team

## 2021-03-26 ENCOUNTER — CARE COORDINATION (OUTPATIENT)
Dept: CARDIOLOGY | Facility: CLINIC | Age: 36
End: 2021-03-26

## 2021-03-26 DIAGNOSIS — K21.9 ACID REFLUX: Primary | ICD-10-CM

## 2021-03-26 RX ORDER — PANTOPRAZOLE SODIUM 40 MG/1
40 TABLET, DELAYED RELEASE ORAL
Qty: 30 TABLET | Refills: 1 | Status: SHIPPED | OUTPATIENT
Start: 2021-03-26

## 2021-03-26 NOTE — PROGRESS NOTES
HOW ARE YOU DOING  Tell me how you have been doing since you were discharged from the hospital?  Has been doing very well and feels well.     ACTIVITY  How is your activity tolerance? Good    POST OP MONITORING  How is your pain on a 0-10 scale, how are you managing your pain? No pain only using occasional Tylenol    Are you still doing sternal precautions? Yes    Do you hear any clicking when you are moving or taking a deep breath?  No    Are you weighing yourself daily?  Yes weight unchanged from discharge, stable at 129 pounds, patient denies swelling or edema in feet, ankles or belly.     Are you using the inspirometer? Yes      SIGNS AND SYMPTOMS OF INFECTION  1. INCREASE IN PAIN no  2. FEVER no  3. DRAINAGE no    If Yes, color:                 5. REDNESS no    6. SWELLING no    All incisions are C/D/I    ASSISTANCE  Do you have someone at home to assist you with your daily activities?  Patient is staying with her parents.       MEDICATIONS  Is someone helping you to set up your medications?  Patient is independent.   Do you have any questions about your medications?  Yes, patient has severe reflux that wakes her at night and nothing relieves it.  Patient not started on pantoprazole as normally post op heart patient's are.  Called Denis Loo PA-C and he oked order.     Date: 3/26/2021    Time of Call:  1:30 PM     Diagnosis:  Acid relux     [ TORB ] Ordering provider: Denis Loo PA-C     Order: Pantoprazole 40 mg, one tab every am 30 min before breakfast.  30 tabs, one refill.      Order received by: Aurora Davis RN       Follow-up/additional notes: Script sent to patient's pharmacy and patient will .        Are you on a blood thinner?  No  Who is managing your INRs?       FOLLOW UP  Are you scheduled for cardiac rehab? Patient will call and schedule.       You are scheduled to see our surgery advanced practice provider for post operative follow up on 03/30 with echocardiogram prior.    You  are scheduled to see your cardiologist on 04/15  You are scheduled to see your primary care physician on, patient will call to schedule.        CONTACT INFORMATION  Please feel free to call us with any other questions or symptoms that are concerning for you at [phone], if it is after 4:30 in the afternoon, or a weekend please call [phone] and ask for the on call specialist.  We want to do everything we can to help prevent you needing to return to the ED, so please do not hesitate to call us.

## 2021-03-30 ENCOUNTER — OFFICE VISIT (OUTPATIENT)
Dept: CARDIOLOGY | Facility: CLINIC | Age: 36
End: 2021-03-30
Attending: SURGERY
Payer: COMMERCIAL

## 2021-03-30 ENCOUNTER — HOSPITAL ENCOUNTER (OUTPATIENT)
Dept: CARDIOLOGY | Facility: CLINIC | Age: 36
Discharge: HOME | End: 2021-03-30
Attending: PHYSICIAN ASSISTANT | Admitting: PHYSICIAN ASSISTANT
Payer: COMMERCIAL

## 2021-03-30 VITALS
BODY MASS INDEX: 21.33 KG/M2 | SYSTOLIC BLOOD PRESSURE: 104 MMHG | WEIGHT: 128 LBS | OXYGEN SATURATION: 100 % | HEIGHT: 65 IN | HEART RATE: 88 BPM | DIASTOLIC BLOOD PRESSURE: 70 MMHG

## 2021-03-30 DIAGNOSIS — Z98.890 HISTORY OF PERICARDIECTOMY: Primary | ICD-10-CM

## 2021-03-30 DIAGNOSIS — I50.32 CHRONIC DIASTOLIC CONGESTIVE HEART FAILURE (H): Primary | ICD-10-CM

## 2021-03-30 PROCEDURE — 93321 DOPPLER ECHO F-UP/LMTD STD: CPT | Mod: 26 | Performed by: INTERNAL MEDICINE

## 2021-03-30 PROCEDURE — 93308 TTE F-UP OR LMTD: CPT | Mod: 26 | Performed by: INTERNAL MEDICINE

## 2021-03-30 PROCEDURE — 99024 POSTOP FOLLOW-UP VISIT: CPT | Performed by: PHYSICIAN ASSISTANT

## 2021-03-30 PROCEDURE — 93325 DOPPLER ECHO COLOR FLOW MAPG: CPT

## 2021-03-30 PROCEDURE — G0463 HOSPITAL OUTPT CLINIC VISIT: HCPCS

## 2021-03-30 PROCEDURE — 93325 DOPPLER ECHO COLOR FLOW MAPG: CPT | Mod: 26 | Performed by: INTERNAL MEDICINE

## 2021-03-30 ASSESSMENT — MIFFLIN-ST. JEOR: SCORE: 1268.54

## 2021-03-30 ASSESSMENT — PAIN SCALES - GENERAL: PAINLEVEL: NO PAIN (0)

## 2021-03-30 NOTE — LETTER
3/30/2021      RE: Sissy Donaldson  [address]  [place] [address]       Dear Colleague,    Thank you for the opportunity to participate in the care of your patient, Sissy Donaldson, at the [facility] [place] [facility]. Please see a copy of my visit note below.    CARDIOTHORACIC SURGERY FOLLOW-UP VISIT     Sissy Donaldson   1985   [id number]      Reason for visit: Post-Op Complete Pericardectomy with Dr. Jang on 3/18/2021    HPI: Sissy Donaldson is a 35 year old year old female seen in clinic for a routine follow-up appointment after surgery. Patient has past medical history of constrictive pericarditis and moderate tricuspid valve regurgitation. Hospital course was unremarkable. She was aggressively diuresed during hospital stay and Echo on discharge showed improvement in tricuspid valve regurgitation to mild. Patient was discharged to home  3/23/21.    Patient has been doing well since discharge and now returns to clinic for postop visit.    Patient denies fever, chills, chest pain, palpitations, edema, SOB, lightheadedness, nausea and vomiting.   Weight is several kg's below admit weight. Weight 128 pounds today is same as her discharge weight.     Patient reports incision is healing well.        She will be attending cardiac rehabilitation and that is to start soon.      Patient is not on Coumadin.      PAST MEDICAL HISTORY:  Past Medical History:   Diagnosis Date     SLE (systemic lupus erythematosus related syndrome) (H)        PAST SURGICAL HISTORY:  Past Surgical History:   Procedure Laterality Date     CV CORONARY ANGIOGRAM N/A 1/12/2021    Procedure: CV CORONARY ANGIOGRAM;  Surgeon: Justin Ortega MD;  Location:  HEART CARDIAC CATH LAB     CV LEFT HEART CATH N/A 1/12/2021    Procedure: CV LEFT HEART CATH;  Surgeon: Justin Ortega MD;  Location:  HEART CARDIAC CATH LAB     CV RIGHT HEART  CATH MEASUREMENTS RECORDED N/A 1/12/2021    Procedure: CV RIGHT HEART CATH;  Surgeon: Justin Ortega MD;  Location:  HEART CARDIAC CATH LAB     PERICARDIOTOMY N/A 3/15/2021    Procedure: Median Sternotomy, Complete Pericardectomy, Cardiopulmonary Bypass Pump on Standby, Transesophageal Echocardiogram by Anesthesia.;  Surgeon: Kirit Jang MD;  Location:  OR     wisdom teeth         CURRENT MEDICATIONS:   Current Outpatient Medications   Medication     acetaminophen (TYLENOL) 325 MG tablet     cholecalciferol 50 MCG (2000 UT) tablet     hydroxychloroquine (PLAQUENIL) 200 MG tablet     metroNIDAZOLE (METROCREAM) 0.75 % external cream     pantoprazole (PROTONIX) 40 MG EC tablet     potassium chloride ER (KLOR-CON M) 20 MEQ CR tablet     senna-docusate (SENOKOT-S/PERICOLACE) 8.6-50 MG tablet     sertraline (ZOLOFT) 100 MG tablet     spironolactone (ALDACTONE) 25 MG tablet     sulfacetamide sodium-sulfur 10-5 % LOTN     torsemide (DEMADEX) 20 MG tablet     No current facility-administered medications for this visit.        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     LABS:  Last Basic Metabolic Panel:  Lab Results   Component Value Date     03/23/2021      Lab Results   Component Value Date    POTASSIUM 3.6 03/23/2021     Lab Results   Component Value Date    CHLORIDE 97 03/23/2021     Lab Results   Component Value Date    BARTOLO 9.4 03/23/2021     Lab Results   Component Value Date    CO2 31 03/23/2021     Lab Results   Component Value Date    BUN 16 03/23/2021     Lab Results   Component Value Date    CR 0.79 03/23/2021     Lab Results   Component Value Date     03/23/2021       Last CBC:   Lab Results   Component Value Date  "   WBC 7.6 03/23/2021     Lab Results   Component Value Date    RBC 4.69 03/23/2021     Lab Results   Component Value Date    HGB 13.7 03/23/2021     Lab Results   Component Value Date    HCT 42.2 03/23/2021     No components found for: MCT  Lab Results   Component Value Date    MCV 90 03/23/2021     Lab Results   Component Value Date    MCH 29.2 03/23/2021     Lab Results   Component Value Date    MCHC 32.5 03/23/2021     Lab Results   Component Value Date    RDW 12.8 03/23/2021     Lab Results   Component Value Date     03/23/2021       INR:  Lab Results   Component Value Date    INR 1.49 03/15/2021    INR 1.32 03/02/2021    INR 1.26 01/12/2021         IMAGING:      TTE 3/30  Interpretation Summary  Global and regional left ventricular function is normal with an EF of 60-65%.  Global right ventricular function is normal.  Moderate tricuspid insufficiency is present.  Pulmonary artery systolic pressure is normal.  The inferior vena cava was normal in size with preserved respiratory  variability.  No pericardial effusion is present.    PHYSICAL EXAM:   /70 (BP Location: Right arm, Patient Position: Chair, Cuff Size: Adult Regular)   Pulse 88   Ht 1.638 m (5' 4.5\")   Wt 58.1 kg (128 lb)   LMP 03/01/2021   SpO2 100%   BMI 21.63 kg/m    General: alert and oriented x 3, pleasant, no acute distress, normal mood and affect  Neuro: no focal deficits   CV: S1 S2, no murmurs, rubs or gallops, regular rate and rhythm, no peripheral edema  Pulm: bilateral breath sounds, clear to auscultation, easy work of breathing  Incision: incisions clean dry and intact without erythema, swelling or drainage    PROCEDURES: None       ASSESSMENT/PLAN:  Sissy Donaldson is a 35 year old year old female status post Pericardectomy who returns to clinic for postop visit.     1. Surgically doing well overall.  Incisions are healing well with no signs of infection. Increasing activity and strength overall.   2. Hemodynamics " are stable. No medication changes were needed today.  3. Follow up with your cardiologist, Dr Parikh, as scheduled on 4/15/21.  4. Continue Outpatient Cardiac Rehab until completed.   5. Continue sternal precautions for 12 weeks from surgery date.   6. No driving for 4 weeks from surgery date.  7. Patient continues to have some moderate TR per Echo performed today. Will send message to Dr. Jang to call patient to discuss early next week as he is out of town. Patient will also discuss with Cardiologist at next visit.        The total time spent with the patient was 25  minutes, > 50% of which was spent in counseling.    Laya Brown PA-C  Cardiothoracic Surgery  March 30, 2021    Please do not hesitate to contact me if you have any questions/concerns.     Sincerely,     Cardiovascular Thoracic Surgery       CC  Sydni Jang

## 2021-03-30 NOTE — PROGRESS NOTES
CARDIOTHORACIC SURGERY FOLLOW-UP VISIT     Sisys Donaldson   1985   [id number]      Reason for visit: Post-Op Complete Pericardectomy with Dr. Jang on 3/18/2021    HPI: Sissy Donaldson is a 35 year old year old female seen in clinic for a routine follow-up appointment after surgery. Patient has past medical history of constrictive pericarditis and moderate tricuspid valve regurgitation. Hospital course was unremarkable. She was aggressively diuresed during hospital stay and Echo on discharge showed improvement in tricuspid valve regurgitation to mild. Patient was discharged to home  3/23/21.    Patient has been doing well since discharge and now returns to clinic for postop visit.    Patient denies fever, chills, chest pain, palpitations, edema, SOB, lightheadedness, nausea and vomiting.   Weight is several kg's below admit weight. Weight 128 pounds today is same as her discharge weight.     Patient reports incision is healing well.        She will be attending cardiac rehabilitation and that is to start soon.      Patient is not on Coumadin.      PAST MEDICAL HISTORY:  Past Medical History:   Diagnosis Date     SLE (systemic lupus erythematosus related syndrome) (H)        PAST SURGICAL HISTORY:  Past Surgical History:   Procedure Laterality Date     CV CORONARY ANGIOGRAM N/A 1/12/2021    Procedure: CV CORONARY ANGIOGRAM;  Surgeon: Justin Ortega MD;  Location:  HEART CARDIAC CATH LAB     CV LEFT HEART CATH N/A 1/12/2021    Procedure: CV LEFT HEART CATH;  Surgeon: Justin Ortega MD;  [facility]     CV RIGHT HEART CATH MEASUREMENTS RECORDED N/A 1/12/2021    Procedure: CV RIGHT HEART CATH;  Surgeon: Justin Ortega MD;  Location:  HEART CARDIAC CATH LAB     PERICARDIOTOMY N/A 3/15/2021    Procedure: Median Sternotomy, Complete Pericardectomy, Cardiopulmonary Bypass Pump on Standby, Transesophageal Echocardiogram by Anesthesia.;  Surgeon: Kirit Jang,  MD;  Location: [facility] OR     wisdom teeth         CURRENT MEDICATIONS:   Current Outpatient Medications   Medication     acetaminophen (TYLENOL) 325 MG tablet     cholecalciferol 50 MCG (2000 UT) tablet     hydroxychloroquine (PLAQUENIL) 200 MG tablet     metroNIDAZOLE (METROCREAM) 0.75 % external cream     pantoprazole (PROTONIX) 40 MG EC tablet     potassium chloride ER (KLOR-CON M) 20 MEQ CR tablet     senna-docusate (SENOKOT-S/PERICOLACE) 8.6-50 MG tablet     sertraline (ZOLOFT) 100 MG tablet     spironolactone (ALDACTONE) 25 MG tablet     sulfacetamide sodium-sulfur 10-5 % LOTN     torsemide (DEMADEX) 20 MG tablet     No current facility-administered medications for this visit.        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     LABS:  Last Basic Metabolic Panel:  Lab Results   Component Value Date     03/23/2021      Lab Results   Component Value Date    POTASSIUM 3.6 03/23/2021     Lab Results   Component Value Date    CHLORIDE 97 03/23/2021     Lab Results   Component Value Date    BARTOLO 9.4 03/23/2021     Lab Results   Component Value Date    CO2 31 03/23/2021     Lab Results   Component Value Date    BUN 16 03/23/2021     Lab Results   Component Value Date    CR 0.79 03/23/2021     Lab Results   Component Value Date     03/23/2021       Last CBC:   Lab Results   Component Value Date    WBC 7.6 03/23/2021     Lab Results   Component Value Date    RBC 4.69 03/23/2021     Lab Results   Component Value Date    HGB 13.7 03/23/2021     Lab Results   Component Value Date    HCT 42.2 03/23/2021     No components found for: MCT  Lab Results   Component Value Date    MCV 90 03/23/2021     Lab Results   Component Value Date    MCH 29.2  "03/23/2021     Lab Results   Component Value Date    MCHC 32.5 03/23/2021     Lab Results   Component Value Date    RDW 12.8 03/23/2021     Lab Results   Component Value Date     03/23/2021       INR:  Lab Results   Component Value Date    INR 1.49 03/15/2021    INR 1.32 03/02/2021    INR 1.26 01/12/2021         IMAGING:      TTE 3/30  Interpretation Summary  Global and regional left ventricular function is normal with an EF of 60-65%.  Global right ventricular function is normal.  Moderate tricuspid insufficiency is present.  Pulmonary artery systolic pressure is normal.  The inferior vena cava was normal in size with preserved respiratory  variability.  No pericardial effusion is present.    PHYSICAL EXAM:   /70 (BP Location: Right arm, Patient Position: Chair, Cuff Size: Adult Regular)   Pulse 88   Ht 1.638 m (5' 4.5\")   Wt 58.1 kg (128 lb)   LMP 03/01/2021   SpO2 100%   BMI 21.63 kg/m    General: alert and oriented x 3, pleasant, no acute distress, normal mood and affect  Neuro: no focal deficits   CV: S1 S2, no murmurs, rubs or gallops, regular rate and rhythm, no peripheral edema  Pulm: bilateral breath sounds, clear to auscultation, easy work of breathing  Incision: incisions clean dry and intact without erythema, swelling or drainage    PROCEDURES: None       ASSESSMENT/PLAN:  Sissy Donaldson is a 35 year old year old female status post Pericardectomy who returns to clinic for postop visit.     1. Surgically doing well overall.  Incisions are healing well with no signs of infection. Increasing activity and strength overall.   2. Hemodynamics are stable. No medication changes were needed today.  3. Follow up with your cardiologist, Dr Parikh, as scheduled on 4/15/21.  4. Continue Outpatient Cardiac Rehab until completed.   5. Continue sternal precautions for 12 weeks from surgery date.   6. No driving for 4 weeks from surgery date.  7. Patient continues to have some moderate TR per " Echo performed today. Will send message to Dr. Jang to call patient to discuss early next week as he is out of town. Patient will also discuss with Cardiologist at next visit.        The total time spent with the patient was 25  minutes, > 50% of which was spent in counseling.    Laya Brown PA-C  Cardiothoracic Surgery  March 30, 2021      CC  Sydni Jang

## 2021-03-30 NOTE — NURSING NOTE
Chief Complaint   Patient presents with     Follow Up     Post OP TVR     Vitals were taken and medication reconciled.    LISA Pierre  12:47 PM

## 2021-04-07 ENCOUNTER — TELEPHONE (OUTPATIENT)
Dept: CARDIOLOGY | Facility: CLINIC | Age: 36
End: 2021-04-07

## 2021-04-07 NOTE — TELEPHONE ENCOUNTER
Patient called to ask if Dr Jang would call her to discuss her echocardiogram.  Patient is wondering if she needs another echo prior to her visit with Dr Parikh on 04/15.     Will ask Dr Jang to call patient.     Dr Jang called and spoke to the patient.  No need for additional echo prior to her visit with Dr Parikh.  Patient will follow up as scheduled on 04/15.

## 2021-04-08 ENCOUNTER — HOSPITAL ENCOUNTER (OUTPATIENT)
Dept: CARDIAC REHAB | Facility: CLINIC | Age: 36
End: 2021-04-08
Attending: SURGERY
Payer: COMMERCIAL

## 2021-04-08 DIAGNOSIS — Z98.890 HISTORY OF PERICARDIECTOMY: ICD-10-CM

## 2021-04-08 PROCEDURE — 93798 PHYS/QHP OP CAR RHAB W/ECG: CPT

## 2021-04-08 PROCEDURE — 93797 PHYS/QHP OP CAR RHAB WO ECG: CPT | Mod: XU

## 2021-04-12 ENCOUNTER — NURSE TRIAGE (OUTPATIENT)
Dept: NURSING | Facility: CLINIC | Age: 36
End: 2021-04-12

## 2021-04-12 LAB
FUNGUS SPEC CULT: NORMAL
SPECIMEN SOURCE: NORMAL

## 2021-04-13 ENCOUNTER — HOSPITAL ENCOUNTER (OUTPATIENT)
Dept: CARDIAC REHAB | Facility: CLINIC | Age: 36
End: 2021-04-13
Attending: SURGERY
Payer: COMMERCIAL

## 2021-04-13 PROCEDURE — 93798 PHYS/QHP OP CAR RHAB W/ECG: CPT

## 2021-04-13 NOTE — TELEPHONE ENCOUNTER
Patient reports she had heat surgery about 4 week ago. Tonight, patient reports scab came off by accident. Patient says there is a small hole with something black or blue in it. Denies bleeding. She wants to know what she should do.    Reviewed care advice with caller per RN triage protocol guideline. Advised to cover and call clinic tomorrow. Advised to call back with worsening symptoms/questions. Caller verbalized understanding.          Additional Information    Negative: [1] Major abdominal surgical wound AND [2] visible internal organs    Negative: Sounds like a life-threatening emergency to the triager    Negative: [1] Bleeding from wound AND [2] won't stop after 10 minutes of direct pressure    Negative: [1] Wound gaping open after sutures (or staples) AND [2] < 48 hours since wound re-opened    Negative: Patient sounds very sick or weak to the triager    Negative: [1] Wound gaping open after sutures (or staples) AND [2] > 48 hours since wound re-opened AND [3] length of opening > 1/2 inch (12 mm)    Negative: [1] Face wound gaping open after sutures (or staples) AND [2] > 48 hours since wound re-opened AND [3] length of opening > 1/4 inch (6 mm)    Negative: Suture or staple removal is overdue    Negative: [1] Suture (or staple) came out early AND [2] > 48 hours since sutures placed AND [3] caller wants wound checked    Negative: [1] Numbness extends beyond the wound edges AND [2] lasts > 8 hours    Wound care after sutures (or staples) removed, questions about    Negative: Care of sutured (or stapled) wound,  questions about    Negative: Suture (or staple) came out early    Protocols used: SUTURE OR STAPLE [id number]

## 2021-04-14 NOTE — PROGRESS NOTES
04/15/2021       Tawatchai Paisansinsup, MD [facility]    [address] [place] [address]      Elizabeth Bisinov, MD [facility]    [facility] [place] [address]      RE: Sissy Donaldson    MRN: [id number]   : 1985      Dear Colleagues:       I had the pleasure of seeing Sissy Donaldson in the [facility] in follow up.  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 Jang on 3/15/21. Here for follow up.     Her lupus history begins about 12 years ago when she had arthritis in several joints as well as ITP.  She was placed on several months of steroids and eventually was on hydroxychloroquine with good control.  Five years ago, she had her first pericardial effusion, which was treated with anti-inflammatories.  She has had a recurrence of pericardial effusion in the last 2 years and also had onset of dyspnea on exertion.  She had abdominal bloating and lower extremity edema as well as generalized fatigue.  She has had significant evaluation for this, including right and left heart catheterization as well as a cardiac MRI.  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  This was removed over a year ago without improvement in her abdominal edema.  She also had several different paracenteses that had demonstrated transudative fluid.  Of note, her filling pressures on her right heart catheterization were significantly elevated.  On her cardiac MRI, she had no fibrosis or evidence of infiltrative disease.      Given the concern for constriction she underwent complete pericardiectomy with Dr. Jang on 3/15/21. Post-operative TTE 3/18/2021 revealed severe TR w/ moderate RV dilation and mild  RVSD. She progressed well with diuresis and her TR reduced to mild to moderate TR per serial echocardiograms.     She is doing well post-surgery. Incision is healing well and she is going to cardiac rehab. Her weight has been stable between 129-131 lbs for the past 2 weeks. Her BP runs 90s-100s/60-70s at home.    Presently, she is off prednisone and just on hydroxychloroquine.  She has no other symptoms of lupus presently.      She denies PND or orthopnea, dizziness, syncope or presyncope. She is doing well in cardiac rehab and much more active than in the past. Denies chest pain, shortness of breath, HOUSER. Sternal wound is healing well without drainage.     PAST MEDICAL HISTORY:   1.  Lupus as detailed above.   2.  Pericardial effusions with recurrence and evidence of pericarditis.   3.  History of anxiety.   4.  GERD.      FAMILY HISTORY:  Notable for several family members with autoimmune disease on mother's side.  There is a cousin with rheumatoid arthritis, an aunt with psoriatic arthritis and a great aunt with lupus, again all on the mother's side.  Her siblings do not have any autoimmune disease.  No one else has a history of cardiac disease in the family.      SOCIAL HISTORY:  The patient is  and has a son who is around 12 that she shares half custody with.  She does not smoke, but does occasionally drink social alcohol.      Current Outpatient Medications   Medication Sig Dispense Refill     acetaminophen (TYLENOL) 325 MG tablet Take 1-2 tablets (325-650 mg) by mouth every 6 hours as needed for pain 50 tablet 0     cholecalciferol 50 MCG (2000 UT) tablet Take 2,000 Units by mouth every morning        hydroxychloroquine (PLAQUENIL) 200 MG tablet Take 200 mg by mouth every morning        metroNIDAZOLE (METROCREAM) 0.75 % external cream as needed        pantoprazole (PROTONIX) 40 MG EC tablet Take 1 tablet (40 mg) by mouth every morning (before breakfast) 30 tablet 1     potassium chloride ER (KLOR-CON  "M) 20 MEQ CR tablet Take 1 tablet (20 mEq) by mouth 2 times daily 60 tablet 0     senna-docusate (SENOKOT-S/PERICOLACE) 8.6-50 MG tablet Take 1 tablet by mouth 2 times daily as needed for constipation 30 tablet 0     sertraline (ZOLOFT) 100 MG tablet Take 100 mg by mouth every morning        spironolactone (ALDACTONE) 25 MG tablet Take 1 tablet (25 mg) by mouth daily 30 tablet 0     sulfacetamide sodium-sulfur 10-5 % LOTN as needed        torsemide (DEMADEX) 20 MG tablet Take 2 tablets (40 mg) by mouth 2 times daily 100 tablet 0             REVIEW OF SYSTEMS:     CONSTITUTIONAL:  No fevers, chills or sweats.  Weight fluctuates with weight gain, but she has not had unintentional weight loss.   ENT:  No visual disturbance, epistaxis or sore throat.   ALLERGIES:  Negative.   RESPIRATORY:  No cough or hemoptysis.   CARDIOVASCULAR:  As per HPI.   GASTROINTESTINAL:  No nausea, vomiting, hematemesis, melena or hematochezia.   GENITOURINARY:  No urinary frequency, dysuria or hematuria.   PSYCHIATRIC:  Does feel anxious when she cannot breathe.   NEUROLOGIC:  Negative.   ENDOCRINE:  Negative.   MUSCULOSKELETAL:  Negative.  No recent arthritis flares.      PHYSICAL EXAMINATION:   /71 (BP Location: Right arm, Patient Position: Chair, Cuff Size: Adult Small)   Pulse 81   Ht 1.638 m (5' 4.5\")   Wt 58.5 kg (129 lb)   SpO2 100%   BMI 21.80 kg/m    GENERAL:  The patient appears well and appears her stated age.  Breathing is comfortable at rest.   HEENT:  Moist mucous membranes.  No scleral icterus.   NECK:  No thyromegaly or masses.     CARDIAC:  RRR, 2/6 holosystolic murmur at LLSB. +S1/S2. JVP 4 cm. Sternal wound incision clean, dry intact. Most caudal part of incision is last to heal with small gap but no erythema or drainage.  LUNGS:  Clear to auscultation bilaterally.   ABDOMEN:  Soft, nontender, non distended.  EXTREMITIES:  No lower extremity edema.  Normal cap refill.   NEUROLOGIC:  Normal speech and affect.  No " gross deficits.  Normal gait.      TTE 3/22/2021  Global and regional left ventricular function is normal with an EF of 55-60%.  Global right ventricular function is mildly reduced.  Mild tricuspid insufficiency is present.  Dilation of the inferior vena cava is present with abnormal respiratory  variation in diameter.  Post pericardiectomy. No pericardial effusion is present.    TTE 3/30/2021  Global and regional left ventricular function is normal with an EF of 60-65%.  Global right ventricular function is normal.  Moderate tricuspid insufficiency is present.  Pulmonary artery systolic pressure is normal.  The inferior vena cava was normal in size with preserved respiratory  variability.  No pericardial effusion is present.    TTE 2/10/2021  There is pericardial tethering, septal bounce, and dilated IVC. While these  findings are supportive of pericardial constriction, other typical findings of  constriction (respirophasic septal shift, AV valve inflow velocities and  hepatic vein velocities suggestive of enhanced ventricular interaction, and  annulus reversus Tissue Doppler pattern) are not present.  Tissue Dopple e' velocities are normal, suggesting against restrictive  cardiomyopathy.     Left ventricular function, chamber size, wall motion, and wall thickness are  normal.The EF is 55-60%.  Right ventricular function, chamber size, wall motion, and thickness are  normal.  No significant valvular abnormalities were noted.  Previous study not available for comparison.  echocardiogram from 09/14/2020:  Normal LV size, normal LV function, ejection fraction 60%, normal RV size and function.  Cardiac valves are normal.  IVC is dilated.  No pericardial effusion.  Atrial sizes are also normal.  LV end-diastolic dimension of 4.3 cm.      Right heart catheterization from 2019 shows a right atrial pressure of 24, RV 35/22, wedge pressure of 22, pulmonary artery pressure 35/27, mean of 27, Madelin cardiac index 2.4.  Coronary  angiogram was not performed.      Cardiac MRI demonstrated normal LV size, ejection fraction 50%.  Normal RV size and function.  Pericardial thickness appears to be normal.  Trivial pericardial effusion.  No intraventricular interdependence.  With gadolinium, there was hyperenhancement of the pericardium suggestive of acute inflammatory pericarditis.  There was no evidence of infiltrative cardiomyopathy.  HIV is negative.  Rheumatoid factor is negative.  Double-stranded DNA is equivocal.  NT-proBNP is 145 and has been very low as well in the past.      ASSESSMENT AND RECOMMENDATIONS:  In summary, this is a very pleasant, 35-year-old woman with a long-standing history of lupus, and constrictive pericarditis complete pericardiectomy with Dr. Jang on 3/15/21. Here for follow up.    #Recurrent pericarditis c/b constriction s/p pericardiectomy  #SLE with pericardial involvement  #Mild to moderate TR  - continue aldactone 25mg daily  - wean torsemide 40mg BID to 40mg daily with 20mEq K  - will discuss with Rheumatology regarding transitioning her plaquinil to another med. Although she does not have myocardial involvement of her SLE given her pericardial involvement, this does concern me for the future.    RTC: 2 months with [facility] with an echo and CMP. Potentially discontinue diuretics at that time. 4 months with Dr. Skinner.    I do not think that she needs any other immune suppression at this time by her last Rheumatology note and absence of ongoing inflammation or chest pain.      Renee Kim MD  Cardiology Fellow    Please feel free to call me if you have any further questions.       Sincerely,        I have seen and examined the patient with the house staff on April 15, 2021  and agree with the outlined assessment and plan.      Kamran Skinner MD  Associate Professor of Medicine   [facility] Division of Cardiology       Kamran Skinner MD   Cardiology Staff         KAMRAN SKINNER MD

## 2021-04-15 ENCOUNTER — OFFICE VISIT (OUTPATIENT)
Dept: CARDIOLOGY | Facility: CLINIC | Age: 36
End: 2021-04-15
Attending: INTERNAL MEDICINE
Payer: COMMERCIAL

## 2021-04-15 VITALS
OXYGEN SATURATION: 100 % | WEIGHT: 129 LBS | HEART RATE: 81 BPM | DIASTOLIC BLOOD PRESSURE: 71 MMHG | SYSTOLIC BLOOD PRESSURE: 126 MMHG | HEIGHT: 65 IN | BODY MASS INDEX: 21.49 KG/M2

## 2021-04-15 DIAGNOSIS — I50.32 CHRONIC DIASTOLIC CONGESTIVE HEART FAILURE (H): Primary | ICD-10-CM

## 2021-04-15 DIAGNOSIS — Z98.890 HISTORY OF PERICARDIECTOMY: ICD-10-CM

## 2021-04-15 DIAGNOSIS — I50.32 CHRONIC DIASTOLIC CONGESTIVE HEART FAILURE (H): ICD-10-CM

## 2021-04-15 LAB
ANION GAP SERPL CALCULATED.3IONS-SCNC: 5 MMOL/L (ref 3–14)
BUN SERPL-MCNC: 14 MG/DL (ref 7–30)
CALCIUM SERPL-MCNC: 9.2 MG/DL (ref 8.5–10.1)
CHLORIDE SERPL-SCNC: 99 MMOL/L (ref 94–109)
CO2 SERPL-SCNC: 34 MMOL/L (ref 20–32)
CREAT SERPL-MCNC: 0.74 MG/DL (ref 0.52–1.04)
GFR SERPL CREATININE-BSD FRML MDRD: >90 ML/MIN/{1.73_M2}
GLUCOSE SERPL-MCNC: 88 MG/DL (ref 70–99)
NT-PROBNP SERPL-MCNC: 245 PG/ML (ref 0–125)
POTASSIUM SERPL-SCNC: 4.1 MMOL/L (ref 3.4–5.3)
SODIUM SERPL-SCNC: 138 MMOL/L (ref 133–144)

## 2021-04-15 PROCEDURE — 36415 COLL VENOUS BLD VENIPUNCTURE: CPT | Performed by: PATHOLOGY

## 2021-04-15 PROCEDURE — G0463 HOSPITAL OUTPT CLINIC VISIT: HCPCS

## 2021-04-15 PROCEDURE — 80048 BASIC METABOLIC PNL TOTAL CA: CPT | Performed by: PATHOLOGY

## 2021-04-15 PROCEDURE — 83880 ASSAY OF NATRIURETIC PEPTIDE: CPT | Performed by: PATHOLOGY

## 2021-04-15 PROCEDURE — 99215 OFFICE O/P EST HI 40 MIN: CPT | Mod: GC | Performed by: INTERNAL MEDICINE

## 2021-04-15 RX ORDER — SPIRONOLACTONE 25 MG/1
25 TABLET ORAL DAILY
Qty: 90 TABLET | Refills: 3 | Status: SHIPPED | OUTPATIENT
Start: 2021-04-15 | End: 2022-07-12

## 2021-04-15 RX ORDER — POTASSIUM CHLORIDE 1500 MG/1
20 TABLET, EXTENDED RELEASE ORAL DAILY
Qty: 90 TABLET | Refills: 1 | Status: SHIPPED | OUTPATIENT
Start: 2021-04-15

## 2021-04-15 RX ORDER — TORSEMIDE 20 MG/1
40 TABLET ORAL DAILY
Qty: 180 TABLET | Refills: 1 | Status: SHIPPED | OUTPATIENT
Start: 2021-04-15 | End: 2021-04-19

## 2021-04-15 ASSESSMENT — PAIN SCALES - GENERAL: PAINLEVEL: NO PAIN (0)

## 2021-04-15 ASSESSMENT — MIFFLIN-ST. JEOR: SCORE: 1273.08

## 2021-04-15 NOTE — PATIENT INSTRUCTIONS
"Cardiology Providers you saw during your visit:  Dr. Parikh    Medication changes:  1.  Decrease Lasix to 40 mg daily  2. Decrease potassium to 20 mEq daily    Follow up:  1.  New CORE appointment in 2 months with an echo and labs prior    June 18th  1:30 labs  2:00 Echo  3:00 Thad NP    2.  Follow up with Dr Parikh in 6 months with labs prior    Labs:  Results for JOVAN GARNETT (MRN [id number]) as of 4/15/2021 11:33   Ref. Range 4/15/2021 10:49   Sodium Latest Ref Range: 133 - 144 mmol/L 138   Potassium Latest Ref Range: 3.4 - 5.3 mmol/L 4.1   Chloride Latest Ref Range: 94 - 109 mmol/L 99   Carbon Dioxide Latest Ref Range: 20 - 32 mmol/L 34 (H)   Urea Nitrogen Latest Ref Range: 7 - 30 mg/dL 14   Creatinine Latest Ref Range: 0.52 - 1.04 mg/dL 0.74   GFR Estimate Latest Ref Range: >60 mL/min/1.73_m2 >90   GFR Estimate If Black Latest Ref Range: >60 mL/min/1.73_m2 >90   Calcium Latest Ref Range: 8.5 - 10.1 mg/dL 9.2   Anion Gap Latest Ref Range: 3 - 14 mmol/L 5   N-Terminal Pro Bnp Latest Ref Range: 0 - 125 pg/mL 245 (H)   Glucose Latest Ref Range: 70 - 99 mg/dL 88       Please call if you have :  1. Weight gain of more than 2 pounds in a day or 5 pounds in a week  2. Increased shortness of breath, swelling or bloating  3. Dizziness, lightheadedness   4. Any questions or concerns.       Follow the American Heart Association Diet and Lifestyle recommendations:  Limit saturated fat, trans fat, sodium, red meat, sweets and sugar-sweetened beverages. If you choose to eat red meat, compare labels and select the leanest cuts available.  Aim for at least 150 minutes of moderate physical activity or 75 minutes of vigorous physical activity - or an equal combination of both - each week.      During business hours: [phone], press option # 1 to be routed to the [employer] then option # 4 for \"To send a message to your care team\"      After hours, weekends or holidays: On Call Cardiologist- [phone]   option " "#4 and ask to speak to the on-call Cardiologist. Inform them you are a CORE/heart failure patient at the [facility].      Heart Failure Support Group  [facility]  The Board Room, 8th Floor  [address] [address]     Meetings   1-2 pm  , 1-2 p.m.  , 1-2 p.m.  , 1-2 p.m.  , 1-2 p.m.  May 3rd, 1-2 p.m.  , 1-2 p.m.  , 1-2 p.m.  , 1-2 p.m.  , 1-2 p.m.  , 1-2 p.m.  , 1-2 p.m.  , 1-2 p.m.      Alexus Vivar RN BSN CHFN  Cardiology Care Coordinator - Heart Failure/ C.O.R.E. Clinic  [facility]   Questions and [phone]   First press #1 for the [facility] and then press #4 for \"To send a message to your care team\"    "

## 2021-04-15 NOTE — LETTER
4/15/2021      RE: Sissy Donaldson  [address]  [facility] [address]       04/15/2021       MD [facility]    [address] [place] [address]      Elizabeth Bisinov, MD [facility]    [address] [place] [address]      RE: Sissy Donaldson    MRN: [id number]   : 1985      Dear Colleagues:       I had the pleasure of seeing Sissy Donaldson in the [facility] in follow up.  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 Jang on 3/15/21. Here for follow up.     Her lupus history begins about 12 years ago when she had arthritis in several joints as well as ITP.  She was placed on several months of steroids and eventually was on hydroxychloroquine with good control.  Five years ago, she had her first pericardial effusion, which was treated with anti-inflammatories.  She has had a recurrence of pericardial effusion in the last 2 years and also had onset of dyspnea on exertion.  She had abdominal bloating and lower extremity edema as well as generalized fatigue.  She has had significant evaluation for this, including right and left heart catheterization as well as a cardiac MRI.  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  This was removed over a year ago without improvement in her abdominal edema.  She also had several different paracenteses that had demonstrated transudative fluid.  Of note, her filling pressures on her right heart catheterization were significantly elevated.  On her cardiac MRI, she had no fibrosis or evidence of infiltrative disease.      Given the concern for constriction she underwent complete pericardiectomy with Dr. Jang on  3/15/21. Post-operative TTE 3/18/2021 revealed severe TR w/ moderate RV dilation and mild RVSD. She progressed well with diuresis and her TR reduced to mild to moderate TR per serial echocardiograms.     She is doing well post-surgery. Incision is healing well and she is going to cardiac rehab. Her weight has been stable between 129-131 lbs for the past 2 weeks. Her BP runs 90s-100s/60-70s at home.    Presently, she is off prednisone and just on hydroxychloroquine.  She has no other symptoms of lupus presently.      She denies PND or orthopnea, dizziness, syncope or presyncope. She is doing well in cardiac rehab and much more active than in the past. Denies chest pain, shortness of breath, HOUSER. Sternal wound is healing well without drainage.     PAST MEDICAL HISTORY:   1.  Lupus as detailed above.   2.  Pericardial effusions with recurrence and evidence of pericarditis.   3.  History of anxiety.   4.  GERD.      FAMILY HISTORY:  Notable for several family members with autoimmune disease on mother's side.  There is a cousin with rheumatoid arthritis, an aunt with psoriatic arthritis and a great aunt with lupus, again all on the mother's side.  Her siblings do not have any autoimmune disease.  No one else has a history of cardiac disease in the family.      SOCIAL HISTORY:  The patient is  and has a son who is around 12 that she shares half custody with.  She does not smoke, but does occasionally drink social alcohol.      Current Outpatient Medications   Medication Sig Dispense Refill     acetaminophen (TYLENOL) 325 MG tablet Take 1-2 tablets (325-650 mg) by mouth every 6 hours as needed for pain 50 tablet 0     cholecalciferol 50 MCG (2000 UT) tablet Take 2,000 Units by mouth every morning        hydroxychloroquine (PLAQUENIL) 200 MG tablet Take 200 mg by mouth every morning        metroNIDAZOLE (METROCREAM) 0.75 % external cream as needed        pantoprazole (PROTONIX) 40 MG EC tablet Take 1 tablet (40 mg)  "by mouth every morning (before breakfast) 30 tablet 1     potassium chloride ER (KLOR-CON M) 20 MEQ CR tablet Take 1 tablet (20 mEq) by mouth 2 times daily 60 tablet 0     senna-docusate (SENOKOT-S/PERICOLACE) 8.6-50 MG tablet Take 1 tablet by mouth 2 times daily as needed for constipation 30 tablet 0     sertraline (ZOLOFT) 100 MG tablet Take 100 mg by mouth every morning        spironolactone (ALDACTONE) 25 MG tablet Take 1 tablet (25 mg) by mouth daily 30 tablet 0     sulfacetamide sodium-sulfur 10-5 % LOTN as needed        torsemide (DEMADEX) 20 MG tablet Take 2 tablets (40 mg) by mouth 2 times daily 100 tablet 0             REVIEW OF SYSTEMS:     CONSTITUTIONAL:  No fevers, chills or sweats.  Weight fluctuates with weight gain, but she has not had unintentional weight loss.   ENT:  No visual disturbance, epistaxis or sore throat.   ALLERGIES:  Negative.   RESPIRATORY:  No cough or hemoptysis.   CARDIOVASCULAR:  As per HPI.   GASTROINTESTINAL:  No nausea, vomiting, hematemesis, melena or hematochezia.   GENITOURINARY:  No urinary frequency, dysuria or hematuria.   PSYCHIATRIC:  Does feel anxious when she cannot breathe.   NEUROLOGIC:  Negative.   ENDOCRINE:  Negative.   MUSCULOSKELETAL:  Negative.  No recent arthritis flares.      PHYSICAL EXAMINATION:   /71 (BP Location: Right arm, Patient Position: Chair, Cuff Size: Adult Small)   Pulse 81   Ht 1.638 m (5' 4.5\")   Wt 58.5 kg (129 lb)   SpO2 100%   BMI 21.80 kg/m    GENERAL:  The patient appears well and appears her stated age.  Breathing is comfortable at rest.   HEENT:  Moist mucous membranes.  No scleral icterus.   NECK:  No thyromegaly or masses.     CARDIAC:  RRR, 2/6 holosystolic murmur at LLSB. +S1/S2. JVP 4 cm. Sternal wound incision clean, dry intact. Most caudal part of incision is last to heal with small gap but no erythema or drainage.  LUNGS:  Clear to auscultation bilaterally.   ABDOMEN:  Soft, nontender, non distended.  EXTREMITIES:  " No lower extremity edema.  Normal cap refill.   NEUROLOGIC:  Normal speech and affect.  No gross deficits.  Normal gait.      TTE 3/22/2021  Global and regional left ventricular function is normal with an EF of 55-60%.  Global right ventricular function is mildly reduced.  Mild tricuspid insufficiency is present.  Dilation of the inferior vena cava is present with abnormal respiratory  variation in diameter.  Post pericardiectomy. No pericardial effusion is present.    TTE 3/30/2021  Global and regional left ventricular function is normal with an EF of 60-65%.  Global right ventricular function is normal.  Moderate tricuspid insufficiency is present.  Pulmonary artery systolic pressure is normal.  The inferior vena cava was normal in size with preserved respiratory  variability.  No pericardial effusion is present.    TTE 2/10/2021  There is pericardial tethering, septal bounce, and dilated IVC. While these  findings are supportive of pericardial constriction, other typical findings of  constriction (respirophasic septal shift, AV valve inflow velocities and  hepatic vein velocities suggestive of enhanced ventricular interaction, and  annulus reversus Tissue Doppler pattern) are not present.  Tissue Dopple e' velocities are normal, suggesting against restrictive  cardiomyopathy.     Left ventricular function, chamber size, wall motion, and wall thickness are  normal.The EF is 55-60%.  Right ventricular function, chamber size, wall motion, and thickness are  normal.  No significant valvular abnormalities were noted.  Previous study not available for comparison.  echocardiogram from 09/14/2020:  Normal LV size, normal LV function, ejection fraction 60%, normal RV size and function.  Cardiac valves are normal.  IVC is dilated.  No pericardial effusion.  Atrial sizes are also normal.  LV end-diastolic dimension of 4.3 cm.      Right heart catheterization from 2019 shows a right atrial pressure of 24, RV 35/22, wedge  pressure of 22, pulmonary artery pressure 35/27, mean of 27, Madelin cardiac index 2.4.  Coronary angiogram was not performed.      Cardiac MRI demonstrated normal LV size, ejection fraction 50%.  Normal RV size and function.  Pericardial thickness appears to be normal.  Trivial pericardial effusion.  No intraventricular interdependence.  With gadolinium, there was hyperenhancement of the pericardium suggestive of acute inflammatory pericarditis.  There was no evidence of infiltrative cardiomyopathy.  HIV is negative.  Rheumatoid factor is negative.  Double-stranded DNA is equivocal.  NT-proBNP is 145 and has been very low as well in the past.      ASSESSMENT AND RECOMMENDATIONS:  In summary, this is a very pleasant, 35-year-old woman with a long-standing history of lupus, and constrictive pericarditis complete pericardiectomy with Dr. Jang on 3/15/21. Here for follow up.    #Recurrent pericarditis c/b constriction s/p pericardiectomy  #SLE with pericardial involvement  #Mild to moderate TR  - continue aldactone 25mg daily  - wean torsemide 40mg BID to 40mg daily with 20mEq K  - will discuss with Rheumatology regarding transitioning her plaquinil to another med. Although she does not have myocardial involvement of her SLE given her pericardial involvement, this does concern me for the future.    RTC: 2 months with [facility] with an echo and CMP. Potentially discontinue diuretics at that time. 4 months with Dr. Parikh.    I do not think that she needs any other immune suppression at this time by her last Rheumatology note and absence of ongoing inflammation or chest pain.      Renee Kim MD  Cardiology Fellow    Please feel free to call me if you have any further questions.       Sincerely,        I have seen and examined the patient with the house staff on April 15, 2021  and agree with the outlined assessment and plan.      Xuan Parikh MD  Associate Professor of Medicine   [facility] Division of  Cardiology       Xuan Parikh MD   Cardiology Staff

## 2021-04-15 NOTE — NURSING NOTE
Diet: Patient instructed regarding a heart failure healthy diet, including discussion of reduced fat and 2000 mg daily sodium restriction, daily weights, medication purpose and compliance, fluid restrictions and resources for patient and family to access for assistance with heart failure management.       Labs: Patient was given results of the laboratory testing obtained today and patient was instructed about when to return for the next laboratory testing.     Med Reconcile: Reviewed and verified all current medications with the patient. The updated medication list was printed and given to the patient.     Med changes: decrease lasix to 40 mg daily, decrease potassium to 20 mEq     Return Appointment: Patient given instructions regarding scheduling next clinic visit: [facility] in 2 months with labs and echo, dr mina in 6 months    Patient stated she understood all health information given and agreed to call with further questions or concerns.     Alexus Vivar RN

## 2021-04-15 NOTE — LETTER
4/15/2021      RE: Sissy Andersonczmarek  [address]  [facility] [address]       Dear Colleague,    Thank you for the opportunity to participate in the care of your patient, Sissy Donaldson, at the [facility] [place] [facility]. Please see a copy of my visit note below.    04/15/2021       Jh Garcia MD   [facility]    [address] [id number]      Patria Iyer MD   [facility]    [address] [phone]      RE: Sissy Donaldson    MRN: [id number]   : 1985      Dear Colleagues:       I had the pleasure of seeing Sissy Donaldson in the [facility] in follow up.  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 Jang on 3/15/21. Here for follow up.     Her lupus history begins about 12 years ago when she had arthritis in several joints as well as ITP.  She was placed on several months of steroids and eventually was on hydroxychloroquine with good control.  Five years ago, she had her first pericardial effusion, which was treated with anti-inflammatories.  She has had a recurrence of pericardial effusion in the last 2 years and also had onset of dyspnea on exertion.  She had abdominal bloating and lower extremity edema as well as generalized fatigue.  She has had significant evaluation for this, including right and left heart catheterization as well as a cardiac MRI.  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  This was removed over a year ago without improvement in her abdominal edema.  She also had several different paracenteses that had demonstrated transudative fluid.  Of  note, her filling pressures on her right heart catheterization were significantly elevated.  On her cardiac MRI, she had no fibrosis or evidence of infiltrative disease.      Given the concern for constriction she underwent complete pericardiectomy with Dr. Jang on 3/15/21. Post-operative TTE 3/18/2021 revealed severe TR w/ moderate RV dilation and mild RVSD. She progressed well with diuresis and her TR reduced to mild to moderate TR per serial echocardiograms.     She is doing well post-surgery. Incision is healing well and she is going to cardiac rehab. Her weight has been stable between 129-131 lbs for the past 2 weeks. Her BP runs 90s-100s/60-70s at home.    Presently, she is off prednisone and just on hydroxychloroquine.  She has no other symptoms of lupus presently.      She denies PND or orthopnea, dizziness, syncope or presyncope. She is doing well in cardiac rehab and much more active than in the past. Denies chest pain, shortness of breath, HOUSER. Sternal wound is healing well without drainage.     PAST MEDICAL HISTORY:   1.  Lupus as detailed above.   2.  Pericardial effusions with recurrence and evidence of pericarditis.   3.  History of anxiety.   4.  GERD.      FAMILY HISTORY:  Notable for several family members with autoimmune disease on mother's side.  There is a cousin with rheumatoid arthritis, an aunt with psoriatic arthritis and a great aunt with lupus, again all on the mother's side.  Her siblings do not have any autoimmune disease.  No one else has a history of cardiac disease in the family.      SOCIAL HISTORY:  The patient is  and has a son who is around 12 that she shares half custody with.  She does not smoke, but does occasionally drink social alcohol.      Current Outpatient Medications   Medication Sig Dispense Refill     acetaminophen (TYLENOL) 325 MG tablet Take 1-2 tablets (325-650 mg) by mouth every 6 hours as needed for pain 50 tablet 0     cholecalciferol 50 MCG (2000 UT)  "tablet Take 2,000 Units by mouth every morning        hydroxychloroquine (PLAQUENIL) 200 MG tablet Take 200 mg by mouth every morning        metroNIDAZOLE (METROCREAM) 0.75 % external cream as needed        pantoprazole (PROTONIX) 40 MG EC tablet Take 1 tablet (40 mg) by mouth every morning (before breakfast) 30 tablet 1     potassium chloride ER (KLOR-CON M) 20 MEQ CR tablet Take 1 tablet (20 mEq) by mouth 2 times daily 60 tablet 0     senna-docusate (SENOKOT-S/PERICOLACE) 8.6-50 MG tablet Take 1 tablet by mouth 2 times daily as needed for constipation 30 tablet 0     sertraline (ZOLOFT) 100 MG tablet Take 100 mg by mouth every morning        spironolactone (ALDACTONE) 25 MG tablet Take 1 tablet (25 mg) by mouth daily 30 tablet 0     sulfacetamide sodium-sulfur 10-5 % LOTN as needed        torsemide (DEMADEX) 20 MG tablet Take 2 tablets (40 mg) by mouth 2 times daily 100 tablet 0             REVIEW OF SYSTEMS:     CONSTITUTIONAL:  No fevers, chills or sweats.  Weight fluctuates with weight gain, but she has not had unintentional weight loss.   ENT:  No visual disturbance, epistaxis or sore throat.   ALLERGIES:  Negative.   RESPIRATORY:  No cough or hemoptysis.   CARDIOVASCULAR:  As per HPI.   GASTROINTESTINAL:  No nausea, vomiting, hematemesis, melena or hematochezia.   GENITOURINARY:  No urinary frequency, dysuria or hematuria.   PSYCHIATRIC:  Does feel anxious when she cannot breathe.   NEUROLOGIC:  Negative.   ENDOCRINE:  Negative.   MUSCULOSKELETAL:  Negative.  No recent arthritis flares.      PHYSICAL EXAMINATION:   /71 (BP Location: Right arm, Patient Position: Chair, Cuff Size: Adult Small)   Pulse 81   Ht 1.638 m (5' 4.5\")   Wt 58.5 kg (129 lb)   SpO2 100%   BMI 21.80 kg/m    GENERAL:  The patient appears well and appears her stated age.  Breathing is comfortable at rest.   HEENT:  Moist mucous membranes.  No scleral icterus.   NECK:  No thyromegaly or masses.     CARDIAC:  RRR, 2/6 holosystolic " murmur at LLSB. +S1/S2. JVP 4 cm. Sternal wound incision clean, dry intact. Most caudal part of incision is last to heal with small gap but no erythema or drainage.  LUNGS:  Clear to auscultation bilaterally.   ABDOMEN:  Soft, nontender, non distended.  EXTREMITIES:  No lower extremity edema.  Normal cap refill.   NEUROLOGIC:  Normal speech and affect.  No gross deficits.  Normal gait.      TTE 3/22/2021  Global and regional left ventricular function is normal with an EF of 55-60%.  Global right ventricular function is mildly reduced.  Mild tricuspid insufficiency is present.  Dilation of the inferior vena cava is present with abnormal respiratory  variation in diameter.  Post pericardiectomy. No pericardial effusion is present.    TTE 3/30/2021  Global and regional left ventricular function is normal with an EF of 60-65%.  Global right ventricular function is normal.  Moderate tricuspid insufficiency is present.  Pulmonary artery systolic pressure is normal.  The inferior vena cava was normal in size with preserved respiratory  variability.  No pericardial effusion is present.    TTE 2/10/2021  There is pericardial tethering, septal bounce, and dilated IVC. While these  findings are supportive of pericardial constriction, other typical findings of  constriction (respirophasic septal shift, AV valve inflow velocities and  hepatic vein velocities suggestive of enhanced ventricular interaction, and  annulus reversus Tissue Doppler pattern) are not present.  Tissue Dopple e' velocities are normal, suggesting against restrictive  cardiomyopathy.     Left ventricular function, chamber size, wall motion, and wall thickness are  normal.The EF is 55-60%.  Right ventricular function, chamber size, wall motion, and thickness are  normal.  No significant valvular abnormalities were noted.  Previous study not available for comparison.  echocardiogram from 09/14/2020:  Normal LV size, normal LV function, ejection fraction 60%,  normal RV size and function.  Cardiac valves are normal.  IVC is dilated.  No pericardial effusion.  Atrial sizes are also normal.  LV end-diastolic dimension of 4.3 cm.      Right heart catheterization from 2019 shows a right atrial pressure of 24, RV 35/22, wedge pressure of 22, pulmonary artery pressure 35/27, mean of 27, Madelin cardiac index 2.4.  Coronary angiogram was not performed.      Cardiac MRI demonstrated normal LV size, ejection fraction 50%.  Normal RV size and function.  Pericardial thickness appears to be normal.  Trivial pericardial effusion.  No intraventricular interdependence.  With gadolinium, there was hyperenhancement of the pericardium suggestive of acute inflammatory pericarditis.  There was no evidence of infiltrative cardiomyopathy.  HIV is negative.  Rheumatoid factor is negative.  Double-stranded DNA is equivocal.  NT-proBNP is 145 and has been very low as well in the past.      ASSESSMENT AND RECOMMENDATIONS:  In summary, this is a very pleasant, 35-year-old woman with a long-standing history of lupus, and constrictive pericarditis complete pericardiectomy with Dr. Jang on 3/15/21. Here for follow up.    #Recurrent pericarditis c/b constriction s/p pericardiectomy  #SLE with pericardial involvement  #Mild to moderate TR  - continue aldactone 25mg daily  - wean torsemide 40mg BID to 40mg daily with 20mEq K  - will discuss with Rheumatology regarding transitioning her plaquinil to another med. Although she does not have myocardial involvement of her SLE given her pericardial involvement, this does concern me for the future.    RTC: 2 months with [facility] with an echo and CMP. Potentially discontinue diuretics at that time. 4 months with Dr. Parikh.    I do not think that she needs any other immune suppression at this time by her last Rheumatology note and absence of ongoing inflammation or chest pain.      Renee Kim MD  Cardiology Fellow    Please feel free to call me if you have any  further questions.       Sincerely,        I have seen and examined the patient with the house staff on April 15, 2021  and agree with the outlined assessment and plan.      Kamran Skinner MD  Associate Professor of Medicine   [facility] Division of Cardiology       Kamran Skinner MD   Cardiology Staff         KAMRAN SKINNER MD              Please do not hesitate to contact me if you have any questions/concerns.     Sincerely,     Kamran Skinner MD

## 2021-04-15 NOTE — NURSING NOTE
Chief Complaint   Patient presents with     Follow Up     RHF,  constrictive idiopathic pericarditis labs prior     Vitals were taken and medications where reconciled.   Josue Roche, EMT  11:08 AM

## 2021-04-19 ENCOUNTER — MYC MEDICAL ADVICE (OUTPATIENT)
Dept: CARDIOLOGY | Facility: CLINIC | Age: 36
End: 2021-04-19

## 2021-04-19 DIAGNOSIS — Z98.890 HISTORY OF PERICARDIECTOMY: ICD-10-CM

## 2021-04-19 RX ORDER — TORSEMIDE 20 MG/1
60 TABLET ORAL DAILY
Qty: 270 TABLET | Refills: 3 | Status: SHIPPED | OUTPATIENT
Start: 2021-04-19 | End: 2021-04-30

## 2021-04-19 NOTE — TELEPHONE ENCOUNTER
Date: 4/19/2021    Time of Call: 3:01 PM     Diagnosis:  Heart failure     [ VORB ] Ordering provider: Dr Parikh    Order: torsemide 60 mg daily     Order received by: Alexus Vivar RN       Follow-up/additional notes: sent [employer] message to Ashli

## 2021-04-22 ENCOUNTER — HOSPITAL ENCOUNTER (OUTPATIENT)
Dept: CARDIAC REHAB | Facility: CLINIC | Age: 36
End: 2021-04-22
Attending: SURGERY
Payer: COMMERCIAL

## 2021-04-22 PROCEDURE — 93798 PHYS/QHP OP CAR RHAB W/ECG: CPT

## 2021-04-23 ENCOUNTER — TELEPHONE (OUTPATIENT)
Dept: CARDIOLOGY | Facility: CLINIC | Age: 36
End: 2021-04-23

## 2021-04-23 NOTE — TELEPHONE ENCOUNTER
Called patient to change her appointment to Tuesday, unable to leave voicemail message as message box is full.     Sent patient a text message, waiting return call or will call later.    Patient called back and appointment changed to 04/27.     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  The surgeon wanted us to know the suture was blue and several inches long with several knots in the suture.  The dermatology surgeon said the patient's body was rejecting the suture.  Patient's appointment for 04/27 was canceled.  Instructed patient to watch for any signs of infection; redness, swelling, warmth or drainage and call if she notices any symptoms.  Patient verbalized understanding and will call with any further questions or concerns.

## 2021-04-27 ENCOUNTER — HOSPITAL ENCOUNTER (OUTPATIENT)
Dept: CARDIAC REHAB | Facility: CLINIC | Age: 36
End: 2021-04-27
Attending: SURGERY
Payer: COMMERCIAL

## 2021-04-27 PROCEDURE — 93798 PHYS/QHP OP CAR RHAB W/ECG: CPT

## 2021-04-29 ENCOUNTER — MYC MEDICAL ADVICE (OUTPATIENT)
Dept: CARDIOLOGY | Facility: CLINIC | Age: 36
End: 2021-04-29

## 2021-04-29 ENCOUNTER — PATIENT OUTREACH (OUTPATIENT)
Dept: CARDIOLOGY | Facility: CLINIC | Age: 36
End: 2021-04-29

## 2021-04-29 DIAGNOSIS — Z98.890 HISTORY OF PERICARDIECTOMY: ICD-10-CM

## 2021-04-30 RX ORDER — TORSEMIDE 20 MG/1
40 TABLET ORAL DAILY
Qty: 180 TABLET | Refills: 3 | Status: SHIPPED | OUTPATIENT
Start: 2021-04-30 | End: 2022-08-29

## 2021-04-30 NOTE — TELEPHONE ENCOUNTER
Date: 4/30/2021    Time of Call: 11:56 AM     Diagnosis:  Heart failure     [ VORB ] Ordering provider: Dr Parikh    Order: decreases torsemide to 40 mg daily     Order received by: Alexus Vivar RN       Follow-up/additional notes: sent [employer] message

## 2021-05-05 NOTE — H&P
CV ICU H&P  3/15/2021        CO-MORBIDITIES:       Patient Active Problem List   Diagnosis     Hydrosalpinx     Chronic diastolic congestive heart failure (H)     SANTIAGO (generalized anxiety disorder)     History of ITP     Lack of immunity to hepatitis B virus demonstrated by serologic test     Other ascites     Pericardial effusion     Pericarditis     Recurrent major depressive disorder in partial remission (H)     Systemic lupus erythematosus (H)     Restrictive cardiomyopathy (H)     Status post coronary angiogram     History of pericardiectomy         ASSESSMENT: Sissy Donaldson is a 35yoF with a 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 Jang on 3/15/21.     PLAN:  Neuro/ pain/ sedation:  Acute post-operative pain  Generalized Anxiety Disorder  - Monitor neurological status. Notify the MD for any acute changes in exam.  - Fentanyl 25-50 mcg q2 hrs PRN  - Oxycodone 5-10 mg q3 hrs PRN   - Scheduled APAP 975 mg q8 hrs   - Restart PTA sertraline 100 mg every day  - Bilateral erector spinae catheters, RAPS team following, recs appreciated      Pulmonary care:   - Titrate FiO2 for SpO2 >92%  - NC at 4LPM  AB.32/56/44/29     Cardiovascular:    Hx constrictive pericarditis  - Monitor hemodynamic status.   - MAP goal > 65  - Plan for TTE in 3 days per Dr. Jang     GI /Nutrition:  PONV   - Advance Diet as Tolerated: Low Saturated Fat, Na <2400mg   - Zofran 4mg q6 hrs PRN  - Droperidol 0.625 mg IV once  - Bowel regimen with senna BID, miralax daily     Fluids/ Electrolytes/ Renal:   - Soria for strict I&Os  - Hold PTA spironolactone  - Hold PTA torsemide     Endocrine:    Stress hyperglycemia  - Insulin gtt     ID/ Antibiotics:  - Perioperative antibiotics with ancef  - Pericardial tissue Cx NGTD     Heme:     Hx lupus  Acute blood loss anemia  - Hgb goal >7  - Hold PTA plaquenil 200 mg every day     Prophylaxis:    -  "SCDs  - Bowel regimen  - PPI     Lines/ tubes/ drains:  - CVC  - PIV x2  - Arterial line  - Soria  - Mediastinal tube x2  - Pleural tube x2     Disposition:  - CV ICU      Silverio Stark, MS4  *[id number]     This patient was seen and discussed with CV ICU attending Dr. Epps.     \"I was present with the medical student who participated in the service and in the documentation of the note. I have verified the history and personally performed the physical exam and medical decision making. I agree with the assessment and plan of care as documented in the note.\"    Laure Mijares  Anesthesia Resident CA2     ====================================     HPI:   35yoF with a history of lupus with recurrent pericarditis and pericardial effusions and constrictive pericarditis now s/p pericardiectomy with Dr. Jang on 3/15/21.     Intraoperative course notable for EBL of 700mL. She received 240mL cellsaver, 1100 ml of crystalloid. UOP was 625 ml after a total of 40 mg IV lasix. Her potassium was replaced intraoperatively with a total of 40 mEq. Of note pre-operative MIKE showed trace TR, post-op MIKE showed severe TR.      PAST MEDICAL HISTORY:   Past Medical History        Past Medical History:   Diagnosis Date     SLE (systemic lupus erythematosus related syndrome) (H)              PAST SURGICAL HISTORY:   Past Surgical History         Past Surgical History:   Procedure Laterality Date     CV CORONARY ANGIOGRAM N/A 1/12/2021     Procedure: CV CORONARY ANGIOGRAM;  Surgeon: Justin Ortega MD;  [facility]     CV LEFT HEART CATH N/A 1/12/2021     Procedure: CV LEFT HEART CATH;  Surgeon: Justin Ortega MD;  [facility]     CV RIGHT HEART CATH MEASUREMENTS RECORDED N/A 1/12/2021     Procedure: CV RIGHT HEART CATH;  Surgeon: Justin Ortega MD;  [facility]     wisdom teeth                FAMILY HISTORY:   Family History         Family History "   Problem Relation Age of Onset     Breast Cancer Paternal Grandmother       Heart Disease Maternal Grandfather              SOCIAL HISTORY:   Social History            Tobacco Use     Smoking status: Never Smoker     Smokeless tobacco: Never Used   Substance Use Topics     Alcohol use: Yes       Alcohol/week: 0.0 standard drinks       Comment: rarely          OBJECTIVE:   1. VITAL SIGNS:      Temp: (P) 97  F (36.1  C) Temp src: (P) Pulmonary Artery BP: 103/66 Pulse: 70   Resp: (P) 16 SpO2: 95 % O2 Device: None (Room air) Oxygen Delivery: 4 LPM     2. INTAKE/ OUTPUT:      Intake/Output Summary (Last 24 hours) at 3/15/2021 1600  Last data filed at 3/15/2021 1600      Gross per 24 hour   Intake 1657 ml   Output 2470 ml   Net -813 ml         3. PHYSICAL EXAMINATION:   General: NAD  Neuro: Alert, fully directable   Resp: Breathing non-labored  CV: RRR  Abdomen: Soft, Non-distended  Extremities: warm and well perfused     4. INVESTIGATIONS:   Arterial Blood Gases         Recent Labs   Lab 03/15/21  1334 03/15/21  1157 03/15/21  0843   PH 7.35 7.40 7.52*   PCO2 49* 40 33*   PO2 136* 463* 493*   HCO3 27 24 26   O2PER 4L  4L 92.0 96.0      Complete Blood Count            Lab Results   Component Value Date     WBC 12.0 03/15/2021            Lab Results   Component Value Date     RBC 3.96 03/15/2021            Lab Results   Component Value Date     HGB 11.9 03/15/2021            Lab Results   Component Value Date     HCT 37.2 03/15/2021      No components found for: MCT        Lab Results   Component Value Date     MCV 94 03/15/2021            Lab Results   Component Value Date     MCH 30.1 03/15/2021            Lab Results   Component Value Date     MCHC 32.0 03/15/2021            Lab Results   Component Value Date     RDW 12.9 03/15/2021            Lab Results   Component Value Date      03/15/2021         5. RADIOLOGY:   Exam: XR CHEST PORT 1 VW, 3/15/2021 2:14 PM    Comparison: Chest x-ray 3/2/2021   History: Post  Op CVTS Surgery   Findings:  A single portable AP semiupright view of the chest is performed.  Postsurgical changes of complete pericardectomy with numerous  mediastinal surgical clips. Median sternotomy wires are intact. Right  internal jugular Faywood-Ezra catheter is in the right main pulmonary  artery. Bilateral chest tubes and mediastinal drains are in place.     Trachea is midline. Mediastinum is within normal limits.  Cardiopulmonary silhouette is within normal limits. Streaky  retrocardiac opacities. Possible trace pleural effusions. There is no  pneumothorax or pleural effusion. The upper abdomen is unremarkable.                                                                   Impression:   1. Faywood-Ezra catheter tip is in the central right main pulmonary  artery.  2. Bibasilar atelectasis. Possible trace pleural effusions.  3. No appreciable pneumothorax.     I have personally reviewed the examination and initial interpretation  and I agree with the findings.     ROYAL ELLSWORTH, DO     =========================================

## 2021-05-12 ENCOUNTER — MYC MEDICAL ADVICE (OUTPATIENT)
Dept: CARDIOLOGY | Facility: CLINIC | Age: 36
End: 2021-05-12

## 2021-05-12 LAB
MYCOBACTERIUM SPEC CULT: NORMAL
MYCOBACTERIUM SPEC CULT: NORMAL
SPECIMEN SOURCE: NORMAL

## 2021-05-13 ENCOUNTER — TELEPHONE (OUTPATIENT)
Dept: CARDIOLOGY | Facility: CLINIC | Age: 36
End: 2021-05-13

## 2021-05-13 NOTE — TELEPHONE ENCOUNTER
"Pt needs to reschedule 6/18 follow up with julissa with labs prior.    FOLLOW UP REQUEST HAS BEEN CANCELLED; can be found in the \"finalized requests\" tab of the pt's appointment desk. PLEASE REINSTATE REQUEST (right click on request and select \"reinstate\") AND LINK TO APPOINTMENT WHEN SCHEDULING.   "

## 2021-05-13 NOTE — TELEPHONE ENCOUNTER
Date: 5/13/2021    Time of Call: 7:14 AM     Diagnosis:  HF     [ VORB ] Ordering provider: Dr. Parikh  Order: Increase Torsemide to 60 mg daily     Order received by: Silvia Ruiz RN      Follow-up/additional notes: mychart sent to patient.

## 2021-05-26 DIAGNOSIS — I50.32 CHRONIC DIASTOLIC CONGESTIVE HEART FAILURE (H): Primary | ICD-10-CM

## 2021-05-26 PROBLEM — R87.610 ATYPICAL SQUAMOUS CELLS OF UNDETERMINED SIGNIFICANCE ON CYTOLOGIC SMEAR OF CERVIX (ASC-US): Status: ACTIVE | Noted: 2019-02-20

## 2021-06-04 ENCOUNTER — ANCILLARY PROCEDURE (OUTPATIENT)
Dept: CARDIOLOGY | Facility: CLINIC | Age: 36
End: 2021-06-04
Payer: COMMERCIAL

## 2021-06-04 ENCOUNTER — OFFICE VISIT (OUTPATIENT)
Dept: CARDIOLOGY | Facility: CLINIC | Age: 36
End: 2021-06-04
Attending: NURSE PRACTITIONER
Payer: COMMERCIAL

## 2021-06-04 VITALS
BODY MASS INDEX: 23.66 KG/M2 | OXYGEN SATURATION: 100 % | HEIGHT: 65 IN | HEART RATE: 84 BPM | SYSTOLIC BLOOD PRESSURE: 101 MMHG | WEIGHT: 142 LBS | DIASTOLIC BLOOD PRESSURE: 67 MMHG

## 2021-06-04 DIAGNOSIS — M32.9 SYSTEMIC LUPUS ERYTHEMATOSUS, UNSPECIFIED SLE TYPE, UNSPECIFIED ORGAN INVOLVEMENT STATUS (H): ICD-10-CM

## 2021-06-04 DIAGNOSIS — I50.32 CHRONIC DIASTOLIC CONGESTIVE HEART FAILURE (H): ICD-10-CM

## 2021-06-04 DIAGNOSIS — I50.32 CHRONIC DIASTOLIC CONGESTIVE HEART FAILURE (H): Primary | ICD-10-CM

## 2021-06-04 DIAGNOSIS — I31.8 OTHER PERICARDITIS, UNSPECIFIED CHRONICITY: ICD-10-CM

## 2021-06-04 DIAGNOSIS — I07.1 TRICUSPID VALVE INSUFFICIENCY, UNSPECIFIED ETIOLOGY: ICD-10-CM

## 2021-06-04 LAB
ANION GAP SERPL CALCULATED.3IONS-SCNC: 6 MMOL/L (ref 3–14)
BUN SERPL-MCNC: 13 MG/DL (ref 7–30)
CALCIUM SERPL-MCNC: 9 MG/DL (ref 8.5–10.1)
CHLORIDE SERPL-SCNC: 102 MMOL/L (ref 94–109)
CO2 SERPL-SCNC: 32 MMOL/L (ref 20–32)
CREAT SERPL-MCNC: 0.71 MG/DL (ref 0.52–1.04)
GFR SERPL CREATININE-BSD FRML MDRD: >90 ML/MIN/{1.73_M2}
GLUCOSE SERPL-MCNC: 92 MG/DL (ref 70–99)
NT-PROBNP SERPL-MCNC: 262 PG/ML (ref 0–125)
POTASSIUM SERPL-SCNC: 4.2 MMOL/L (ref 3.4–5.3)
SODIUM SERPL-SCNC: 139 MMOL/L (ref 133–144)

## 2021-06-04 PROCEDURE — 99213 OFFICE O/P EST LOW 20 MIN: CPT | Mod: 25 | Performed by: NURSE PRACTITIONER

## 2021-06-04 PROCEDURE — G0463 HOSPITAL OUTPT CLINIC VISIT: HCPCS

## 2021-06-04 PROCEDURE — 93306 TTE W/DOPPLER COMPLETE: CPT | Performed by: INTERNAL MEDICINE

## 2021-06-04 PROCEDURE — 80048 BASIC METABOLIC PNL TOTAL CA: CPT | Performed by: PATHOLOGY

## 2021-06-04 PROCEDURE — 83880 ASSAY OF NATRIURETIC PEPTIDE: CPT | Performed by: PATHOLOGY

## 2021-06-04 PROCEDURE — 36415 COLL VENOUS BLD VENIPUNCTURE: CPT | Performed by: PATHOLOGY

## 2021-06-04 ASSESSMENT — PAIN SCALES - GENERAL: PAINLEVEL: NO PAIN (0)

## 2021-06-04 ASSESSMENT — MIFFLIN-ST. JEOR: SCORE: 1332.05

## 2021-06-04 NOTE — PROGRESS NOTES
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 Jang on 3/15/21    Patient had a visit with Dr. Parikh on 4/15. They tried to decrease her diuretic and she gained like 5 lbs back so they increased her to Torsemide 60 mg daily. She hasn't been weighing herself at home . She says she was around 136 lbs when she last checked. No SOB at rest. She doesn't have HOUSER at the pace she walks at. She does feel some HOUSER with the more walking. She has no LE and can't tell if she has abdominal bloating. She has been doing more activity ( exercising )  at home and tolerating it well.      Denies , PND, orthopnea, edema, weight gain, chest pain, palpitations, lightheadedness, dizziness, near syncopal/syncopal episodes. Sissy has been following salt and fluid restrictions.      PAST MEDICAL HISTORY:  Past Medical History:   Diagnosis Date     SLE (systemic lupus erythematosus related syndrome) (H)        FAMILY HISTORY:  Family History   Problem Relation Age of Onset     Breast Cancer Paternal Grandmother      Heart Disease Maternal Grandfather        SOCIAL HISTORY:  Social History     Tobacco Use     Smoking status: Never Smoker     Smokeless tobacco: Never Used   Substance Use Topics     Alcohol use: Yes     Alcohol/week: 0.0 standard drinks     Comment: rarely      Drug use: No           CURRENT MEDICATIONS:  Current Outpatient Medications   Medication Sig Dispense Refill     acetaminophen (TYLENOL) 325 MG tablet Take 1-2 tablets (325-650 mg) by mouth every 6 hours as needed for pain 50 tablet 0     cholecalciferol 50 MCG (2000 UT) tablet Take 2,000 Units by mouth every morning        hydroxychloroquine (PLAQUENIL) 200 MG tablet Take 200 mg by mouth every morning        metroNIDAZOLE (METROCREAM) 0.75 % external cream as needed        potassium chloride ER (KLOR-CON M) 20 MEQ CR  "tablet Take 1 tablet (20 mEq) by mouth daily 90 tablet 1     sertraline (ZOLOFT) 100 MG tablet Take 100 mg by mouth every morning        spironolactone (ALDACTONE) 25 MG tablet Take 1 tablet (25 mg) by mouth daily 90 tablet 3     sulfacetamide sodium-sulfur 10-5 % LOTN as needed        torsemide (DEMADEX) 20 MG tablet Take 2 tablets (40 mg) by mouth daily 180 tablet 3     pantoprazole (PROTONIX) 40 MG EC tablet Take 1 tablet (40 mg) by mouth every morning (before breakfast) (Patient not taking: Reported on 6/4/2021) 30 tablet 1     senna-docusate (SENOKOT-S/PERICOLACE) 8.6-50 MG tablet Take 1 tablet by mouth 2 times daily as needed for constipation (Patient not taking: Reported on 6/4/2021) 30 tablet 0       ROS:  Review Of Systems  Skin: negative  Eyes: negative  Ears/Nose/Throat: negative  Respiratory: No shortness of breath, dyspnea on exertion, cough, or hemoptysis and Dyspnea on exertion- with jose than daily activity  Cardiovascular: negative  Gastrointestinal: negative  Genitourinary: negative  Musculoskeletal: negative  Neurologic: negative  Psychiatric: negative  Hematologic/Lymphatic/Immunologic: negative  Endocrine: negative      EXAM:  /67 (BP Location: Right arm, Patient Position: Chair, Cuff Size: Adult Regular)   Pulse 84   Ht 1.638 m (5' 4.5\")   Wt 64.4 kg (142 lb)   SpO2 100%   BMI 24.00 kg/m    Home weight:  General: alert, articulate, and in no acute distress.  HEENT: normocephalic, atraumatic, anicteric sclera, EOMI, mucosa moist, no cyanosis.   Neck: neck supple.  No adenopathy, masses, or carotid bruits.  JVP < 8 at 45 degrees  Heart: regular rhythm, normal S1/S2, no murmur, gallop, rub.  Precordium quiet with normal PMI.     Lungs: clear, no rales, ronchi, or wheezing.  No accessory muscle use, respirations unlabored.   Abdomen: soft, non-tender, bowel sounds present, no hepatomegaly  Extremities: no pitting edema.   No cyanosis.     Neurological: alert and oriented x 3.  normal " speech and affect, no gross motor deficits  Skin:  No ecchymoses, rashes, or clubbing.    Labs:  CBC RESULTS:  Lab Results   Component Value Date    WBC 7.6 03/23/2021    RBC 4.69 03/23/2021    HGB 13.7 03/23/2021    HCT 42.2 03/23/2021    MCV 90 03/23/2021    MCH 29.2 03/23/2021    MCHC 32.5 03/23/2021    RDW 12.8 03/23/2021     03/23/2021       CMP RESULTS:  Lab Results   Component Value Date     04/15/2021    POTASSIUM 4.1 04/15/2021    CHLORIDE 99 04/15/2021    CO2 34 (H) 04/15/2021    ANIONGAP 5 04/15/2021    GLC 88 04/15/2021    BUN 14 04/15/2021    CR 0.74 04/15/2021    GFRESTIMATED >90 04/15/2021    GFRESTBLACK >90 04/15/2021    BARTOLO 9.2 04/15/2021    BILITOTAL 0.9 03/16/2021    ALBUMIN 3.4 03/16/2021    ALKPHOS 59 03/16/2021    ALT 16 03/16/2021    AST 26 03/16/2021        INR RESULTS:  Lab Results   Component Value Date    INR 1.49 (H) 03/15/2021       No components found for: CK  Lab Results   Component Value Date    MAG 2.2 03/23/2021     Lab Results   Component Value Date    NTBNP 245 (H) 04/15/2021     @BRIEFLABR (dig)@    Most recent echocardiogram:  No results found for this or any previous visit (from the past 8760 hour(s)).      Assessment and Plan:    In summary,Sissy  is a 35 year who is here for an initial app[place] [facility] [place] She appears to be euvolemic today on exam. No med changes today. Awaiting echo results.  #Recurrent pericarditis c/b constriction s/p pericardiectomy  #SLE with pericardial involvement  #Mild to moderate TR  - continue aldactone 25mg daily  - torsemide 60 mg daily with 20mEq K    2.  Other comordbid conditions:   # SLE - Rheumatology following remains on-  plaquinil .       3.  Follow-up   Echo results pending  Dr. Parikh - August   [facility] - September     I reviewed patients pertinent clinical laboratory studies  I reviewed patients medications  I reviewed patients pertinent medical records        Thad ANDRES CNP  [facility]

## 2021-06-04 NOTE — NURSING NOTE
Chief Complaint   Patient presents with     New Patient     New CORE, 34 yo female      Vitals were taken and medications where reconciled.   Josue Roche, EMT  9:02 AM

## 2021-06-04 NOTE — PATIENT INSTRUCTIONS
Take your medicines every day, as directed    Changes made today:  o No med changes     Monitor Your Weight and Symptoms    Contact us if you:      Gain 2 pounds in one day or 5 pounds in one week    Feel more short of breath    Notice more leg swelling    Feel lightheadeded   Change your lifestyle    Limit Salt or Sodium:    2000 mg  Limit Fluids:    2000 mL or approximately 64 ounces  Eat a Heart Healthy Diet    Low in saturated fats  Stay Active:    Aim to move at least 150 minutes every  week         To Contact us    During Business Hours:  [phone], option # 1 (University)  Then option # 4 (medical questions)     After hours, weekends or holidays:   [phone], Option #4  Ask to speak to the On-Call Cardiologist. Inform them you are a CORE/heart failure patient at the [facility].     Use [employer] allows you to communicate directly with your heart team through secure messaging.    [employer] can be accessed any time on your phone, computer, or tablet.    If you need assistance, we'd be happy to help!         Keep your Heart Appointments:    Follow-up with Dr. Parikh in August.   Follow-up C[facility] in September.

## 2021-06-04 NOTE — LETTER
6/4/2021      RE: Sissy Donaldson  [address]  [place] [address]       Dear Colleague,    Thank you for the opportunity to participate in the care of your patient, Sissy Donaldson, at the [facility] [place] [facility]. Please see a copy of my visit note below.      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 Jang on 3/15/21    Patient had a visit with Dr. Parikh on 4/15. They tried to decrease her diuretic and she gained like 5 lbs back so they increased her to Torsemide 60 mg daily. She hasn't been weighing herself at home . She says she was around 136 lbs when she last checked. No SOB at rest. She doesn't have HOUSER at the pace she walks at. She does feel some HOUSER with the more walking. She has no LE and can't tell if she has abdominal bloating. She has been doing more activity ( exercising )  at home and tolerating it well.      Denies , PND, orthopnea, edema, weight gain, chest pain, palpitations, lightheadedness, dizziness, near syncopal/syncopal episodes. Sissy has been following salt and fluid restrictions.      PAST MEDICAL HISTORY:  Past Medical History:   Diagnosis Date     SLE (systemic lupus erythematosus related syndrome) (H)        FAMILY HISTORY:  Family History   Problem Relation Age of Onset     Breast Cancer Paternal Grandmother      Heart Disease Maternal Grandfather        SOCIAL HISTORY:  Social History     Tobacco Use     Smoking status: Never Smoker     Smokeless tobacco: Never Used   Substance Use Topics     Alcohol use: Yes     Alcohol/week: 0.0 standard drinks     Comment: rarely      Drug use: No           CURRENT MEDICATIONS:  Current Outpatient Medications   Medication Sig Dispense Refill     acetaminophen (TYLENOL) 325 MG tablet Take 1-2 tablets  "(325-650 mg) by mouth every 6 hours as needed for pain 50 tablet 0     cholecalciferol 50 MCG (2000 UT) tablet Take 2,000 Units by mouth every morning        hydroxychloroquine (PLAQUENIL) 200 MG tablet Take 200 mg by mouth every morning        metroNIDAZOLE (METROCREAM) 0.75 % external cream as needed        potassium chloride ER (KLOR-CON M) 20 MEQ CR tablet Take 1 tablet (20 mEq) by mouth daily 90 tablet 1     sertraline (ZOLOFT) 100 MG tablet Take 100 mg by mouth every morning        spironolactone (ALDACTONE) 25 MG tablet Take 1 tablet (25 mg) by mouth daily 90 tablet 3     sulfacetamide sodium-sulfur 10-5 % LOTN as needed        torsemide (DEMADEX) 20 MG tablet Take 2 tablets (40 mg) by mouth daily 180 tablet 3     pantoprazole (PROTONIX) 40 MG EC tablet Take 1 tablet (40 mg) by mouth every morning (before breakfast) (Patient not taking: Reported on 6/4/2021) 30 tablet 1     senna-docusate (SENOKOT-S/PERICOLACE) 8.6-50 MG tablet Take 1 tablet by mouth 2 times daily as needed for constipation (Patient not taking: Reported on 6/4/2021) 30 tablet 0       ROS:  Review Of Systems  Skin: negative  Eyes: negative  Ears/Nose/Throat: negative  Respiratory: No shortness of breath, dyspnea on exertion, cough, or hemoptysis and Dyspnea on exertion- with jose than daily activity  Cardiovascular: negative  Gastrointestinal: negative  Genitourinary: negative  Musculoskeletal: negative  Neurologic: negative  Psychiatric: negative  Hematologic/Lymphatic/Immunologic: negative  Endocrine: negative      EXAM:  /67 (BP Location: Right arm, Patient Position: Chair, Cuff Size: Adult Regular)   Pulse 84   Ht 1.638 m (5' 4.5\")   Wt 64.4 kg (142 lb)   SpO2 100%   BMI 24.00 kg/m    Home weight:  General: alert, articulate, and in no acute distress.  HEENT: normocephalic, atraumatic, anicteric sclera, EOMI, mucosa moist, no cyanosis.   Neck: neck supple.  No adenopathy, masses, or carotid bruits.  JVP < 8 at 45 degrees  Heart: " regular rhythm, normal S1/S2, no murmur, gallop, rub.  Precordium quiet with normal PMI.     Lungs: clear, no rales, ronchi, or wheezing.  No accessory muscle use, respirations unlabored.   Abdomen: soft, non-tender, bowel sounds present, no hepatomegaly  Extremities: no pitting edema.   No cyanosis.     Neurological: alert and oriented x 3.  normal speech and affect, no gross motor deficits  Skin:  No ecchymoses, rashes, or clubbing.    Labs:  CBC RESULTS:  Lab Results   Component Value Date    WBC 7.6 03/23/2021    RBC 4.69 03/23/2021    HGB 13.7 03/23/2021    HCT 42.2 03/23/2021    MCV 90 03/23/2021    MCH 29.2 03/23/2021    MCHC 32.5 03/23/2021    RDW 12.8 03/23/2021     03/23/2021       CMP RESULTS:  Lab Results   Component Value Date     04/15/2021    POTASSIUM 4.1 04/15/2021    CHLORIDE 99 04/15/2021    CO2 34 (H) 04/15/2021    ANIONGAP 5 04/15/2021    GLC 88 04/15/2021    BUN 14 04/15/2021    CR 0.74 04/15/2021    GFRESTIMATED >90 04/15/2021    GFRESTBLACK >90 04/15/2021    BARTOLO 9.2 04/15/2021    BILITOTAL 0.9 03/16/2021    ALBUMIN 3.4 03/16/2021    ALKPHOS 59 03/16/2021    ALT 16 03/16/2021    AST 26 03/16/2021        INR RESULTS:  Lab Results   Component Value Date    INR 1.49 (H) 03/15/2021       No components found for: CK  Lab Results   Component Value Date    MAG 2.2 03/23/2021     Lab Results   Component Value Date    NTBNP 245 (H) 04/15/2021     @BRIEFLABR (dig)@    Most recent echocardiogram:  No results found for this or any previous visit (from the past 8760 hour(s)).      Assessment and Plan:    In summary,Sissy  is a 35 year who is here for an initial app[place] in [facility] [place]. She appears to be euvolemic today on exam. No med changes today. Awaiting echo results.  #Recurrent pericarditis c/b constriction s/p pericardiectomy  #SLE with pericardial involvement  #Mild to moderate TR  - continue aldactone 25mg daily  - torsemide 60 mg daily with 20mEq K    2.  Other comordbid conditions:   # SLE -  Rheumatology following remains on-  plaquinil .       3.  Follow-up   Echo results pending  Dr. Parikh - August   [facility] - September     I reviewed patients pertinent clinical laboratory studies  I reviewed patients medications  I reviewed patients pertinent medical records        Thad ANDRES CNP  [facility]

## 2021-08-12 ENCOUNTER — OFFICE VISIT (OUTPATIENT)
Dept: CARDIOLOGY | Facility: CLINIC | Age: 36
End: 2021-08-12
Attending: INTERNAL MEDICINE
Payer: COMMERCIAL

## 2021-08-12 ENCOUNTER — LAB (OUTPATIENT)
Dept: LAB | Facility: CLINIC | Age: 36
End: 2021-08-12
Payer: COMMERCIAL

## 2021-08-12 VITALS
DIASTOLIC BLOOD PRESSURE: 73 MMHG | BODY MASS INDEX: 23.99 KG/M2 | HEIGHT: 65 IN | WEIGHT: 144 LBS | HEART RATE: 85 BPM | OXYGEN SATURATION: 100 % | SYSTOLIC BLOOD PRESSURE: 105 MMHG

## 2021-08-12 DIAGNOSIS — I50.32 CHRONIC DIASTOLIC CONGESTIVE HEART FAILURE (H): ICD-10-CM

## 2021-08-12 LAB
ALBUMIN SERPL-MCNC: 4.1 G/DL (ref 3.4–5)
ALP SERPL-CCNC: 84 U/L (ref 40–150)
ALT SERPL W P-5'-P-CCNC: 26 U/L (ref 0–50)
ANION GAP SERPL CALCULATED.3IONS-SCNC: 6 MMOL/L (ref 3–14)
AST SERPL W P-5'-P-CCNC: 23 U/L (ref 0–45)
BILIRUB SERPL-MCNC: 0.5 MG/DL (ref 0.2–1.3)
BUN SERPL-MCNC: 12 MG/DL (ref 7–30)
CALCIUM SERPL-MCNC: 9.3 MG/DL (ref 8.5–10.1)
CHLORIDE BLD-SCNC: 104 MMOL/L (ref 94–109)
CO2 SERPL-SCNC: 29 MMOL/L (ref 20–32)
CREAT SERPL-MCNC: 0.8 MG/DL (ref 0.52–1.04)
GFR SERPL CREATININE-BSD FRML MDRD: >90 ML/MIN/1.73M2
GLUCOSE BLD-MCNC: 67 MG/DL (ref 70–99)
NT-PROBNP SERPL-MCNC: 180 PG/ML (ref 0–125)
POTASSIUM BLD-SCNC: 3.7 MMOL/L (ref 3.4–5.3)
PROT SERPL-MCNC: 7.2 G/DL (ref 6.8–8.8)
SODIUM SERPL-SCNC: 139 MMOL/L (ref 133–144)

## 2021-08-12 PROCEDURE — 80053 COMPREHEN METABOLIC PANEL: CPT | Performed by: PATHOLOGY

## 2021-08-12 PROCEDURE — G0463 HOSPITAL OUTPT CLINIC VISIT: HCPCS

## 2021-08-12 PROCEDURE — 99214 OFFICE O/P EST MOD 30 MIN: CPT | Mod: GC | Performed by: INTERNAL MEDICINE

## 2021-08-12 PROCEDURE — 36415 COLL VENOUS BLD VENIPUNCTURE: CPT | Performed by: PATHOLOGY

## 2021-08-12 PROCEDURE — 83880 ASSAY OF NATRIURETIC PEPTIDE: CPT | Performed by: PATHOLOGY

## 2021-08-12 ASSESSMENT — MIFFLIN-ST. JEOR: SCORE: 1342.18

## 2021-08-12 ASSESSMENT — PAIN SCALES - GENERAL: PAINLEVEL: NO PAIN (0)

## 2021-08-12 NOTE — PATIENT INSTRUCTIONS
"Cardiology Providers you saw during your visit:  Dr. Parikh    Medication changes:    You can start to taper diuretics.   Try going to 40 of torsemide daily   If doing well can go to 20 mg daily.    Follow up:  1 year with echo, sooner if any fluid or shortness of breath problems     Labs:  Results for JOVAN GARNETT (MRN [id number]) as of 8/12/2021 09:44   Ref. Range 6/4/2021 08:45   Sodium Latest Ref Range: 133 - 144 mmol/L 139   Potassium Latest Ref Range: 3.4 - 5.3 mmol/L 4.2   Chloride Latest Ref Range: 94 - 109 mmol/L 102   Carbon Dioxide Latest Ref Range: 20 - 32 mmol/L 32   Urea Nitrogen Latest Ref Range: 7 - 30 mg/dL 13   Creatinine Latest Ref Range: 0.52 - 1.04 mg/dL 0.71   GFR Estimate Latest Ref Range: >60 mL/min/1.73_m2 >90   GFR Estimate If Black Latest Ref Range: >60 mL/min/1.73_m2 >90   Calcium Latest Ref Range: 8.5 - 10.1 mg/dL 9.0   Anion Gap Latest Ref Range: 3 - 14 mmol/L 6   N-Terminal Pro Bnp Latest Ref Range: 0 - 125 pg/mL 262 (H)   Glucose Latest Ref Range: 70 - 99 mg/dL 92       Please call if you have :  1. Weight gain of more than 2 pounds in a day or 5 pounds in a week  2. Increased shortness of breath, swelling or bloating  3. Dizziness, lightheadedness   4. Any questions or concerns.       Follow the American Heart Association Diet and Lifestyle recommendations:  Limit saturated fat, trans fat, sodium, red meat, sweets and sugar-sweetened beverages. If you choose to eat red meat, compare labels and select the leanest cuts available.  Aim for at least 150 minutes of moderate physical activity or 75 minutes of vigorous physical activity - or an equal combination of both - each week.      During business hours: [phone], press option # 1 to be routed to the [employer] then option # 4 for \"To send a message to your care team\"      After hours, weekends or holidays: On Call Cardiologist- [phone]   option #4 and ask to speak to the on-call Cardiologist. Inform them you are a " "[facility].      Heart Failure Support Group  [facility]  The Board Room, 8th Floor  [address] [address]     Meetings   1-2 pm  , 1-2 p.m.  , 1-2 p.m.  , 1-2 p.m.  , 1-2 p.m.  May 3rd, 1-2 p.m.  , 1-2 p.m.  , 1-2 p.m.  , 1-2 p.m.  , 1-2 p.m.  , 1-2 p.m.  , 1-2 p.m.  , 1-2 p.m.      Alexus Vivar RN BSN CHFN  Cardiology Care Coordinator - Heart Failure/ [facility]  [facility]   Questions and [phone]   First press #1 for the [facility] and then press #4 for \"To send a message to your care team\"    "

## 2021-08-12 NOTE — NURSING NOTE
Chief Complaint   Patient presents with     Follow Up     RHF 36 yo female labs prior      Vitals were taken and medications reconciled.    Alfred Campbell, EMT  9:27 AM

## 2021-08-12 NOTE — LETTER
2021      RE: Sissy Andersonczmarek  [address]  [facility] [address]       Dear Colleague,    Thank you for the opportunity to participate in the care of your patient, Sissy Donaldson, at the [facility] [place] [facility]. Please see a copy of my visit note below.    2021     Jh Garcia MD   [facility]    [address] [id number]      Patria Iyer MD   [facility]    [address] [id number]      RE: Sissy Donaldson    MRN: [id number]   : 1985      Dear Colleagues:       I had the pleasure of seeing Sissy Donaldson in the [facility] in follow up.  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 Jang on 3/15/21. Here for follow up.     Her lupus history begins about 12 years ago when she had arthritis in several joints as well as ITP.  She was placed on several months of steroids and eventually was on hydroxychloroquine with good control.  Five years ago, she had her first pericardial effusion, which was treated with anti-inflammatories.  She has had a recurrence of pericardial effusion in the last 2 years and also had onset of dyspnea on exertion.  She had abdominal bloating and lower extremity edema as well as generalized fatigue.  She has had significant evaluation for this, including right and left heart catheterization as well as a cardiac MRI.  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  This was removed over a year ago without improvement in her abdominal edema.  She also had several different paracenteses that had demonstrated transudative fluid.  Of note,  her filling pressures on her right heart catheterization were significantly elevated.  On her cardiac MRI, she had no fibrosis or evidence of infiltrative disease.      Given the concern for constriction she underwent complete pericardiectomy with Dr. Jang on 3/15/21. Post-operative TTE 3/18/2021 revealed severe TR w/ moderate RV dilation and mild RVSD. She progressed well with diuresis and her TR reduced to mild to moderate TR per serial echocardiograms.     Since her last visit 4/15/21 se has been doing well. She continues to be off prednisone and on hydroxychloroquine. She significant exertional symptoms. She is able to do most everything she wants, although she does report not exerting herself significantly. She has been taking 60mg torsemide daily. Most recently for the past month has been taking 50mg daily. Her weights have been stable. Previously when she had gained water weight she mainly gained it in her abdomen without significant lower extremity edema. Denies orthopnea, dizziness, syncope or presyncope. Denies chest pain, shortness of breath, HOUSER.     She was just recently seen by her rheumatologist. She denies any lupus symptoms and she reports reassuring serology studies.      PAST MEDICAL HISTORY:   1.  Lupus as detailed above.   2.  Pericardial effusions with recurrence and evidence of constrictive pericarditis s/p pericardiectomy 3/15/21.   3.  History of anxiety.   4.  GERD.      FAMILY HISTORY:  Notable for several family members with autoimmune disease on mother's side.  There is a cousin with rheumatoid arthritis, an aunt with psoriatic arthritis and a great aunt with lupus, again all on the mother's side.  Her siblings do not have any autoimmune disease.  No one else has a history of cardiac disease in the family.      SOCIAL HISTORY:  The patient is  and has a son who is around 12 that she shares half custody with.  She does not smoke, but does occasionally drink social alcohol.     "  Current Outpatient Medications   Medication Sig Dispense Refill     acetaminophen (TYLENOL) 325 MG tablet Take 1-2 tablets (325-650 mg) by mouth every 6 hours as needed for pain 50 tablet 0     cholecalciferol 50 MCG (2000 UT) tablet Take 2,000 Units by mouth every morning        hydroxychloroquine (PLAQUENIL) 200 MG tablet Take 200 mg by mouth every morning        metroNIDAZOLE (METROCREAM) 0.75 % external cream as needed        potassium chloride ER (KLOR-CON M) 20 MEQ CR tablet Take 1 tablet (20 mEq) by mouth daily 90 tablet 1     spironolactone (ALDACTONE) 25 MG tablet Take 1 tablet (25 mg) by mouth daily 90 tablet 3     sulfacetamide sodium-sulfur 10-5 % LOTN as needed        torsemide (DEMADEX) 20 MG tablet Take 2 tablets (40 mg) by mouth daily 180 tablet 3     pantoprazole (PROTONIX) 40 MG EC tablet Take 1 tablet (40 mg) by mouth every morning (before breakfast) (Patient not taking: Reported on 6/4/2021) 30 tablet 1     senna-docusate (SENOKOT-S/PERICOLACE) 8.6-50 MG tablet Take 1 tablet by mouth 2 times daily as needed for constipation (Patient not taking: Reported on 6/4/2021) 30 tablet 0     sertraline (ZOLOFT) 100 MG tablet Take 100 mg by mouth every morning                REVIEW OF SYSTEMS:     CONSTITUTIONAL:  No fevers, chills or sweats.  Weight fluctuates with weight gain, but she has not had unintentional weight loss.   ENT:  No visual disturbance, epistaxis or sore throat.   ALLERGIES:  Negative.   RESPIRATORY:  No cough or hemoptysis.   CARDIOVASCULAR:  As per HPI.   GASTROINTESTINAL:  No nausea, vomiting, hematemesis, melena or hematochezia.   GENITOURINARY:  No urinary frequency, dysuria or hematuria.   PSYCHIATRIC:  Negative  NEUROLOGIC:  Negative.   ENDOCRINE:  Negative.   MUSCULOSKELETAL:  Negative.  No recent arthritis flares.      PHYSICAL EXAMINATION:   /73 (BP Location: Right arm, Patient Position: Chair, Cuff Size: Adult Regular)   Pulse 85   Ht 1.64 m (5' 4.57\")   Wt 65.3 kg " (144 lb)   SpO2 100%   BMI 24.29 kg/m    GENERAL:  The patient appears well and appears her stated age.  Breathing is comfortable at rest.   HEENT:  Moist mucous membranes.  No scleral icterus.   NECK:  No thyromegaly or masses.     CARDIAC:  RRR, no murmur at LLSB. +S1/S2. JVP 4 cm.   LUNGS:  Clear to auscultation bilaterally.   ABDOMEN:  Soft, nontender, non distended.  EXTREMITIES:  No lower extremity edema.  Normal cap refill.   NEUROLOGIC:  Normal speech and affect.  No gross deficits.  Normal gait.      TTE 3/22/2021  Global and regional left ventricular function is normal with an EF of 55-60%.  Global right ventricular function is mildly reduced.  Mild tricuspid insufficiency is present.  Dilation of the inferior vena cava is present with abnormal respiratory  variation in diameter.  Post pericardiectomy. No pericardial effusion is present.    TTE 3/30/2021  Global and regional left ventricular function is normal with an EF of 60-65%.  Global right ventricular function is normal.  Moderate tricuspid insufficiency is present.  Pulmonary artery systolic pressure is normal.  The inferior vena cava was normal in size with preserved respiratory  variability.  No pericardial effusion is present.    TTE 2/10/2021  There is pericardial tethering, septal bounce, and dilated IVC. While these  findings are supportive of pericardial constriction, other typical findings of  constriction (respirophasic septal shift, AV valve inflow velocities and  hepatic vein velocities suggestive of enhanced ventricular interaction, and  annulus reversus Tissue Doppler pattern) are not present.  Tissue Dopple e' velocities are normal, suggesting against restrictive  cardiomyopathy.     Left ventricular function, chamber size, wall motion, and wall thickness are  normal.The EF is 55-60%.  Right ventricular function, chamber size, wall motion, and thickness are  normal.  No significant valvular abnormalities were noted.  Previous study  not available for comparison.  echocardiogram from 09/14/2020:  Normal LV size, normal LV function, ejection fraction 60%, normal RV size and function.  Cardiac valves are normal.  IVC is dilated.  No pericardial effusion.  Atrial sizes are also normal.  LV end-diastolic dimension of 4.3 cm.      Right heart catheterization from 2019 shows a right atrial pressure of 24, RV 35/22, wedge pressure of 22, pulmonary artery pressure 35/27, mean of 27, Madelin cardiac index 2.4.  Coronary angiogram was not performed.      Cardiac MRI demonstrated normal LV size, ejection fraction 50%.  Normal RV size and function.  Pericardial thickness appears to be normal.  Trivial pericardial effusion.  No intraventricular interdependence.  With gadolinium, there was hyperenhancement of the pericardium suggestive of acute inflammatory pericarditis.  There was no evidence of infiltrative cardiomyopathy.  HIV is negative.  Rheumatoid factor is negative.  Double-stranded DNA is equivocal.  NT-proBNP is 145 and has been very low as well in the past.      ASSESSMENT AND RECOMMENDATIONS:  In summary, this is a very pleasant, 35-year-old woman with a long-standing history of lupus, and constrictive pericarditis complete pericardiectomy with Dr. Jang on 3/15/21. Here for follow up. She is doing well without residual symptoms and euvolemic on exam.     #Recurrent pericarditis c/b constriction s/p pericardiectomy  #SLE with pericardial involvement  #Mild to moderate TR  - wean torsemide 60mg daily to 20mg daily with 20mEq K  - continue spironolactone 25mg daily    RTC: 2 months with [facility] to potentially discontinue diuretics at that time. 1 year with Dr. Parikh.     Kayode Harvey MD   Cardiology Fellow         Please do not hesitate to contact me if you have any questions/concerns.     Sincerely,     Xuan Parikh MD

## 2021-08-12 NOTE — PROGRESS NOTES
2021     Tawatchai Paisansinsup, MD [facility]    [address] [place] [address]      Elizabeth Bisinov, MD [facility]    [facility] [place] [address]      RE: Sissy Donaldson    MRN: [id number]   : 1985      Dear Colleagues:       I had the pleasure of seeing Sissy Donaldson in the [facility] in follow up.  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 Jang on 3/15/21. Here for follow up.     Her lupus history begins about 12 years ago when she had arthritis in several joints as well as ITP.  She was placed on several months of steroids and eventually was on hydroxychloroquine with good control.  Five years ago, she had her first pericardial effusion, which was treated with anti-inflammatories.  She has had a recurrence of pericardial effusion in the last 2 years and also had onset of dyspnea on exertion.  She had abdominal bloating and lower extremity edema as well as generalized fatigue.  She has had significant evaluation for this, including right and left heart catheterization as well as a cardiac MRI.  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  This was removed over a year ago without improvement in her abdominal edema.  She also had several different paracenteses that had demonstrated transudative fluid.  Of note, her filling pressures on her right heart catheterization were significantly elevated.  On her cardiac MRI, she had no fibrosis or evidence of infiltrative disease.      Given the concern for constriction she underwent complete pericardiectomy with Dr. Jang on 3/15/21. Post-operative TTE 3/18/2021 revealed severe TR w/ moderate RV dilation and mild  RVSD. She progressed well with diuresis and her TR reduced to mild to moderate TR per serial echocardiograms.     Since her last visit 4/15/21 se has been doing well. She continues to be off prednisone and on hydroxychloroquine. She significant exertional symptoms. She is able to do most everything she wants, although she does report not exerting herself significantly. She has been taking 60mg torsemide daily. Most recently for the past month has been taking 50mg daily. Her weights have been stable. Previously when she had gained water weight she mainly gained it in her abdomen without significant lower extremity edema. Denies orthopnea, dizziness, syncope or presyncope. Denies chest pain, shortness of breath, HOUSER.     She was just recently seen by her rheumatologist. She denies any lupus symptoms and she reports reassuring serology studies.      PAST MEDICAL HISTORY:   1.  Lupus as detailed above.   2.  Pericardial effusions with recurrence and evidence of constrictive pericarditis s/p pericardiectomy 3/15/21.   3.  History of anxiety.   4.  GERD.      FAMILY HISTORY:  Notable for several family members with autoimmune disease on mother's side.  There is a cousin with rheumatoid arthritis, an aunt with psoriatic arthritis and a great aunt with lupus, again all on the mother's side.  Her siblings do not have any autoimmune disease.  No one else has a history of cardiac disease in the family.      SOCIAL HISTORY:  The patient is  and has a son who is around 12 that she shares half custody with.  She does not smoke, but does occasionally drink social alcohol.      Current Outpatient Medications   Medication Sig Dispense Refill     acetaminophen (TYLENOL) 325 MG tablet Take 1-2 tablets (325-650 mg) by mouth every 6 hours as needed for pain 50 tablet 0     cholecalciferol 50 MCG (2000 UT) tablet Take 2,000 Units by mouth every morning        hydroxychloroquine (PLAQUENIL) 200 MG tablet Take 200 mg by mouth every  "morning        metroNIDAZOLE (METROCREAM) 0.75 % external cream as needed        potassium chloride ER (KLOR-CON M) 20 MEQ CR tablet Take 1 tablet (20 mEq) by mouth daily 90 tablet 1     spironolactone (ALDACTONE) 25 MG tablet Take 1 tablet (25 mg) by mouth daily 90 tablet 3     sulfacetamide sodium-sulfur 10-5 % LOTN as needed        torsemide (DEMADEX) 20 MG tablet Take 2 tablets (40 mg) by mouth daily 180 tablet 3     pantoprazole (PROTONIX) 40 MG EC tablet Take 1 tablet (40 mg) by mouth every morning (before breakfast) (Patient not taking: Reported on 6/4/2021) 30 tablet 1     senna-docusate (SENOKOT-S/PERICOLACE) 8.6-50 MG tablet Take 1 tablet by mouth 2 times daily as needed for constipation (Patient not taking: Reported on 6/4/2021) 30 tablet 0     sertraline (ZOLOFT) 100 MG tablet Take 100 mg by mouth every morning                REVIEW OF SYSTEMS:     CONSTITUTIONAL:  No fevers, chills or sweats.  Weight fluctuates with weight gain, but she has not had unintentional weight loss.   ENT:  No visual disturbance, epistaxis or sore throat.   ALLERGIES:  Negative.   RESPIRATORY:  No cough or hemoptysis.   CARDIOVASCULAR:  As per HPI.   GASTROINTESTINAL:  No nausea, vomiting, hematemesis, melena or hematochezia.   GENITOURINARY:  No urinary frequency, dysuria or hematuria.   PSYCHIATRIC:  Negative  NEUROLOGIC:  Negative.   ENDOCRINE:  Negative.   MUSCULOSKELETAL:  Negative.  No recent arthritis flares.      PHYSICAL EXAMINATION:   /73 (BP Location: Right arm, Patient Position: Chair, Cuff Size: Adult Regular)   Pulse 85   Ht 1.64 m (5' 4.57\")   Wt 65.3 kg (144 lb)   SpO2 100%   BMI 24.29 kg/m    GENERAL:  The patient appears well and appears her stated age.  Breathing is comfortable at rest.   HEENT:  Moist mucous membranes.  No scleral icterus.   NECK:  No thyromegaly or masses.     CARDIAC:  RRR, no murmur at LLSB. +S1/S2. JVP 4 cm.   LUNGS:  Clear to auscultation bilaterally.   ABDOMEN:  Soft, " nontender, non distended.  EXTREMITIES:  No lower extremity edema.  Normal cap refill.   NEUROLOGIC:  Normal speech and affect.  No gross deficits.  Normal gait.      TTE 3/22/2021  Global and regional left ventricular function is normal with an EF of 55-60%.  Global right ventricular function is mildly reduced.  Mild tricuspid insufficiency is present.  Dilation of the inferior vena cava is present with abnormal respiratory  variation in diameter.  Post pericardiectomy. No pericardial effusion is present.    TTE 3/30/2021  Global and regional left ventricular function is normal with an EF of 60-65%.  Global right ventricular function is normal.  Moderate tricuspid insufficiency is present.  Pulmonary artery systolic pressure is normal.  The inferior vena cava was normal in size with preserved respiratory  variability.  No pericardial effusion is present.    TTE 2/10/2021  There is pericardial tethering, septal bounce, and dilated IVC. While these  findings are supportive of pericardial constriction, other typical findings of  constriction (respirophasic septal shift, AV valve inflow velocities and  hepatic vein velocities suggestive of enhanced ventricular interaction, and  annulus reversus Tissue Doppler pattern) are not present.  Tissue Dopple e' velocities are normal, suggesting against restrictive  cardiomyopathy.     Left ventricular function, chamber size, wall motion, and wall thickness are  normal.The EF is 55-60%.  Right ventricular function, chamber size, wall motion, and thickness are  normal.  No significant valvular abnormalities were noted.  Previous study not available for comparison.  echocardiogram from 09/14/2020:  Normal LV size, normal LV function, ejection fraction 60%, normal RV size and function.  Cardiac valves are normal.  IVC is dilated.  No pericardial effusion.  Atrial sizes are also normal.  LV end-diastolic dimension of 4.3 cm.      Right heart catheterization from 2019 shows a right  atrial pressure of 24, RV 35/22, wedge pressure of 22, pulmonary artery pressure 35/27, mean of 27, Madelin cardiac index 2.4.  Coronary angiogram was not performed.      Cardiac MRI demonstrated normal LV size, ejection fraction 50%.  Normal RV size and function.  Pericardial thickness appears to be normal.  Trivial pericardial effusion.  No intraventricular interdependence.  With gadolinium, there was hyperenhancement of the pericardium suggestive of acute inflammatory pericarditis.  There was no evidence of infiltrative cardiomyopathy.  HIV is negative.  Rheumatoid factor is negative.  Double-stranded DNA is equivocal.  NT-proBNP is 145 and has been very low as well in the past.      ASSESSMENT AND RECOMMENDATIONS:  In summary, this is a very pleasant, 35-year-old woman with a long-standing history of lupus, and constrictive pericarditis complete pericardiectomy with Dr. Jang on 3/15/21. Here for follow up. She is doing well without residual symptoms and euvolemic on exam.     #Recurrent pericarditis c/b constriction s/p pericardiectomy  #SLE with pericardial involvement  #Mild to moderate TR  - wean torsemide 60mg daily to 20mg daily with 20mEq K  - continue spironolactone 25mg daily    RTC: 2 months with [facility] to potentially discontinue diuretics at that time. 1 year with Dr. Parikh.     Kayode Harvey MD   Cardiology Fellow

## 2021-08-12 NOTE — PROGRESS NOTES
She has been taking 60mg torsemide daily. Most recently for the past month has been taking 50mg daily. Her weights have been stable. Previously when she had gained water weight she mainly gain it in her abdomen. Has not had significant lower extremity swelling. If she goes up four flights of stairs she will get short of breath. Although she doesn't exert herself significantly. Denies SOB, lightheadedness, dizziness.

## 2021-08-24 ENCOUNTER — MYC MEDICAL ADVICE (OUTPATIENT)
Dept: CARDIOLOGY | Facility: CLINIC | Age: 36
End: 2021-08-24

## 2021-09-03 DIAGNOSIS — I50.32 CHRONIC DIASTOLIC CONGESTIVE HEART FAILURE (H): Primary | ICD-10-CM

## 2021-09-10 NOTE — TELEPHONE ENCOUNTER
LVM for Ashli to follow up on diuretic response, will wait for her call back or response to Mychart message

## 2021-09-26 ENCOUNTER — HEALTH MAINTENANCE LETTER (OUTPATIENT)
Age: 36
End: 2021-09-26

## 2022-01-13 ENCOUNTER — TELEPHONE (OUTPATIENT)
Dept: CARDIOLOGY | Facility: CLINIC | Age: 37
End: 2022-01-13
Payer: COMMERCIAL

## 2022-01-13 NOTE — TELEPHONE ENCOUNTER
[facility] Call Center    Phone Message    May a detailed message be left on voicemail: yes     Reason for Call: Other: Pt would like a call back to discuss if she can get her echo sooner then later as she started working out and her face becomes numb and tingling and feels she should discuss with her cardio team. Please reach out to pt to discuss     Action Taken: Message routed to:  [facility] ([facility]): Cardio    Travel Screening: Not Applicable

## 2022-01-13 NOTE — TELEPHONE ENCOUNTER
Sissy has started working out and has occasionally noticed that she has numbness in her face and tingling in her lips and nose.  It resolves after about 5-10 minutes of rest.  She has not passed out or been dizzy during these episodes but has not checked her blood pressure (does not have a cuff).      She wonders if she can have her echo done early to check the status of her valve, she's been worried about it lately, but nothing specific at this time.  She is also hoping to get off of her diuretic at some point.  Her weight is stable, denies swelling or shortness of breath.      Reviewed with Dr Parikh- unclear why Sissy would be having those symptoms.  LVM for Sissy letting her know and providing number for Echo scheduling.

## 2022-03-13 ENCOUNTER — HEALTH MAINTENANCE LETTER (OUTPATIENT)
Age: 37
End: 2022-03-13

## 2022-03-14 ENCOUNTER — TELEPHONE (OUTPATIENT)
Dept: CARDIOLOGY | Facility: CLINIC | Age: 37
End: 2022-03-14
Payer: COMMERCIAL

## 2022-03-14 NOTE — TELEPHONE ENCOUNTER
[facility] Call Center    Phone Message    May a detailed message be left on voicemail: yes     Reason for Call: Pt has COVID and is wondering if she should continue diuretics as usual?    Please call patient to discuss.    Action Taken: Cardiology    Travel Screening: Not Applicable

## 2022-04-07 ENCOUNTER — ANCILLARY PROCEDURE (OUTPATIENT)
Dept: CARDIOLOGY | Facility: CLINIC | Age: 37
End: 2022-04-07
Attending: INTERNAL MEDICINE
Payer: COMMERCIAL

## 2022-04-07 DIAGNOSIS — I50.32 CHRONIC DIASTOLIC CONGESTIVE HEART FAILURE (H): ICD-10-CM

## 2022-04-07 LAB — LVEF ECHO: NORMAL

## 2022-04-07 PROCEDURE — 93306 TTE W/DOPPLER COMPLETE: CPT | Performed by: INTERNAL MEDICINE

## 2022-06-08 NOTE — Clinical Note
RCA Cine(s)  injected utilizing power injector system. See Pharmacy note regarding refill    Jona Bernard RN

## 2022-07-07 DIAGNOSIS — Z98.890 HISTORY OF PERICARDIECTOMY: ICD-10-CM

## 2022-07-13 RX ORDER — SPIRONOLACTONE 25 MG/1
25 TABLET ORAL DAILY
Qty: 90 TABLET | Refills: 3 | Status: SHIPPED | OUTPATIENT
Start: 2022-07-13

## 2022-08-25 DIAGNOSIS — Z98.890 HISTORY OF PERICARDIECTOMY: ICD-10-CM

## 2022-08-29 ENCOUNTER — TELEPHONE (OUTPATIENT)
Dept: CARDIOLOGY | Facility: CLINIC | Age: 37
End: 2022-08-29

## 2022-08-29 RX ORDER — TORSEMIDE 20 MG/1
40 TABLET ORAL DAILY
Qty: 180 TABLET | Refills: 1 | Status: SHIPPED | OUTPATIENT
Start: 2022-08-29 | End: 2023-01-05

## 2022-08-29 NOTE — TELEPHONE ENCOUNTER
[facility] Call Center    Phone Message    May a detailed message be left on voicemail: yes     Reason for Call: Medication Refill Request    Has the patient contacted the pharmacy for the refill? Yes   Name of medication being requested: torsemide (DEMADEX) 20 MG tablet  Provider who prescribed the medication: Xuan Parikh MD  Pharmacy: [id number] [id number] AT [facility]  [address]  Date medication is needed: 8/29/2022       Action Taken: Message routed to:  Other: cardio    Travel Screening: Not Applicable

## 2022-08-29 NOTE — TELEPHONE ENCOUNTER
TORSEMIDE 20MG TABLETS      Last Written Prescription Date:  4-30-21  Last Fill Quantity: 180,   # refills: 3  Last Office Visit : 8-12-21  Future Office visit:  none    11-15-21 outside lab:   Creatinine 0.55 - 1.02 mg/dL 0.81        Routing refill request to provider for review/approval because:  Gap in RF  Overdue appt/ labs  NaK    2nd request,8-29-22 , sent as high priority

## 2022-11-25 DIAGNOSIS — I50.32 CHRONIC DIASTOLIC CONGESTIVE HEART FAILURE (H): Primary | ICD-10-CM

## 2022-12-01 ENCOUNTER — LAB (OUTPATIENT)
Dept: LAB | Facility: CLINIC | Age: 37
End: 2022-12-01
Payer: COMMERCIAL

## 2022-12-01 ENCOUNTER — OFFICE VISIT (OUTPATIENT)
Dept: CARDIOLOGY | Facility: CLINIC | Age: 37
End: 2022-12-01
Attending: INTERNAL MEDICINE
Payer: COMMERCIAL

## 2022-12-01 VITALS
BODY MASS INDEX: 23.54 KG/M2 | OXYGEN SATURATION: 100 % | WEIGHT: 137.9 LBS | DIASTOLIC BLOOD PRESSURE: 75 MMHG | SYSTOLIC BLOOD PRESSURE: 117 MMHG | HEIGHT: 64 IN | HEART RATE: 79 BPM

## 2022-12-01 DIAGNOSIS — I50.32 CHRONIC DIASTOLIC CONGESTIVE HEART FAILURE (H): ICD-10-CM

## 2022-12-01 DIAGNOSIS — I50.32 CHRONIC DIASTOLIC CONGESTIVE HEART FAILURE (H): Primary | ICD-10-CM

## 2022-12-01 LAB
ANION GAP SERPL CALCULATED.3IONS-SCNC: 10 MMOL/L (ref 7–15)
BUN SERPL-MCNC: 13.3 MG/DL (ref 6–20)
CALCIUM SERPL-MCNC: 9.1 MG/DL (ref 8.6–10)
CHLORIDE SERPL-SCNC: 98 MMOL/L (ref 98–107)
CREAT SERPL-MCNC: 0.8 MG/DL (ref 0.51–0.95)
DEPRECATED HCO3 PLAS-SCNC: 30 MMOL/L (ref 22–29)
GFR SERPL CREATININE-BSD FRML MDRD: >90 ML/MIN/1.73M2
GLUCOSE SERPL-MCNC: 96 MG/DL (ref 70–99)
NT-PROBNP SERPL-MCNC: 136 PG/ML (ref 0–450)
POTASSIUM SERPL-SCNC: 4 MMOL/L (ref 3.4–5.3)
SODIUM SERPL-SCNC: 138 MMOL/L (ref 136–145)

## 2022-12-01 PROCEDURE — 99214 OFFICE O/P EST MOD 30 MIN: CPT | Mod: GC | Performed by: INTERNAL MEDICINE

## 2022-12-01 PROCEDURE — 80048 BASIC METABOLIC PNL TOTAL CA: CPT | Performed by: PATHOLOGY

## 2022-12-01 PROCEDURE — 36415 COLL VENOUS BLD VENIPUNCTURE: CPT | Performed by: PATHOLOGY

## 2022-12-01 PROCEDURE — 83880 ASSAY OF NATRIURETIC PEPTIDE: CPT | Performed by: PATHOLOGY

## 2022-12-01 PROCEDURE — G0463 HOSPITAL OUTPT CLINIC VISIT: HCPCS

## 2022-12-01 ASSESSMENT — PAIN SCALES - GENERAL: PAINLEVEL: NO PAIN (0)

## 2022-12-01 NOTE — PROGRESS NOTES
2022     Tawatchai Paisansinsup, MD [facility]    [address] [place] [address]      Elizabeth Bisinov, MD [facility]    [facility] [place] [address]      RE: Sissy Donaldson    MRN: [id number]   : 1985      Dear Colleagues:       I had the pleasure of seeing Sissy Donaldson in the [facility] in follow up.  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 Jang on 3/15/21. Here for follow up.     Her lupus history begins about 12 years ago when she had arthritis in several joints as well as ITP.  She was placed on several months of steroids and eventually was on hydroxychloroquine with good control.  Five years ago, she had her first pericardial effusion, which was treated with anti-inflammatories.  She has had a recurrence of pericardial effusion in the last 2 years and also had onset of dyspnea on exertion.  She had abdominal bloating and lower extremity edema as well as generalized fatigue.  She has had significant evaluation for this, including right and left heart catheterization as well as a cardiac MRI.  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  This was removed over a year ago without improvement in her abdominal edema.  She also had several different paracenteses that had demonstrated transudative fluid.  Of note, her filling pressures on her right heart catheterization were significantly elevated.  On her cardiac MRI, she had no fibrosis or evidence of infiltrative disease.      Given the concern for constriction she underwent complete pericardiectomy with Dr. Jang on 3/15/21. Post-operative TTE 3/18/2021 revealed severe TR w/ moderate RV dilation and mild  RVSD. She progressed well with diuresis and her TR reduced to mild to moderate TR per serial echocardiograms.     Since her last visit 8/21 she has been doing well. She takes torsemide 20mg BID and aldactone daily. She is overall doing well. Denies orthopnea, dizziness, syncope or presyncope. Denies chest pain, shortness of breath, HOUSER.        PAST MEDICAL HISTORY:   1.  Lupus as detailed above.   2.  Pericardial effusions with recurrence and evidence of constrictive pericarditis s/p pericardiectomy 3/15/21.   3.  History of anxiety.   4.  GERD.      FAMILY HISTORY:  Notable for several family members with autoimmune disease on mother's side.  There is a cousin with rheumatoid arthritis, an aunt with psoriatic arthritis and a great aunt with lupus, again all on the mother's side.  Her siblings do not have any autoimmune disease.  No one else has a history of cardiac disease in the family.      SOCIAL HISTORY:  The patient is  and has a son who is around 12 that she shares half custody with.  She does not smoke, but does occasionally drink social alcohol.      Current Outpatient Medications   Medication Sig Dispense Refill     acetaminophen (TYLENOL) 325 MG tablet Take 1-2 tablets (325-650 mg) by mouth every 6 hours as needed for pain 50 tablet 0     cholecalciferol 50 MCG (2000 UT) tablet Take 2,000 Units by mouth every morning        hydroxychloroquine (PLAQUENIL) 200 MG tablet Take 200 mg by mouth every morning        metroNIDAZOLE (METROCREAM) 0.75 % external cream as needed        pantoprazole (PROTONIX) 40 MG EC tablet Take 1 tablet (40 mg) by mouth every morning (before breakfast) (Patient not taking: Reported on 6/4/2021) 30 tablet 1     potassium chloride ER (KLOR-CON M) 20 MEQ CR tablet Take 1 tablet (20 mEq) by mouth daily 90 tablet 1     senna-docusate (SENOKOT-S/PERICOLACE) 8.6-50 MG tablet Take 1 tablet by mouth 2 times daily as needed for constipation (Patient not taking: Reported on 6/4/2021)  "30 tablet 0     sertraline (ZOLOFT) 100 MG tablet Take 100 mg by mouth every morning        spironolactone (ALDACTONE) 25 MG tablet Take 1 tablet (25 mg) by mouth daily 90 tablet 3     sulfacetamide sodium-sulfur 10-5 % LOTN as needed        torsemide (DEMADEX) 20 MG tablet Take 2 tablets (40 mg) by mouth daily For additional refills, please schedule a follow-up appointment at [phone], labs due 180 tablet 1             REVIEW OF SYSTEMS:     CONSTITUTIONAL:  No fevers, chills or sweats.    ENT:  No visual disturbance, epistaxis or sore throat.   ALLERGIES:  Negative.   RESPIRATORY:  No cough or hemoptysis.   CARDIOVASCULAR:  As per HPI.   GASTROINTESTINAL:  No nausea, vomiting, hematemesis, melena or hematochezia.   GENITOURINARY:  No urinary frequency, dysuria or hematuria.   PSYCHIATRIC:  Negative  NEUROLOGIC:  Negative.   ENDOCRINE:  Negative.   MUSCULOSKELETAL:  Negative.  No recent arthritis flares.      PHYSICAL EXAMINATION:   /75 (BP Location: Right arm, Patient Position: Chair, Cuff Size: Adult Regular)   Pulse 79   Ht 1.637 m (5' 4.45\")   Wt 62.6 kg (137 lb 14.4 oz)   SpO2 100%   BMI 23.34 kg/m    GENERAL:  The patient appears well and appears her stated age.  Breathing is comfortable at rest.   HEENT:  Moist mucous membranes.  No scleral icterus.   NECK:  No thyromegaly or masses.     CARDIAC:  RRR, no murmur at LLSB. +S1/S2. JVP 4 cm.   LUNGS:  Clear to auscultation bilaterally.   ABDOMEN:  Soft, nontender, non distended.  EXTREMITIES:  No lower extremity edema.  Normal cap refill.   NEUROLOGIC:  Normal speech and affect.  No gross deficits.  Normal gait.     TTE 04/22  Left ventricular size, wall motion and function are normal. The ejection  fraction is 60-65%.     Right ventricular function, chamber size, wall motion, and thickness are  normal.     Left ventricular diastolic function is normal.     Diastolic Doppler findings (E/E' ratio and/or other parameters) suggest left  ventricular " filling pressures are normal.     Mild pulmonary hypertension is present with the estimated right ventricular  systolic pressure is 36mmHg above the right atrial pressure which is estimated  at 3mmHg..     Compared to prior imaging on 6/4/2021 today's exam is consistent with mild  pulmonary hypertension.     TTE 3/22/2021  Global and regional left ventricular function is normal with an EF of 55-60%.  Global right ventricular function is mildly reduced.  Mild tricuspid insufficiency is present.  Dilation of the inferior vena cava is present with abnormal respiratory  variation in diameter.  Post pericardiectomy. No pericardial effusion is present.    TTE 3/30/2021  Global and regional left ventricular function is normal with an EF of 60-65%.  Global right ventricular function is normal.  Moderate tricuspid insufficiency is present.  Pulmonary artery systolic pressure is normal.  The inferior vena cava was normal in size with preserved respiratory  variability.  No pericardial effusion is present.    TTE 2/10/2021  There is pericardial tethering, septal bounce, and dilated IVC. While these  findings are supportive of pericardial constriction, other typical findings of  constriction (respirophasic septal shift, AV valve inflow velocities and  hepatic vein velocities suggestive of enhanced ventricular interaction, and  annulus reversus Tissue Doppler pattern) are not present.  Tissue Dopple e' velocities are normal, suggesting against restrictive  cardiomyopathy.     Left ventricular function, chamber size, wall motion, and wall thickness are  normal.The EF is 55-60%.  Right ventricular function, chamber size, wall motion, and thickness are  normal.  No significant valvular abnormalities were noted.  Previous study not available for comparison.  echocardiogram from 09/14/2020:  Normal LV size, normal LV function, ejection fraction 60%, normal RV size and function.  Cardiac valves are normal.  IVC is dilated.  No  pericardial effusion.  Atrial sizes are also normal.  LV end-diastolic dimension of 4.3 cm.      Right heart catheterization from 2019 shows a right atrial pressure of 24, RV 35/22, wedge pressure of 22, pulmonary artery pressure 35/27, mean of 27, Madelin cardiac index 2.4.  Coronary angiogram was not performed.      Cardiac MRI demonstrated normal LV size, ejection fraction 50%.  Normal RV size and function.  Pericardial thickness appears to be normal.  Trivial pericardial effusion.  No intraventricular interdependence.  With gadolinium, there was hyperenhancement of the pericardium suggestive of acute inflammatory pericarditis.  There was no evidence of infiltrative cardiomyopathy.  HIV is negative.  Rheumatoid factor is negative.  Double-stranded DNA is equivocal.  NT-proBNP is 145 and has been very low as well in the past.      ASSESSMENT AND RECOMMENDATIONS:  In summary, this is a very pleasant, 37-year-old woman with a long-standing history of lupus, and constrictive pericarditis complete pericardiectomy with Dr. Jang on 3/15/21. Here for follow up. She is doing well without residual symptoms and euvolemic on exam.      Recent echo in April 2022 showed mild to moderate tricuspid regurgitation with mild pulmonary hypertension.  Given her history of SLE, we need to monitor her for pulmonary hypertension.  We will repeat an echo in the next clinic visit and if she continues to have evidence of pulmonary hypertension we would move forward with a right heart catheterization to evaluate her PVR.    #Recurrent pericarditis c/b constriction s/p pericardiectomy  #SLE with pericardial involvement  #Mild to moderate TR  -Continue torsemide 20 mg twice daily  - continue spironolactone 25mg daily    RTC: 6 months with Dr. Parikh.     Patient seen and examined with Dr. Jl Moe MD  Fellow, pediatric cardiology

## 2022-12-01 NOTE — NURSING NOTE
Chief Complaint   Patient presents with     Follow Up     Jl RHF, labs prior     Vitals were taken and medications reconciled.    Pato Quiles, EMT  3:56 PM

## 2022-12-01 NOTE — LETTER
2022      RE: Sissy Andersonczmarek  [address]  [facility] [address]       Dear Colleague,    Thank you for the opportunity to participate in the care of your patient, Sissy Donaldson, at the [facility] [place] [facility]. Please see a copy of my visit note below.      2022     Jh Garcia MD   [facility]    [address] [phone]      Patria Iyer MD   [facility]    [address] [id number]      RE: Sissy Donaldson    MRN: [id number]   : 1985      Dear Colleagues:       I had the pleasure of seeing Sissy Donaldson in the [facility] in follow up.  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 Jang on 3/15/21. Here for follow up.     Her lupus history begins about 12 years ago when she had arthritis in several joints as well as ITP.  She was placed on several months of steroids and eventually was on hydroxychloroquine with good control.  Five years ago, she had her first pericardial effusion, which was treated with anti-inflammatories.  She has had a recurrence of pericardial effusion in the last 2 years and also had onset of dyspnea on exertion.  She had abdominal bloating and lower extremity edema as well as generalized fatigue.  She has had significant evaluation for this, including right and left heart catheterization as well as a cardiac MRI.  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  This was removed over a year ago without improvement in her abdominal edema.  She also had several different paracenteses that had demonstrated transudative fluid.  Of  note, her filling pressures on her right heart catheterization were significantly elevated.  On her cardiac MRI, she had no fibrosis or evidence of infiltrative disease.      Given the concern for constriction she underwent complete pericardiectomy with Dr. Jang on 3/15/21. Post-operative TTE 3/18/2021 revealed severe TR w/ moderate RV dilation and mild RVSD. She progressed well with diuresis and her TR reduced to mild to moderate TR per serial echocardiograms.     Since her last visit 8/21 she has been doing well. She takes torsemide 20mg BID and aldactone daily. She is overall doing well. Denies orthopnea, dizziness, syncope or presyncope. Denies chest pain, shortness of breath, HOUSER.        PAST MEDICAL HISTORY:   1.  Lupus as detailed above.   2.  Pericardial effusions with recurrence and evidence of constrictive pericarditis s/p pericardiectomy 3/15/21.   3.  History of anxiety.   4.  GERD.      FAMILY HISTORY:  Notable for several family members with autoimmune disease on mother's side.  There is a cousin with rheumatoid arthritis, an aunt with psoriatic arthritis and a great aunt with lupus, again all on the mother's side.  Her siblings do not have any autoimmune disease.  No one else has a history of cardiac disease in the family.      SOCIAL HISTORY:  The patient is  and has a son who is around 12 that she shares half custody with.  She does not smoke, but does occasionally drink social alcohol.      Current Outpatient Medications   Medication Sig Dispense Refill     acetaminophen (TYLENOL) 325 MG tablet Take 1-2 tablets (325-650 mg) by mouth every 6 hours as needed for pain 50 tablet 0     cholecalciferol 50 MCG (2000 UT) tablet Take 2,000 Units by mouth every morning        hydroxychloroquine (PLAQUENIL) 200 MG tablet Take 200 mg by mouth every morning        metroNIDAZOLE (METROCREAM) 0.75 % external cream as needed        pantoprazole (PROTONIX) 40 MG EC tablet Take 1 tablet (40 mg) by mouth  "every morning (before breakfast) (Patient not taking: Reported on 6/4/2021) 30 tablet 1     potassium chloride ER (KLOR-CON M) 20 MEQ CR tablet Take 1 tablet (20 mEq) by mouth daily 90 tablet 1     senna-docusate (SENOKOT-S/PERICOLACE) 8.6-50 MG tablet Take 1 tablet by mouth 2 times daily as needed for constipation (Patient not taking: Reported on 6/4/2021) 30 tablet 0     sertraline (ZOLOFT) 100 MG tablet Take 100 mg by mouth every morning        spironolactone (ALDACTONE) 25 MG tablet Take 1 tablet (25 mg) by mouth daily 90 tablet 3     sulfacetamide sodium-sulfur 10-5 % LOTN as needed        torsemide (DEMADEX) 20 MG tablet Take 2 tablets (40 mg) by mouth daily For additional refills, please schedule a follow-up appointment at [phone], labs due 180 tablet 1             REVIEW OF SYSTEMS:     CONSTITUTIONAL:  No fevers, chills or sweats.    ENT:  No visual disturbance, epistaxis or sore throat.   ALLERGIES:  Negative.   RESPIRATORY:  No cough or hemoptysis.   CARDIOVASCULAR:  As per HPI.   GASTROINTESTINAL:  No nausea, vomiting, hematemesis, melena or hematochezia.   GENITOURINARY:  No urinary frequency, dysuria or hematuria.   PSYCHIATRIC:  Negative  NEUROLOGIC:  Negative.   ENDOCRINE:  Negative.   MUSCULOSKELETAL:  Negative.  No recent arthritis flares.      PHYSICAL EXAMINATION:   /75 (BP Location: Right arm, Patient Position: Chair, Cuff Size: Adult Regular)   Pulse 79   Ht 1.637 m (5' 4.45\")   Wt 62.6 kg (137 lb 14.4 oz)   SpO2 100%   BMI 23.34 kg/m    GENERAL:  The patient appears well and appears her stated age.  Breathing is comfortable at rest.   HEENT:  Moist mucous membranes.  No scleral icterus.   NECK:  No thyromegaly or masses.     CARDIAC:  RRR, no murmur at LLSB. +S1/S2. JVP 4 cm.   LUNGS:  Clear to auscultation bilaterally.   ABDOMEN:  Soft, nontender, non distended.  EXTREMITIES:  No lower extremity edema.  Normal cap refill.   NEUROLOGIC:  Normal speech and affect.  No gross " deficits.  Normal gait.     TTE 04/22  Left ventricular size, wall motion and function are normal. The ejection  fraction is 60-65%.     Right ventricular function, chamber size, wall motion, and thickness are  normal.     Left ventricular diastolic function is normal.     Diastolic Doppler findings (E/E' ratio and/or other parameters) suggest left  ventricular filling pressures are normal.     Mild pulmonary hypertension is present with the estimated right ventricular  systolic pressure is 36mmHg above the right atrial pressure which is estimated  at 3mmHg..     Compared to prior imaging on 6/4/2021 today's exam is consistent with mild  pulmonary hypertension.     TTE 3/22/2021  Global and regional left ventricular function is normal with an EF of 55-60%.  Global right ventricular function is mildly reduced.  Mild tricuspid insufficiency is present.  Dilation of the inferior vena cava is present with abnormal respiratory  variation in diameter.  Post pericardiectomy. No pericardial effusion is present.    TTE 3/30/2021  Global and regional left ventricular function is normal with an EF of 60-65%.  Global right ventricular function is normal.  Moderate tricuspid insufficiency is present.  Pulmonary artery systolic pressure is normal.  The inferior vena cava was normal in size with preserved respiratory  variability.  No pericardial effusion is present.    TTE 2/10/2021  There is pericardial tethering, septal bounce, and dilated IVC. While these  findings are supportive of pericardial constriction, other typical findings of  constriction (respirophasic septal shift, AV valve inflow velocities and  hepatic vein velocities suggestive of enhanced ventricular interaction, and  annulus reversus Tissue Doppler pattern) are not present.  Tissue Dopple e' velocities are normal, suggesting against restrictive  cardiomyopathy.     Left ventricular function, chamber size, wall motion, and wall thickness are  normal.The EF is  55-60%.  Right ventricular function, chamber size, wall motion, and thickness are  normal.  No significant valvular abnormalities were noted.  Previous study not available for comparison.  echocardiogram from 09/14/2020:  Normal LV size, normal LV function, ejection fraction 60%, normal RV size and function.  Cardiac valves are normal.  IVC is dilated.  No pericardial effusion.  Atrial sizes are also normal.  LV end-diastolic dimension of 4.3 cm.      Right heart catheterization from 2019 shows a right atrial pressure of 24, RV 35/22, wedge pressure of 22, pulmonary artery pressure 35/27, mean of 27, Madelin cardiac index 2.4.  Coronary angiogram was not performed.      Cardiac MRI demonstrated normal LV size, ejection fraction 50%.  Normal RV size and function.  Pericardial thickness appears to be normal.  Trivial pericardial effusion.  No intraventricular interdependence.  With gadolinium, there was hyperenhancement of the pericardium suggestive of acute inflammatory pericarditis.  There was no evidence of infiltrative cardiomyopathy.  HIV is negative.  Rheumatoid factor is negative.  Double-stranded DNA is equivocal.  NT-proBNP is 145 and has been very low as well in the past.      ASSESSMENT AND RECOMMENDATIONS:  In summary, this is a very pleasant, 37-year-old woman with a long-standing history of lupus, and constrictive pericarditis complete pericardiectomy with Dr. Jang on 3/15/21. Here for follow up. She is doing well without residual symptoms and euvolemic on exam.      Recent echo in April 2022 showed mild to moderate tricuspid regurgitation with mild pulmonary hypertension.  Given her history of SLE, we need to monitor her for pulmonary hypertension.  We will repeat an echo in the next clinic visit and if she continues to have evidence of pulmonary hypertension we would move forward with a right heart catheterization to evaluate her PVR.    #Recurrent pericarditis c/b constriction s/p pericardiectomy  #SLE  with pericardial involvement  #Mild to moderate TR  -Continue torsemide 20 mg twice daily  - continue spironolactone 25mg daily    RTC: 6 months with Dr. Parikh.     Patient seen and examined with Dr. Jl Moe MD  Fellow, pediatric cardiology      Attestation signed by Jl, Xuan Bryson MD at 12/15/2022  1:55 PM:  I have seen and examined the patient with the house staff on December 1, 2022  and agree with the outlined assessment and plan.    Will keep an eye on the PA pressures with the next echo. If elevated may perform RHC to ensure she does not need PAH evaluation and treatment.       Xuan Parikh MD  Associate Professor of Medicine   [facility] Division of Cardiology

## 2023-01-03 DIAGNOSIS — Z98.890 HISTORY OF PERICARDIECTOMY: ICD-10-CM

## 2023-01-05 RX ORDER — TORSEMIDE 20 MG/1
40 TABLET ORAL DAILY
Qty: 180 TABLET | Refills: 1 | Status: SHIPPED | OUTPATIENT
Start: 2023-01-05

## 2023-01-05 NOTE — TELEPHONE ENCOUNTER
TORSEMIDE 20MG TABLETS  Last Written Prescription Date:   8/29/2022  Last Fill Quantity: 180,   # refills: 1  Last Office Visit :  12/1/2022  Future Office visit:  None  180 Tabs,  1 Refills sent to debbie Smith RN  Central Triage Red Flags/Med Refills

## 2023-04-23 ENCOUNTER — HEALTH MAINTENANCE LETTER (OUTPATIENT)
Age: 38
End: 2023-04-23

## 2023-05-30 DIAGNOSIS — I50.32 CHRONIC DIASTOLIC CONGESTIVE HEART FAILURE (H): Primary | ICD-10-CM

## 2023-06-01 ENCOUNTER — ANCILLARY PROCEDURE (OUTPATIENT)
Dept: CARDIOLOGY | Facility: CLINIC | Age: 38
End: 2023-06-01
Attending: INTERNAL MEDICINE
Payer: COMMERCIAL

## 2023-06-01 ENCOUNTER — LAB (OUTPATIENT)
Dept: LAB | Facility: CLINIC | Age: 38
End: 2023-06-01
Attending: INTERNAL MEDICINE
Payer: COMMERCIAL

## 2023-06-01 DIAGNOSIS — I50.32 CHRONIC DIASTOLIC CONGESTIVE HEART FAILURE (H): ICD-10-CM

## 2023-06-01 LAB
ANION GAP SERPL CALCULATED.3IONS-SCNC: 8 MMOL/L (ref 7–15)
BUN SERPL-MCNC: 12.5 MG/DL (ref 6–20)
CALCIUM SERPL-MCNC: 9.4 MG/DL (ref 8.6–10)
CHLORIDE SERPL-SCNC: 102 MMOL/L (ref 98–107)
CREAT SERPL-MCNC: 0.8 MG/DL (ref 0.51–0.95)
DEPRECATED HCO3 PLAS-SCNC: 31 MMOL/L (ref 22–29)
GFR SERPL CREATININE-BSD FRML MDRD: >90 ML/MIN/1.73M2
GLUCOSE SERPL-MCNC: 97 MG/DL (ref 70–99)
LVEF ECHO: NORMAL
NT-PROBNP SERPL-MCNC: 289 PG/ML (ref 0–450)
POTASSIUM SERPL-SCNC: 4.8 MMOL/L (ref 3.4–5.3)
SODIUM SERPL-SCNC: 141 MMOL/L (ref 136–145)

## 2023-06-01 PROCEDURE — 36415 COLL VENOUS BLD VENIPUNCTURE: CPT | Performed by: PATHOLOGY

## 2023-06-01 PROCEDURE — 80048 BASIC METABOLIC PNL TOTAL CA: CPT | Performed by: PATHOLOGY

## 2023-06-01 PROCEDURE — 93306 TTE W/DOPPLER COMPLETE: CPT | Performed by: INTERNAL MEDICINE

## 2023-06-01 PROCEDURE — 83880 ASSAY OF NATRIURETIC PEPTIDE: CPT | Performed by: PATHOLOGY

## 2023-06-08 ENCOUNTER — OFFICE VISIT (OUTPATIENT)
Dept: CARDIOLOGY | Facility: CLINIC | Age: 38
End: 2023-06-08
Attending: INTERNAL MEDICINE
Payer: COMMERCIAL

## 2023-06-08 VITALS
DIASTOLIC BLOOD PRESSURE: 75 MMHG | HEIGHT: 64 IN | WEIGHT: 139.3 LBS | BODY MASS INDEX: 23.78 KG/M2 | HEART RATE: 70 BPM | OXYGEN SATURATION: 100 % | SYSTOLIC BLOOD PRESSURE: 113 MMHG

## 2023-06-08 DIAGNOSIS — Z98.890 HISTORY OF PERICARDIECTOMY: Primary | ICD-10-CM

## 2023-06-08 PROCEDURE — G0463 HOSPITAL OUTPT CLINIC VISIT: HCPCS | Performed by: INTERNAL MEDICINE

## 2023-06-08 PROCEDURE — 99214 OFFICE O/P EST MOD 30 MIN: CPT | Mod: GC | Performed by: INTERNAL MEDICINE

## 2023-06-08 ASSESSMENT — PAIN SCALES - GENERAL: PAINLEVEL: NO PAIN (0)

## 2023-06-08 NOTE — NURSING NOTE
Chief Complaint   Patient presents with     Follow Up     Jl RHF, labs and echo prior       Vitals were taken, medications reconciled.    Amy Olmos, EMT   8:43 AM

## 2023-06-08 NOTE — PROGRESS NOTES
Advanced Heart Failure Clinic Follow Up  6/8/2023    HPI:   Sissy Donaldson is a very pleasant, 37-year-old woman with a long-standing history of lupus who has had recurrent pericardial effusions and constrictive pericarditis now s/p complete pericardiectomy with Dr. Jang on 3/15/21. Here for follow up.     Her lupus history begins about 12 years ago when she had arthritis in several joints as well as ITP.  She was placed on several months of steroids and eventually was on hydroxychloroquine with good control.  Five years ago, she had her first pericardial effusion, which was treated with anti-inflammatories.  She has had a recurrence of pericardial effusion in the last 2 years and also had onset of dyspnea on exertion.  She had abdominal bloating and lower extremity edema as well as generalized fatigue.  She has had significant evaluation for this, including right and left heart catheterization as well as a cardiac MRI.  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  This was removed over a year ago without improvement in her abdominal edema.  She also had several different paracenteses that had demonstrated transudative fluid.  Of note, her filling pressures on her right heart catheterization were significantly elevated.  On her cardiac MRI, she had no fibrosis or evidence of infiltrative disease.      Given the concern for constriction she underwent complete pericardiectomy with Dr. Jang on 3/15/21. Post-operative TTE 3/18/2021 revealed severe TR w/ moderate RV dilation and mild RVSD. She progressed well with diuresis and her TR reduced to mild to moderate TR per serial echocardiograms.     Since her last visit on 12/1/2022, she has been feeling well. She tried coming completely off torsemide but gained 5 lbs over a weekend so resumed it at 20 mg daily which has kept her weight stable. She has no dyspnea on exertion, chest pain, orthopnea, PND, leg swelling, abdominal  pain/bloating, palpitations, syncope.        PAST MEDICAL HISTORY:   1.  Lupus as detailed above.   2.  Pericardial effusions with recurrence and evidence of constrictive pericarditis s/p pericardiectomy 3/15/21.   3.  History of anxiety.   4.  GERD.      FAMILY HISTORY:  Notable for several family members with autoimmune disease on mother's side.  There is a cousin with rheumatoid arthritis, an aunt with psoriatic arthritis and a great aunt with lupus, again all on the mother's side.  Her siblings do not have any autoimmune disease.  No one else has a history of cardiac disease in the family.      SOCIAL HISTORY:  The patient is  and has a son who is around 12 that she shares half custody with.  She does not smoke, but does occasionally drink social alcohol.      Current Outpatient Medications   Medication Sig Dispense Refill     acetaminophen (TYLENOL) 325 MG tablet Take 1-2 tablets (325-650 mg) by mouth every 6 hours as needed for pain 50 tablet 0     cholecalciferol 50 MCG (2000 UT) tablet Take 2,000 Units by mouth every morning        hydroxychloroquine (PLAQUENIL) 200 MG tablet Take 200 mg by mouth every morning        sertraline (ZOLOFT) 100 MG tablet Take 100 mg by mouth every morning        spironolactone (ALDACTONE) 25 MG tablet Take 1 tablet (25 mg) by mouth daily 90 tablet 3     torsemide (DEMADEX) 20 MG tablet Take 2 tablets (40 mg) by mouth daily 180 tablet 1     metroNIDAZOLE (METROCREAM) 0.75 % external cream as needed        pantoprazole (PROTONIX) 40 MG EC tablet Take 1 tablet (40 mg) by mouth every morning (before breakfast) (Patient not taking: Reported on 6/4/2021) 30 tablet 1     potassium chloride ER (KLOR-CON M) 20 MEQ CR tablet Take 1 tablet (20 mEq) by mouth daily 90 tablet 1     senna-docusate (SENOKOT-S/PERICOLACE) 8.6-50 MG tablet Take 1 tablet by mouth 2 times daily as needed for constipation (Patient not taking: Reported on 6/4/2021) 30 tablet 0     sulfacetamide sodium-sulfur  "10-5 % LOTN as needed                REVIEW OF SYSTEMS:     Negative except as per HPI      PHYSICAL EXAMINATION:   /75 (BP Location: Right arm, Patient Position: Sitting, Cuff Size: Adult Regular)   Pulse 70   Ht 1.637 m (5' 4.45\")   Wt 63.2 kg (139 lb 4.8 oz)   SpO2 100%   BMI 23.58 kg/m    GENERAL:  The patient appears well and appears her stated age.  Breathing is comfortable at rest.   HEENT:  Moist mucous membranes.  No scleral icterus.   NECK:  No thyromegaly or masses.     CARDIAC:  RRR, no murmur. +S1/S2. JVP 4 cm.   LUNGS:  Clear to auscultation bilaterally.   ABDOMEN:  Soft, nontender, non distended.  EXTREMITIES:  No lower extremity edema.  Normal cap refill.   NEUROLOGIC:  Normal speech and affect.  No gross deficits.  Normal gait.       6/2023  Interpretation Summary  Left ventricular size, wall motion and function are normal. The ejection  fraction is 60-65%.  Left ventricular diastolic function is normal.  Global right ventricular function is normal.  Estimated pulmonary artery systolic pressure is 28 mmHg.  The inferior vena cava is normal.   This study was compared with the study from 4/7/22. Estimated PA pressure is lower.    TTE 04/22  Left ventricular size, wall motion and function are normal. The ejection  fraction is 60-65%.  Right ventricular function, chamber size, wall motion, and thickness are  normal.  Left ventricular diastolic function is normal.  Diastolic Doppler findings (E/E' ratio and/or other parameters) suggest left  ventricular filling pressures are normal.  Mild pulmonary hypertension is present with the estimated right ventricular  systolic pressure is 36mmHg above the right atrial pressure which is estimated  at 3mmHg.  Compared to prior imaging on 6/4/2021 today's exam is consistent with mild  pulmonary hypertension.     TTE 3/22/2021  Global and regional left ventricular function is normal with an EF of 55-60%.  Global right ventricular function is mildly " reduced.  Mild tricuspid insufficiency is present.  Dilation of the inferior vena cava is present with abnormal respiratory  variation in diameter.  Post pericardiectomy. No pericardial effusion is present.    TTE 3/30/2021  Global and regional left ventricular function is normal with an EF of 60-65%.  Global right ventricular function is normal.  Moderate tricuspid insufficiency is present.  Pulmonary artery systolic pressure is normal.  The inferior vena cava was normal in size with preserved respiratory  variability.  No pericardial effusion is present.    TTE 2/10/2021  There is pericardial tethering, septal bounce, and dilated IVC. While these  findings are supportive of pericardial constriction, other typical findings of  constriction (respirophasic septal shift, AV valve inflow velocities and  hepatic vein velocities suggestive of enhanced ventricular interaction, and  annulus reversus Tissue Doppler pattern) are not present.  Tissue Dopple e' velocities are normal, suggesting against restrictive  cardiomyopathy.     Left ventricular function, chamber size, wall motion, and wall thickness are  normal.The EF is 55-60%.  Right ventricular function, chamber size, wall motion, and thickness are  normal.  No significant valvular abnormalities were noted.  Previous study not available for comparison.  echocardiogram from 09/14/2020:  Normal LV size, normal LV function, ejection fraction 60%, normal RV size and function.  Cardiac valves are normal.  IVC is dilated.  No pericardial effusion.  Atrial sizes are also normal.  LV end-diastolic dimension of 4.3 cm.      Right heart catheterization from 2019 shows a right atrial pressure of 24, RV 35/22, wedge pressure of 22, pulmonary artery pressure 35/27, mean of 27, Madelin cardiac index 2.4.  Coronary angiogram was not performed.      Cardiac MRI demonstrated normal LV size, ejection fraction 50%.  Normal RV size and function.  Pericardial thickness appears to be normal.   Trivial pericardial effusion.  No intraventricular interdependence.  With gadolinium, there was hyperenhancement of the pericardium suggestive of acute inflammatory pericarditis.  There was no evidence of infiltrative cardiomyopathy.  HIV is negative.  Rheumatoid factor is negative.  Double-stranded DNA is equivocal.  NT-proBNP is 145 and has been very low as well in the past.          ASSESSMENT AND RECOMMENDATIONS:  In summary, this is a very pleasant, 37-year-old woman with a long-standing history of lupus, and constrictive pericarditis complete pericardiectomy with Dr. Jang on 3/15/21. Here for follow up. She is doing well without residual symptoms and euvolemic on exam.      #Recurrent pericarditis c/b constriction s/p pericardiectomy  #SLE with pericardial involvement  #Mild TR  -Continue torsemide 20 mg once daily - discussed that she could try coming off this at some point, she has mild residual TR at this point with normal estimated PA systolic pressures on echo. Should she gain >3 lbs in a day or 5 lbs in 3 days she should resume   -Continue spironolactone 25mg daily    RTC: return to Dr. Delong at [facility] for further cardiology cares      Patient discussed with Dr. Jl Richardson MD   Cardiology Fellow

## 2023-06-08 NOTE — NURSING NOTE
Follow up with Dr Neri in 6 months to a year.  Ok to fill refills until she's able to re-establish care.

## 2023-12-14 ENCOUNTER — TRANSFERRED RECORDS (OUTPATIENT)
Dept: HEALTH INFORMATION MANAGEMENT | Facility: CLINIC | Age: 38
End: 2023-12-14
Payer: COMMERCIAL

## 2023-12-18 ENCOUNTER — MEDICAL CORRESPONDENCE (OUTPATIENT)
Dept: HEALTH INFORMATION MANAGEMENT | Facility: CLINIC | Age: 38
End: 2023-12-18
Payer: COMMERCIAL

## 2023-12-18 ENCOUNTER — TRANSFERRED RECORDS (OUTPATIENT)
Dept: HEALTH INFORMATION MANAGEMENT | Facility: CLINIC | Age: 38
End: 2023-12-18
Payer: COMMERCIAL

## 2023-12-18 ENCOUNTER — TRANSCRIBE ORDERS (OUTPATIENT)
Dept: MATERNAL FETAL MEDICINE | Facility: CLINIC | Age: 38
End: 2023-12-18
Payer: COMMERCIAL

## 2023-12-18 DIAGNOSIS — Z31.69 ENCOUNTER FOR PRECONCEPTION CONSULTATION: Primary | ICD-10-CM

## 2023-12-21 ENCOUNTER — TRANSCRIBE ORDERS (OUTPATIENT)
Dept: MATERNAL FETAL MEDICINE | Facility: CLINIC | Age: 38
End: 2023-12-21
Payer: COMMERCIAL

## 2023-12-21 DIAGNOSIS — Z31.69 ENCOUNTER FOR PRECONCEPTION CONSULTATION: Primary | ICD-10-CM

## 2024-03-25 NOTE — LETTER
6/8/2023      RE: Sissy Donaldson  [phone]  [place] [phone]       Dear Colleague,    Thank you for the opportunity to participate in the care of your patient, Sissy Donaldson, at the [facility] [place] [facility]. Please see a copy of my visit note below.    Advanced Heart Failure Clinic Follow Up  6/8/2023    HPI:   Sissy Donaldson is a very pleasant, 37-year-old woman with a long-standing history of lupus who has had recurrent pericardial effusions and constrictive pericarditis now s/p complete pericardiectomy with Dr. Jang on 3/15/21. Here for follow up.     Her lupus history begins about 12 years ago when she had arthritis in several joints as well as ITP.  She was placed on several months of steroids and eventually was on hydroxychloroquine with good control.  Five years ago, she had her first pericardial effusion, which was treated with anti-inflammatories.  She has had a recurrence of pericardial effusion in the last 2 years and also had onset of dyspnea on exertion.  She had abdominal bloating and lower extremity edema as well as generalized fatigue.  She has had significant evaluation for this, including right and left heart catheterization as well as a cardiac MRI.  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  This was removed over a year ago without improvement in her abdominal edema.  She also had several different paracenteses that had demonstrated transudative fluid.  Of note, her filling pressures on her right heart catheterization were significantly elevated.  On her cardiac MRI, she had no fibrosis or evidence of infiltrative disease.      Given the concern for constriction she underwent complete pericardiectomy with Dr. Jang on 3/15/21. Post-operative TTE 3/18/2021 revealed severe TR w/ moderate RV dilation and mild RVSD. She progressed well with diuresis and her TR  Recommended RTC 1 year or as needed  Last mammo if applicable 1/30/2024 Negative/Normal  Last pap if applicable 1/6/2021 Negative for intraepithelial lesion or malignancy/ NEG HPV.  Previously filled 3/24/2023  Allergies reviewed No known allergies      Medication: Estradiol cream  Last office visit date: 3/22/2023  Medication Refill Protocol Failed.  Failed criteria: hasn't been seen in over a year. Sent to clinician to review.       reduced to mild to moderate TR per serial echocardiograms.     Since her last visit on 12/1/2022, she has been feeling well. She tried coming completely off torsemide but gained 5 lbs over a weekend so resumed it at 20 mg daily which has kept her weight stable. She has no dyspnea on exertion, chest pain, orthopnea, PND, leg swelling, abdominal pain/bloating, palpitations, syncope.        PAST MEDICAL HISTORY:   1.  Lupus as detailed above.   2.  Pericardial effusions with recurrence and evidence of constrictive pericarditis s/p pericardiectomy 3/15/21.   3.  History of anxiety.   4.  GERD.      FAMILY HISTORY:  Notable for several family members with autoimmune disease on mother's side.  There is a cousin with rheumatoid arthritis, an aunt with psoriatic arthritis and a great aunt with lupus, again all on the mother's side.  Her siblings do not have any autoimmune disease.  No one else has a history of cardiac disease in the family.      SOCIAL HISTORY:  The patient is  and has a son who is around 12 that she shares half custody with.  She does not smoke, but does occasionally drink social alcohol.      Current Outpatient Medications   Medication Sig Dispense Refill    acetaminophen (TYLENOL) 325 MG tablet Take 1-2 tablets (325-650 mg) by mouth every 6 hours as needed for pain 50 tablet 0    cholecalciferol 50 MCG (2000 UT) tablet Take 2,000 Units by mouth every morning       hydroxychloroquine (PLAQUENIL) 200 MG tablet Take 200 mg by mouth every morning       sertraline (ZOLOFT) 100 MG tablet Take 100 mg by mouth every morning       spironolactone (ALDACTONE) 25 MG tablet Take 1 tablet (25 mg) by mouth daily 90 tablet 3    torsemide (DEMADEX) 20 MG tablet Take 2 tablets (40 mg) by mouth daily 180 tablet 1    metroNIDAZOLE (METROCREAM) 0.75 % external cream as needed       pantoprazole (PROTONIX) 40 MG EC tablet Take 1 tablet (40 mg) by mouth every morning (before breakfast) (Patient not taking: Reported on  "6/4/2021) 30 tablet 1    potassium chloride ER (KLOR-CON M) 20 MEQ CR tablet Take 1 tablet (20 mEq) by mouth daily 90 tablet 1    senna-docusate (SENOKOT-S/PERICOLACE) 8.6-50 MG tablet Take 1 tablet by mouth 2 times daily as needed for constipation (Patient not taking: Reported on 6/4/2021) 30 tablet 0    sulfacetamide sodium-sulfur 10-5 % LOTN as needed                REVIEW OF SYSTEMS:     Negative except as per HPI      PHYSICAL EXAMINATION:   /75 (BP Location: Right arm, Patient Position: Sitting, Cuff Size: Adult Regular)   Pulse 70   Ht 1.637 m (5' 4.45\")   Wt 63.2 kg (139 lb 4.8 oz)   SpO2 100%   BMI 23.58 kg/m    GENERAL:  The patient appears well and appears her stated age.  Breathing is comfortable at rest.   HEENT:  Moist mucous membranes.  No scleral icterus.   NECK:  No thyromegaly or masses.     CARDIAC:  RRR, no murmur. +S1/S2. JVP 4 cm.   LUNGS:  Clear to auscultation bilaterally.   ABDOMEN:  Soft, nontender, non distended.  EXTREMITIES:  No lower extremity edema.  Normal cap refill.   NEUROLOGIC:  Normal speech and affect.  No gross deficits.  Normal gait.       6/2023  Interpretation Summary  Left ventricular size, wall motion and function are normal. The ejection  fraction is 60-65%.  Left ventricular diastolic function is normal.  Global right ventricular function is normal.  Estimated pulmonary artery systolic pressure is 28 mmHg.  The inferior vena cava is normal.   This study was compared with the study from 4/7/22. Estimated PA pressure is lower.    TTE 04/22  Left ventricular size, wall motion and function are normal. The ejection  fraction is 60-65%.  Right ventricular function, chamber size, wall motion, and thickness are  normal.  Left ventricular diastolic function is normal.  Diastolic Doppler findings (E/E' ratio and/or other parameters) suggest left  ventricular filling pressures are normal.  Mild pulmonary hypertension is present with the estimated right " ventricular  systolic pressure is 36mmHg above the right atrial pressure which is estimated  at 3mmHg.  Compared to prior imaging on 6/4/2021 today's exam is consistent with mild  pulmonary hypertension.     TTE 3/22/2021  Global and regional left ventricular function is normal with an EF of 55-60%.  Global right ventricular function is mildly reduced.  Mild tricuspid insufficiency is present.  Dilation of the inferior vena cava is present with abnormal respiratory  variation in diameter.  Post pericardiectomy. No pericardial effusion is present.    TTE 3/30/2021  Global and regional left ventricular function is normal with an EF of 60-65%.  Global right ventricular function is normal.  Moderate tricuspid insufficiency is present.  Pulmonary artery systolic pressure is normal.  The inferior vena cava was normal in size with preserved respiratory  variability.  No pericardial effusion is present.    TTE 2/10/2021  There is pericardial tethering, septal bounce, and dilated IVC. While these  findings are supportive of pericardial constriction, other typical findings of  constriction (respirophasic septal shift, AV valve inflow velocities and  hepatic vein velocities suggestive of enhanced ventricular interaction, and  annulus reversus Tissue Doppler pattern) are not present.  Tissue Dopple e' velocities are normal, suggesting against restrictive  cardiomyopathy.     Left ventricular function, chamber size, wall motion, and wall thickness are  normal.The EF is 55-60%.  Right ventricular function, chamber size, wall motion, and thickness are  normal.  No significant valvular abnormalities were noted.  Previous study not available for comparison.  echocardiogram from 09/14/2020:  Normal LV size, normal LV function, ejection fraction 60%, normal RV size and function.  Cardiac valves are normal.  IVC is dilated.  No pericardial effusion.  Atrial sizes are also normal.  LV end-diastolic dimension of 4.3 cm.      Right heart  catheterization from 2019 shows a right atrial pressure of 24, RV 35/22, wedge pressure of 22, pulmonary artery pressure 35/27, mean of 27, Madelin cardiac index 2.4.  Coronary angiogram was not performed.      Cardiac MRI demonstrated normal LV size, ejection fraction 50%.  Normal RV size and function.  Pericardial thickness appears to be normal.  Trivial pericardial effusion.  No intraventricular interdependence.  With gadolinium, there was hyperenhancement of the pericardium suggestive of acute inflammatory pericarditis.  There was no evidence of infiltrative cardiomyopathy.  HIV is negative.  Rheumatoid factor is negative.  Double-stranded DNA is equivocal.  NT-proBNP is 145 and has been very low as well in the past.          ASSESSMENT AND RECOMMENDATIONS:  In summary, this is a very pleasant, 37-year-old woman with a long-standing history of lupus, and constrictive pericarditis complete pericardiectomy with Dr. Jang on 3/15/21. Here for follow up. She is doing well without residual symptoms and euvolemic on exam.      #Recurrent pericarditis c/b constriction s/p pericardiectomy  #SLE with pericardial involvement  #Mild TR  -Continue torsemide 20 mg once daily - discussed that she could try coming off this at some point, she has mild residual TR at this point with normal estimated PA systolic pressures on echo. Should she gain >3 lbs in a day or 5 lbs in 3 days she should resume   -Continue spironolactone 25mg daily    RTC: [facility]      Patient discussed with Dr. Jl Richardson MD   Cardiology Fellow     Attestation signed by Xuan Parikh MD at 6/8/2023  8:42 PM:  I have seen and examined the patient with the house staff on June 8, 2023  and agree with the outlined assessment and plan.    Referring her back to [facility] - she is doing very well post pericardial stripping and can be referred back if there is any sign of recurrence.          Xuan Parikh MD  Associate Professor of Medicine   [facility] Division of Cardiology

## 2024-04-16 DIAGNOSIS — Z98.890 HISTORY OF PERICARDIECTOMY: ICD-10-CM

## 2024-04-22 ENCOUNTER — PATIENT OUTREACH (OUTPATIENT)
Dept: CARDIOLOGY | Facility: CLINIC | Age: 39
End: 2024-04-22
Payer: COMMERCIAL

## 2024-04-22 ENCOUNTER — TELEPHONE (OUTPATIENT)
Dept: CARDIOLOGY | Facility: CLINIC | Age: 39
End: 2024-04-22
Payer: COMMERCIAL

## 2024-04-22 NOTE — TELEPHONE ENCOUNTER
Sissy states that she is not taking a diuretic and is following up with her primary cardiologist at .  Will decline refill.

## 2024-04-22 NOTE — TELEPHONE ENCOUNTER
torsemide (DEMADEX) 20 MG tablet       Last Written Prescription Date:  1-5-23  Last Fill Quantity: 180,   # refills: 1  Last Office Visit : 6-8-23  Future Office visit:  none    Routing refill request to provider for review/approval because:  Failed protocol: diagnosis  Gap in RF

## 2024-04-22 NOTE — TELEPHONE ENCOUNTER
LVM to verify with Sissy which dose of Torsemide she is taking- prescription says 40 mg daily, but last note says 20 mg daily.

## 2024-04-22 NOTE — TELEPHONE ENCOUNTER
[facility] Call Center    Phone Message    May a detailed message be left on voicemail: yes     Reason for Call: Other: Patient returning a missed call from nurse Alexus. Patient stated Alexus called them in regards to a prescription refill, but patient is unsure of what that medication is as they are currently not taking any medications for their heart at this time. Patient stated they have been off diuretic medication for awhile now as well. Please call patient back to further discuss.     Action Taken: Other: Cardiology    Travel Screening: Not Applicable    Thank you!  [facility]

## 2024-04-25 RX ORDER — TORSEMIDE 20 MG/1
40 TABLET ORAL DAILY
Qty: 180 TABLET | OUTPATIENT
Start: 2024-04-25

## 2024-06-30 ENCOUNTER — HEALTH MAINTENANCE LETTER (OUTPATIENT)
Age: 39
End: 2024-06-30

## 2025-07-13 ENCOUNTER — HEALTH MAINTENANCE LETTER (OUTPATIENT)
Age: 40
End: 2025-07-13

## (undated) DEVICE — KIT HAND CONTROL ACIST 014644 AR-P54

## (undated) DEVICE — DRSG TELFA 3X8" 1238

## (undated) DEVICE — SHTH INTRO 0.021IN ID 6FR DIA

## (undated) DEVICE — CATH ANGIO SUPERTORQUE PLUS JL4 6FRX100CM 533620

## (undated) DEVICE — SU PROLENE 4-0 RB-1DA 36" 8557H

## (undated) DEVICE — TUBING PRESSURE 30"

## (undated) DEVICE — DRSG ABDOMINAL 07 1/2X8" 7197D

## (undated) DEVICE — SOL NACL 0.9% 10ML VIAL 0409-4888-02

## (undated) DEVICE — SU ETHIBOND 3-0 BBDA 36" X588H

## (undated) DEVICE — ESU ELEC BLADE 6" COATED/INSULATED E1455-6

## (undated) DEVICE — DRSG DRAIN 4X4" 7086

## (undated) DEVICE — SU VICRYL 3-0 FS-1 27" J442H

## (undated) DEVICE — CATH ANGIO INFINITI PIGTAIL 145 6 SH 6FRX110CM  534-652S

## (undated) DEVICE — TUBING SUCTION DRAINAGE PLEURAL DUAL 8884714200

## (undated) DEVICE — GUIDEWIRE L80CM OD.035IN 3 CM J-TIP V

## (undated) DEVICE — SPONGE RAY-TEC 4X8" 7318

## (undated) DEVICE — SLEEVE TR BAND RADIAL COMPRESSION DEVICE 24CM TRB24-REG

## (undated) DEVICE — SU PROLENE 6-0 C-1DA 30" 8706H

## (undated) DEVICE — SYR 10ML LL W/O NDL 302995

## (undated) DEVICE — TAPE MEDIPORE 4"X2YD 2864

## (undated) DEVICE — CATH DIAG 4FR ANG PIG 538453S

## (undated) DEVICE — SUCTION MANIFOLD NEPTUNE 2 SYS 4 PORT 0702-020-000

## (undated) DEVICE — BLADE SAW STERNAL 20X30MM KM-32

## (undated) DEVICE — FASTENER CATH BALLOON CLAMPX2 STATLOCK 0684-00-493

## (undated) DEVICE — DEFIB PRO-PADZ LVP LQD GEL ADULT 8900-2105-01

## (undated) DEVICE — LINEN TOWEL PACK X30 5481

## (undated) DEVICE — SU PLEDGET SOFT TFE 3/8"X3/26"X1/16" PCP40

## (undated) DEVICE — CLIP HORIZON MED BLUE 002200

## (undated) DEVICE — SU STEEL 6 CCS 4X18" M654G

## (undated) DEVICE — ESU HOLSTER PLASTIC DISP E2400

## (undated) DEVICE — SU SILK 0 TIE 6X30" A306H

## (undated) DEVICE — SU ETHIBOND 0 CT-1 CR 8X18" CX21D

## (undated) DEVICE — Device

## (undated) DEVICE — PROTECTOR ARM ONE-STEP TRENDELENBURG 40418

## (undated) DEVICE — PREP CHLORAPREP 26ML TINTED ORANGE  260815

## (undated) DEVICE — CATH ANGIO SUPERTORQUE PLUS JR4 6FRX100CM 533621

## (undated) DEVICE — BONE WAX 2.5GM W31G

## (undated) DEVICE — MANIFOLD KIT ANGIO AUTOMATED 014613

## (undated) DEVICE — ADH SKIN CLOSURE PREMIERPRO EXOFIN 1.0ML 3470

## (undated) DEVICE — GLOVE PROTEXIS POWDER FREE 7.0 ORTHOPEDIC 2D73ET70

## (undated) DEVICE — LINEN TOWEL PACK X6 WHITE 5487

## (undated) DEVICE — KIT RIGHT HEART CATH 60130719

## (undated) DEVICE — SU ETHIBOND 2-0 SHDA 30" X563H

## (undated) DEVICE — NDL 21GA 1.5"

## (undated) DEVICE — SU PROLENE 5-0 RB-2DA 30" 8710H

## (undated) DEVICE — FORCEP ENDOMYOCARDIAL BIOPSY STRAIGHT 6FRX50CM 190060

## (undated) DEVICE — DRSG TEGADERM 8X12" 1629

## (undated) DEVICE — LINEN GOWN XLG 5407

## (undated) DEVICE — SU PROLENE 4-0 SHDA 36" 8521H

## (undated) DEVICE — ESU ELEC BLADE 2.75" COATED/INSULATED E1455

## (undated) DEVICE — 0.035IN X 260CM, EMERALD DIAGNOSTIC GUIDEWIRE, FIXED-CORE PTFE COATED, STANDARD, 3MM EXCHANGE J-TIP (EA/1)

## (undated) DEVICE — CATH TRAY FOLEY 16FR BARDEX W/TEMP PRB URINE METER 319416AM

## (undated) DEVICE — DRAIN CHEST TUBE RIGHT ANGLED 28FR 8128

## (undated) DEVICE — SURGICEL HEMOSTAT 4X8" 1952

## (undated) DEVICE — INTRO SHEATH 7FRX10CM PINNACLE RSS702

## (undated) DEVICE — SU VICRYL 0 CTX 36" J370H

## (undated) DEVICE — SOL NACL 0.9% IRRIG 1000ML BOTTLE 2F7124

## (undated) DEVICE — TIES BANDING T50R

## (undated) DEVICE — PACK HEART LEFT CUSTOM

## (undated) DEVICE — BLADE CLIPPER SGL USE 9680

## (undated) DEVICE — DRAIN CHEST TUBE 36FR STR 8036

## (undated) DEVICE — DRAPE IOBAN INCISE 23X17" 6650EZ

## (undated) DEVICE — WIPES FOLEY CARE SURESTEP PROVON DFC100

## (undated) DEVICE — SUCTION DRY CHEST DRAIN OASIS 3600-100

## (undated) DEVICE — NDL COUNTER 20CT 31142493

## (undated) RX ORDER — PROPOFOL 10 MG/ML
INJECTION, EMULSION INTRAVENOUS
Status: DISPENSED
Start: 2021-03-15

## (undated) RX ORDER — EPHEDRINE SULFATE 50 MG/ML
INJECTION, SOLUTION INTRAMUSCULAR; INTRAVENOUS; SUBCUTANEOUS
Status: DISPENSED
Start: 2021-03-15

## (undated) RX ORDER — GABAPENTIN 300 MG/1
CAPSULE ORAL
Status: DISPENSED
Start: 2021-03-15

## (undated) RX ORDER — HEPARIN SODIUM 1000 [USP'U]/ML
INJECTION, SOLUTION INTRAVENOUS; SUBCUTANEOUS
Status: DISPENSED
Start: 2021-01-12

## (undated) RX ORDER — FENTANYL CITRATE-0.9 % NACL/PF 10 MCG/ML
PLASTIC BAG, INJECTION (ML) INTRAVENOUS
Status: DISPENSED
Start: 2021-03-15

## (undated) RX ORDER — HEPARIN SODIUM 1000 [USP'U]/ML
INJECTION, SOLUTION INTRAVENOUS; SUBCUTANEOUS
Status: DISPENSED
Start: 2021-03-15

## (undated) RX ORDER — FENTANYL CITRATE 50 UG/ML
INJECTION, SOLUTION INTRAMUSCULAR; INTRAVENOUS
Status: DISPENSED
Start: 2021-03-15

## (undated) RX ORDER — ONDANSETRON 2 MG/ML
INJECTION INTRAMUSCULAR; INTRAVENOUS
Status: DISPENSED
Start: 2021-01-12

## (undated) RX ORDER — DEXAMETHASONE SODIUM PHOSPHATE 4 MG/ML
INJECTION, SOLUTION INTRA-ARTICULAR; INTRALESIONAL; INTRAMUSCULAR; INTRAVENOUS; SOFT TISSUE
Status: DISPENSED
Start: 2021-03-15

## (undated) RX ORDER — ESMOLOL HYDROCHLORIDE 10 MG/ML
INJECTION INTRAVENOUS
Status: DISPENSED
Start: 2021-03-15

## (undated) RX ORDER — ASPIRIN 81 MG/1
TABLET, CHEWABLE ORAL
Status: DISPENSED
Start: 2021-03-15

## (undated) RX ORDER — LIDOCAINE HYDROCHLORIDE 20 MG/ML
INJECTION, SOLUTION EPIDURAL; INFILTRATION; INTRACAUDAL; PERINEURAL
Status: DISPENSED
Start: 2021-03-15

## (undated) RX ORDER — ONDANSETRON 2 MG/ML
INJECTION INTRAMUSCULAR; INTRAVENOUS
Status: DISPENSED
Start: 2021-03-15

## (undated) RX ORDER — NICARDIPINE HYDROCHLORIDE 2.5 MG/ML
INJECTION INTRAVENOUS
Status: DISPENSED
Start: 2021-01-12

## (undated) RX ORDER — SODIUM CHLORIDE 9 MG/ML
INJECTION, SOLUTION INTRAVENOUS
Status: DISPENSED
Start: 2021-01-12

## (undated) RX ORDER — CALCIUM CHLORIDE 100 MG/ML
INJECTION INTRAVENOUS; INTRAVENTRICULAR
Status: DISPENSED
Start: 2021-03-15

## (undated) RX ORDER — FUROSEMIDE 10 MG/ML
INJECTION INTRAMUSCULAR; INTRAVENOUS
Status: DISPENSED
Start: 2021-03-15

## (undated) RX ORDER — CEFAZOLIN SODIUM 1 G/3ML
INJECTION, POWDER, FOR SOLUTION INTRAMUSCULAR; INTRAVENOUS
Status: DISPENSED
Start: 2021-03-15

## (undated) RX ORDER — CHLORHEXIDINE GLUCONATE ORAL RINSE 1.2 MG/ML
SOLUTION DENTAL
Status: DISPENSED
Start: 2021-03-15

## (undated) RX ORDER — HYDROMORPHONE HYDROCHLORIDE 1 MG/ML
INJECTION, SOLUTION INTRAMUSCULAR; INTRAVENOUS; SUBCUTANEOUS
Status: DISPENSED
Start: 2021-03-15

## (undated) RX ORDER — ACETAMINOPHEN 325 MG/1
TABLET ORAL
Status: DISPENSED
Start: 2021-03-15

## (undated) RX ORDER — CEFAZOLIN SODIUM 2 G/100ML
INJECTION, SOLUTION INTRAVENOUS
Status: DISPENSED
Start: 2021-03-15

## (undated) RX ORDER — FAMOTIDINE 20 MG/1
TABLET, FILM COATED ORAL
Status: DISPENSED
Start: 2021-03-15